# Patient Record
Sex: MALE | Race: OTHER | HISPANIC OR LATINO | URBAN - METROPOLITAN AREA
[De-identification: names, ages, dates, MRNs, and addresses within clinical notes are randomized per-mention and may not be internally consistent; named-entity substitution may affect disease eponyms.]

---

## 2020-01-01 ENCOUNTER — INPATIENT (INPATIENT)
Facility: HOSPITAL | Age: 50
LOS: 27 days | DRG: 207 | End: 2020-05-13
Attending: FAMILY MEDICINE | Admitting: FAMILY MEDICINE
Payer: MEDICAID

## 2020-01-01 ENCOUNTER — EMERGENCY (EMERGENCY)
Facility: HOSPITAL | Age: 50
LOS: 1 days | Discharge: HOME | End: 2020-01-01
Attending: EMERGENCY MEDICINE
Payer: MEDICAID

## 2020-01-01 VITALS
HEART RATE: 106 BPM | TEMPERATURE: 99 F | DIASTOLIC BLOOD PRESSURE: 74 MMHG | SYSTOLIC BLOOD PRESSURE: 132 MMHG | RESPIRATION RATE: 22 BRPM | WEIGHT: 156.09 LBS | OXYGEN SATURATION: 86 % | HEIGHT: 62 IN

## 2020-01-01 VITALS — HEART RATE: 128 BPM | OXYGEN SATURATION: 91 %

## 2020-01-01 VITALS
DIASTOLIC BLOOD PRESSURE: 85 MMHG | TEMPERATURE: 99 F | RESPIRATION RATE: 18 BRPM | WEIGHT: 156.09 LBS | SYSTOLIC BLOOD PRESSURE: 134 MMHG | OXYGEN SATURATION: 96 % | HEART RATE: 107 BPM | HEIGHT: 62 IN

## 2020-01-01 DIAGNOSIS — U07.1 COVID-19: ICD-10-CM

## 2020-01-01 DIAGNOSIS — A41.9 SEPSIS, UNSPECIFIED ORGANISM: ICD-10-CM

## 2020-01-01 DIAGNOSIS — I50.9 HEART FAILURE, UNSPECIFIED: ICD-10-CM

## 2020-01-01 DIAGNOSIS — J18.9 PNEUMONIA, UNSPECIFIED ORGANISM: ICD-10-CM

## 2020-01-01 DIAGNOSIS — Z51.5 ENCOUNTER FOR PALLIATIVE CARE: ICD-10-CM

## 2020-01-01 DIAGNOSIS — Z29.9 ENCOUNTER FOR PROPHYLACTIC MEASURES, UNSPECIFIED: ICD-10-CM

## 2020-01-01 DIAGNOSIS — J96.01 ACUTE RESPIRATORY FAILURE WITH HYPOXIA: ICD-10-CM

## 2020-01-01 DIAGNOSIS — E43 UNSPECIFIED SEVERE PROTEIN-CALORIE MALNUTRITION: ICD-10-CM

## 2020-01-01 DIAGNOSIS — J93.9 PNEUMOTHORAX, UNSPECIFIED: ICD-10-CM

## 2020-01-01 DIAGNOSIS — B97.21 SARS-ASSOCIATED CORONAVIRUS AS THE CAUSE OF DISEASES CLASSIFIED ELSEWHERE: ICD-10-CM

## 2020-01-01 DIAGNOSIS — R53.2 FUNCTIONAL QUADRIPLEGIA: ICD-10-CM

## 2020-01-01 DIAGNOSIS — R74.0 NONSPECIFIC ELEVATION OF LEVELS OF TRANSAMINASE AND LACTIC ACID DEHYDROGENASE [LDH]: ICD-10-CM

## 2020-01-01 LAB
-  AMPICILLIN: SIGNIFICANT CHANGE UP
-  GENTAMICIN SYNERGY: SIGNIFICANT CHANGE UP
-  VANCOMYCIN: SIGNIFICANT CHANGE UP
A1C WITH ESTIMATED AVERAGE GLUCOSE RESULT: 5.6 % — SIGNIFICANT CHANGE UP (ref 4–5.6)
ABO RH CONFIRMATION: SIGNIFICANT CHANGE UP
ALBUMIN SERPL ELPH-MCNC: 1.4 G/DL — LOW (ref 3.5–5)
ALBUMIN SERPL ELPH-MCNC: 1.5 G/DL — LOW (ref 3.5–5)
ALBUMIN SERPL ELPH-MCNC: 1.6 G/DL — LOW (ref 3.5–5)
ALBUMIN SERPL ELPH-MCNC: 1.7 G/DL — LOW (ref 3.5–5)
ALBUMIN SERPL ELPH-MCNC: 1.8 G/DL — LOW (ref 3.5–5)
ALBUMIN SERPL ELPH-MCNC: 1.8 G/DL — LOW (ref 3.5–5)
ALBUMIN SERPL ELPH-MCNC: 2 G/DL — LOW (ref 3.5–5)
ALBUMIN SERPL ELPH-MCNC: 2.1 G/DL — LOW (ref 3.5–5)
ALBUMIN SERPL ELPH-MCNC: 2.2 G/DL — LOW (ref 3.5–5)
ALBUMIN SERPL ELPH-MCNC: 2.3 G/DL — LOW (ref 3.5–5)
ALBUMIN SERPL ELPH-MCNC: 2.3 G/DL — LOW (ref 3.5–5)
ALBUMIN SERPL ELPH-MCNC: 2.4 G/DL — LOW (ref 3.5–5)
ALBUMIN SERPL ELPH-MCNC: 2.4 G/DL — LOW (ref 3.5–5)
ALBUMIN SERPL ELPH-MCNC: 2.5 G/DL — LOW (ref 3.5–5)
ALBUMIN SERPL ELPH-MCNC: 2.6 G/DL — LOW (ref 3.5–5)
ALBUMIN SERPL ELPH-MCNC: 2.6 G/DL — LOW (ref 3.5–5)
ALP SERPL-CCNC: 147 U/L — HIGH (ref 40–120)
ALP SERPL-CCNC: 154 U/L — HIGH (ref 40–120)
ALP SERPL-CCNC: 157 U/L — HIGH (ref 40–120)
ALP SERPL-CCNC: 162 U/L — HIGH (ref 40–120)
ALP SERPL-CCNC: 167 U/L — HIGH (ref 40–120)
ALP SERPL-CCNC: 168 U/L — HIGH (ref 40–120)
ALP SERPL-CCNC: 171 U/L — HIGH (ref 40–120)
ALP SERPL-CCNC: 213 U/L — HIGH (ref 40–120)
ALP SERPL-CCNC: 216 U/L — HIGH (ref 40–120)
ALP SERPL-CCNC: 217 U/L — HIGH (ref 40–120)
ALP SERPL-CCNC: 219 U/L — HIGH (ref 40–120)
ALP SERPL-CCNC: 226 U/L — HIGH (ref 40–120)
ALP SERPL-CCNC: 229 U/L — HIGH (ref 40–120)
ALP SERPL-CCNC: 235 U/L — HIGH (ref 40–120)
ALP SERPL-CCNC: 236 U/L — HIGH (ref 40–120)
ALP SERPL-CCNC: 241 U/L — HIGH (ref 40–120)
ALP SERPL-CCNC: 249 U/L — HIGH (ref 40–120)
ALP SERPL-CCNC: 258 U/L — HIGH (ref 40–120)
ALP SERPL-CCNC: 259 U/L — HIGH (ref 40–120)
ALP SERPL-CCNC: 260 U/L — HIGH (ref 40–120)
ALP SERPL-CCNC: 268 U/L — HIGH (ref 40–120)
ALP SERPL-CCNC: 270 U/L — HIGH (ref 40–120)
ALP SERPL-CCNC: 272 U/L — HIGH (ref 40–120)
ALP SERPL-CCNC: 291 U/L — HIGH (ref 40–120)
ALP SERPL-CCNC: 297 U/L — HIGH (ref 40–120)
ALP SERPL-CCNC: 297 U/L — HIGH (ref 40–120)
ALP SERPL-CCNC: 340 U/L — HIGH (ref 40–120)
ALT FLD-CCNC: 106 U/L DA — HIGH (ref 10–60)
ALT FLD-CCNC: 137 U/L DA — HIGH (ref 10–60)
ALT FLD-CCNC: 145 U/L DA — HIGH (ref 10–60)
ALT FLD-CCNC: 149 U/L DA — HIGH (ref 10–60)
ALT FLD-CCNC: 15 U/L DA — SIGNIFICANT CHANGE UP (ref 10–60)
ALT FLD-CCNC: 163 U/L DA — HIGH (ref 10–60)
ALT FLD-CCNC: 164 U/L DA — HIGH (ref 10–60)
ALT FLD-CCNC: 19 U/L DA — SIGNIFICANT CHANGE UP (ref 10–60)
ALT FLD-CCNC: 19 U/L DA — SIGNIFICANT CHANGE UP (ref 10–60)
ALT FLD-CCNC: 194 U/L DA — HIGH (ref 10–60)
ALT FLD-CCNC: 197 U/L DA — HIGH (ref 10–60)
ALT FLD-CCNC: 201 U/L DA — HIGH (ref 10–60)
ALT FLD-CCNC: 203 U/L DA — HIGH (ref 10–60)
ALT FLD-CCNC: 22 U/L DA — SIGNIFICANT CHANGE UP (ref 10–60)
ALT FLD-CCNC: 24 U/L DA — SIGNIFICANT CHANGE UP (ref 10–60)
ALT FLD-CCNC: 30 U/L DA — SIGNIFICANT CHANGE UP (ref 10–60)
ALT FLD-CCNC: 33 U/L DA — SIGNIFICANT CHANGE UP (ref 10–60)
ALT FLD-CCNC: 37 U/L DA — SIGNIFICANT CHANGE UP (ref 10–60)
ALT FLD-CCNC: 41 U/L DA — SIGNIFICANT CHANGE UP (ref 10–60)
ALT FLD-CCNC: 57 U/L DA — SIGNIFICANT CHANGE UP (ref 10–60)
ALT FLD-CCNC: 57 U/L DA — SIGNIFICANT CHANGE UP (ref 10–60)
ALT FLD-CCNC: 58 U/L DA — SIGNIFICANT CHANGE UP (ref 10–60)
ALT FLD-CCNC: 66 U/L DA — HIGH (ref 10–60)
ALT FLD-CCNC: 70 U/L DA — HIGH (ref 10–60)
ALT FLD-CCNC: 72 U/L DA — HIGH (ref 10–60)
ALT FLD-CCNC: 75 U/L DA — HIGH (ref 10–60)
ALT FLD-CCNC: 76 U/L DA — HIGH (ref 10–60)
ALT FLD-CCNC: 76 U/L DA — HIGH (ref 10–60)
ALT FLD-CCNC: 95 U/L DA — HIGH (ref 10–60)
ANION GAP SERPL CALC-SCNC: 10 MMOL/L — SIGNIFICANT CHANGE UP (ref 5–17)
ANION GAP SERPL CALC-SCNC: 11 MMOL/L — SIGNIFICANT CHANGE UP (ref 5–17)
ANION GAP SERPL CALC-SCNC: 11 MMOL/L — SIGNIFICANT CHANGE UP (ref 5–17)
ANION GAP SERPL CALC-SCNC: 2 MMOL/L — LOW (ref 5–17)
ANION GAP SERPL CALC-SCNC: 3 MMOL/L — LOW (ref 5–17)
ANION GAP SERPL CALC-SCNC: 4 MMOL/L — LOW (ref 5–17)
ANION GAP SERPL CALC-SCNC: 5 MMOL/L — SIGNIFICANT CHANGE UP (ref 5–17)
ANION GAP SERPL CALC-SCNC: 5 MMOL/L — SIGNIFICANT CHANGE UP (ref 5–17)
ANION GAP SERPL CALC-SCNC: 6 MMOL/L — SIGNIFICANT CHANGE UP (ref 5–17)
ANION GAP SERPL CALC-SCNC: 7 MMOL/L — SIGNIFICANT CHANGE UP (ref 5–17)
ANION GAP SERPL CALC-SCNC: 8 MMOL/L — SIGNIFICANT CHANGE UP (ref 5–17)
ANION GAP SERPL CALC-SCNC: 9 MMOL/L — SIGNIFICANT CHANGE UP (ref 5–17)
ANISOCYTOSIS BLD QL: SLIGHT — SIGNIFICANT CHANGE UP
ANISOCYTOSIS BLD QL: SLIGHT — SIGNIFICANT CHANGE UP
APPEARANCE UR: ABNORMAL
APTT BLD: 29.6 SEC — SIGNIFICANT CHANGE UP (ref 27.5–36.3)
APTT BLD: 31.8 SEC — SIGNIFICANT CHANGE UP (ref 27.5–36.3)
APTT BLD: 32.9 SEC — SIGNIFICANT CHANGE UP (ref 27.5–36.3)
APTT BLD: 34 SEC — SIGNIFICANT CHANGE UP (ref 27.5–36.3)
APTT BLD: 37.4 SEC — HIGH (ref 27.5–36.3)
APTT BLD: 39.7 SEC — HIGH (ref 27.5–36.3)
AST SERPL-CCNC: 116 U/L — HIGH (ref 10–40)
AST SERPL-CCNC: 124 U/L — HIGH (ref 10–40)
AST SERPL-CCNC: 135 U/L — HIGH (ref 10–40)
AST SERPL-CCNC: 165 U/L — HIGH (ref 10–40)
AST SERPL-CCNC: 23 U/L — SIGNIFICANT CHANGE UP (ref 10–40)
AST SERPL-CCNC: 24 U/L — SIGNIFICANT CHANGE UP (ref 10–40)
AST SERPL-CCNC: 25 U/L — SIGNIFICANT CHANGE UP (ref 10–40)
AST SERPL-CCNC: 25 U/L — SIGNIFICANT CHANGE UP (ref 10–40)
AST SERPL-CCNC: 27 U/L — SIGNIFICANT CHANGE UP (ref 10–40)
AST SERPL-CCNC: 29 U/L — SIGNIFICANT CHANGE UP (ref 10–40)
AST SERPL-CCNC: 30 U/L — SIGNIFICANT CHANGE UP (ref 10–40)
AST SERPL-CCNC: 31 U/L — SIGNIFICANT CHANGE UP (ref 10–40)
AST SERPL-CCNC: 31 U/L — SIGNIFICANT CHANGE UP (ref 10–40)
AST SERPL-CCNC: 35 U/L — SIGNIFICANT CHANGE UP (ref 10–40)
AST SERPL-CCNC: 38 U/L — SIGNIFICANT CHANGE UP (ref 10–40)
AST SERPL-CCNC: 44 U/L — HIGH (ref 10–40)
AST SERPL-CCNC: 45 U/L — HIGH (ref 10–40)
AST SERPL-CCNC: 48 U/L — HIGH (ref 10–40)
AST SERPL-CCNC: 49 U/L — HIGH (ref 10–40)
AST SERPL-CCNC: 53 U/L — HIGH (ref 10–40)
AST SERPL-CCNC: 57 U/L — HIGH (ref 10–40)
AST SERPL-CCNC: 58 U/L — HIGH (ref 10–40)
AST SERPL-CCNC: 61 U/L — HIGH (ref 10–40)
AST SERPL-CCNC: 63 U/L — HIGH (ref 10–40)
AST SERPL-CCNC: 65 U/L — HIGH (ref 10–40)
AST SERPL-CCNC: 67 U/L — HIGH (ref 10–40)
AST SERPL-CCNC: 73 U/L — HIGH (ref 10–40)
AST SERPL-CCNC: 73 U/L — HIGH (ref 10–40)
AST SERPL-CCNC: 95 U/L — HIGH (ref 10–40)
BACTERIA # UR AUTO: ABNORMAL /HPF
BASE EXCESS BLDA CALC-SCNC: -1.5 MMOL/L — SIGNIFICANT CHANGE UP (ref -2–2)
BASE EXCESS BLDA CALC-SCNC: -1.8 MMOL/L — SIGNIFICANT CHANGE UP (ref -2–2)
BASE EXCESS BLDA CALC-SCNC: -1.8 MMOL/L — SIGNIFICANT CHANGE UP (ref -2–2)
BASE EXCESS BLDA CALC-SCNC: -3.3 MMOL/L — LOW (ref -2–2)
BASE EXCESS BLDA CALC-SCNC: 0.3 MMOL/L — SIGNIFICANT CHANGE UP (ref -2–2)
BASE EXCESS BLDA CALC-SCNC: 0.5 MMOL/L — SIGNIFICANT CHANGE UP (ref -2–2)
BASE EXCESS BLDA CALC-SCNC: 0.8 MMOL/L — SIGNIFICANT CHANGE UP (ref -2–2)
BASE EXCESS BLDA CALC-SCNC: 0.9 MMOL/L — SIGNIFICANT CHANGE UP (ref -2–2)
BASE EXCESS BLDA CALC-SCNC: 1 MMOL/L — SIGNIFICANT CHANGE UP (ref -2–2)
BASE EXCESS BLDA CALC-SCNC: 10.5 MMOL/L — HIGH (ref -2–2)
BASE EXCESS BLDA CALC-SCNC: 11.7 MMOL/L — HIGH (ref -2–2)
BASE EXCESS BLDA CALC-SCNC: 12.7 MMOL/L — HIGH (ref -2–2)
BASE EXCESS BLDA CALC-SCNC: 13.1 MMOL/L — HIGH (ref -2–2)
BASE EXCESS BLDA CALC-SCNC: 13.4 MMOL/L — HIGH (ref -2–2)
BASE EXCESS BLDA CALC-SCNC: 2 MMOL/L — SIGNIFICANT CHANGE UP (ref -2–2)
BASE EXCESS BLDA CALC-SCNC: 2.6 MMOL/L — HIGH (ref -2–2)
BASE EXCESS BLDA CALC-SCNC: 2.7 MMOL/L — HIGH (ref -2–2)
BASE EXCESS BLDA CALC-SCNC: 3 MMOL/L — HIGH (ref -2–2)
BASE EXCESS BLDA CALC-SCNC: 3.2 MMOL/L — HIGH (ref -2–2)
BASE EXCESS BLDA CALC-SCNC: 3.5 MMOL/L — HIGH (ref -2–2)
BASE EXCESS BLDA CALC-SCNC: 3.5 MMOL/L — HIGH (ref -2–2)
BASE EXCESS BLDA CALC-SCNC: 3.7 MMOL/L — HIGH (ref -2–2)
BASE EXCESS BLDA CALC-SCNC: 3.7 MMOL/L — HIGH (ref -2–2)
BASE EXCESS BLDA CALC-SCNC: 3.8 MMOL/L — HIGH (ref -2–2)
BASE EXCESS BLDA CALC-SCNC: 4.5 MMOL/L — HIGH (ref -2–2)
BASE EXCESS BLDA CALC-SCNC: 5.1 MMOL/L — HIGH (ref -2–2)
BASE EXCESS BLDA CALC-SCNC: 5.5 MMOL/L — HIGH (ref -2–2)
BASE EXCESS BLDA CALC-SCNC: 5.6 MMOL/L — HIGH (ref -2–2)
BASE EXCESS BLDA CALC-SCNC: 5.8 MMOL/L — HIGH (ref -2–2)
BASE EXCESS BLDA CALC-SCNC: 6.1 MMOL/L — HIGH (ref -2–2)
BASE EXCESS BLDA CALC-SCNC: 6.1 MMOL/L — HIGH (ref -2–2)
BASE EXCESS BLDA CALC-SCNC: 6.7 MMOL/L — SIGNIFICANT CHANGE UP (ref -2–2)
BASE EXCESS BLDA CALC-SCNC: 6.8 MMOL/L — HIGH (ref -2–2)
BASE EXCESS BLDA CALC-SCNC: 7 MMOL/L — HIGH (ref -2–2)
BASE EXCESS BLDA CALC-SCNC: 7 MMOL/L — HIGH (ref -2–2)
BASE EXCESS BLDA CALC-SCNC: 7.5 MMOL/L — HIGH (ref -2–2)
BASE EXCESS BLDA CALC-SCNC: 7.7 MMOL/L — HIGH (ref -2–2)
BASE EXCESS BLDA CALC-SCNC: 8.3 MMOL/L — HIGH (ref -2–2)
BASE EXCESS BLDA CALC-SCNC: 8.5 MMOL/L — HIGH (ref -2–2)
BASE EXCESS BLDA CALC-SCNC: 8.5 MMOL/L — HIGH (ref -2–2)
BASE EXCESS BLDA CALC-SCNC: 8.6 MMOL/L — HIGH (ref -2–2)
BASOPHILS # BLD AUTO: 0 K/UL — SIGNIFICANT CHANGE UP (ref 0–0.2)
BASOPHILS # BLD AUTO: 0.01 K/UL — SIGNIFICANT CHANGE UP (ref 0–0.2)
BASOPHILS # BLD AUTO: 0.04 K/UL — SIGNIFICANT CHANGE UP (ref 0–0.2)
BASOPHILS # BLD AUTO: 0.04 K/UL — SIGNIFICANT CHANGE UP (ref 0–0.2)
BASOPHILS # BLD AUTO: 0.05 K/UL — SIGNIFICANT CHANGE UP (ref 0–0.2)
BASOPHILS # BLD AUTO: 0.05 K/UL — SIGNIFICANT CHANGE UP (ref 0–0.2)
BASOPHILS # BLD AUTO: 0.06 K/UL — SIGNIFICANT CHANGE UP (ref 0–0.2)
BASOPHILS # BLD AUTO: 0.07 K/UL — SIGNIFICANT CHANGE UP (ref 0–0.2)
BASOPHILS # BLD AUTO: 0.07 K/UL — SIGNIFICANT CHANGE UP (ref 0–0.2)
BASOPHILS # BLD AUTO: 0.08 K/UL — SIGNIFICANT CHANGE UP (ref 0–0.2)
BASOPHILS # BLD AUTO: 0.08 K/UL — SIGNIFICANT CHANGE UP (ref 0–0.2)
BASOPHILS # BLD AUTO: 0.09 K/UL — SIGNIFICANT CHANGE UP (ref 0–0.2)
BASOPHILS # BLD AUTO: 0.1 K/UL — SIGNIFICANT CHANGE UP (ref 0–0.2)
BASOPHILS # BLD AUTO: 0.11 K/UL — SIGNIFICANT CHANGE UP (ref 0–0.2)
BASOPHILS # BLD AUTO: 0.21 K/UL — HIGH (ref 0–0.2)
BASOPHILS NFR BLD AUTO: 0 % — SIGNIFICANT CHANGE UP (ref 0–2)
BASOPHILS NFR BLD AUTO: 0.1 % — SIGNIFICANT CHANGE UP (ref 0–2)
BASOPHILS NFR BLD AUTO: 0.2 % — SIGNIFICANT CHANGE UP (ref 0–2)
BASOPHILS NFR BLD AUTO: 0.3 % — SIGNIFICANT CHANGE UP (ref 0–2)
BASOPHILS NFR BLD AUTO: 0.4 % — SIGNIFICANT CHANGE UP (ref 0–2)
BASOPHILS NFR BLD AUTO: 0.5 % — SIGNIFICANT CHANGE UP (ref 0–2)
BASOPHILS NFR BLD AUTO: 1 % — SIGNIFICANT CHANGE UP (ref 0–2)
BILIRUB DIRECT SERPL-MCNC: 0.5 MG/DL — HIGH (ref 0–0.2)
BILIRUB INDIRECT FLD-MCNC: 0.6 MG/DL — SIGNIFICANT CHANGE UP (ref 0.2–1)
BILIRUB SERPL-MCNC: 0.6 MG/DL — SIGNIFICANT CHANGE UP (ref 0.2–1.2)
BILIRUB SERPL-MCNC: 0.7 MG/DL — SIGNIFICANT CHANGE UP (ref 0.2–1.2)
BILIRUB SERPL-MCNC: 0.7 MG/DL — SIGNIFICANT CHANGE UP (ref 0.2–1.2)
BILIRUB SERPL-MCNC: 0.8 MG/DL — SIGNIFICANT CHANGE UP (ref 0.2–1.2)
BILIRUB SERPL-MCNC: 0.9 MG/DL — SIGNIFICANT CHANGE UP (ref 0.2–1.2)
BILIRUB SERPL-MCNC: 0.9 MG/DL — SIGNIFICANT CHANGE UP (ref 0.2–1.2)
BILIRUB SERPL-MCNC: 1 MG/DL — SIGNIFICANT CHANGE UP (ref 0.2–1.2)
BILIRUB SERPL-MCNC: 1.1 MG/DL — SIGNIFICANT CHANGE UP (ref 0.2–1.2)
BILIRUB SERPL-MCNC: 1.2 MG/DL — SIGNIFICANT CHANGE UP (ref 0.2–1.2)
BILIRUB SERPL-MCNC: 1.3 MG/DL — HIGH (ref 0.2–1.2)
BILIRUB SERPL-MCNC: 1.4 MG/DL — HIGH (ref 0.2–1.2)
BILIRUB SERPL-MCNC: 1.5 MG/DL — HIGH (ref 0.2–1.2)
BILIRUB SERPL-MCNC: 1.5 MG/DL — HIGH (ref 0.2–1.2)
BILIRUB SERPL-MCNC: 2.2 MG/DL — HIGH (ref 0.2–1.2)
BILIRUB UR-MCNC: NEGATIVE — SIGNIFICANT CHANGE UP
BLD GP AB SCN SERPL QL: SIGNIFICANT CHANGE UP
BLOOD GAS COMMENTS ARTERIAL: SIGNIFICANT CHANGE UP
BUN SERPL-MCNC: 10 MG/DL — SIGNIFICANT CHANGE UP (ref 7–18)
BUN SERPL-MCNC: 11 MG/DL — SIGNIFICANT CHANGE UP (ref 7–18)
BUN SERPL-MCNC: 12 MG/DL — SIGNIFICANT CHANGE UP (ref 7–18)
BUN SERPL-MCNC: 12 MG/DL — SIGNIFICANT CHANGE UP (ref 7–18)
BUN SERPL-MCNC: 13 MG/DL — SIGNIFICANT CHANGE UP (ref 7–18)
BUN SERPL-MCNC: 14 MG/DL — SIGNIFICANT CHANGE UP (ref 7–18)
BUN SERPL-MCNC: 15 MG/DL — SIGNIFICANT CHANGE UP (ref 7–18)
BUN SERPL-MCNC: 16 MG/DL — SIGNIFICANT CHANGE UP (ref 7–18)
BUN SERPL-MCNC: 17 MG/DL — SIGNIFICANT CHANGE UP (ref 7–18)
BUN SERPL-MCNC: 17 MG/DL — SIGNIFICANT CHANGE UP (ref 7–18)
BUN SERPL-MCNC: 19 MG/DL — HIGH (ref 7–18)
BUN SERPL-MCNC: 20 MG/DL — HIGH (ref 7–18)
BUN SERPL-MCNC: 21 MG/DL — HIGH (ref 7–18)
BUN SERPL-MCNC: 23 MG/DL — HIGH (ref 7–18)
BUN SERPL-MCNC: 23 MG/DL — HIGH (ref 7–18)
BUN SERPL-MCNC: 24 MG/DL — HIGH (ref 7–18)
BUN SERPL-MCNC: 26 MG/DL — HIGH (ref 7–18)
BUN SERPL-MCNC: 28 MG/DL — HIGH (ref 7–18)
BUN SERPL-MCNC: 33 MG/DL — HIGH (ref 7–18)
BUN SERPL-MCNC: 7 MG/DL — SIGNIFICANT CHANGE UP (ref 7–18)
BUN SERPL-MCNC: 9 MG/DL — SIGNIFICANT CHANGE UP (ref 7–18)
BUN SERPL-MCNC: 9 MG/DL — SIGNIFICANT CHANGE UP (ref 7–18)
CALCIUM SERPL-MCNC: 7 MG/DL — LOW (ref 8.4–10.5)
CALCIUM SERPL-MCNC: 7.1 MG/DL — LOW (ref 8.4–10.5)
CALCIUM SERPL-MCNC: 7.3 MG/DL — LOW (ref 8.4–10.5)
CALCIUM SERPL-MCNC: 7.4 MG/DL — LOW (ref 8.4–10.5)
CALCIUM SERPL-MCNC: 7.5 MG/DL — LOW (ref 8.4–10.5)
CALCIUM SERPL-MCNC: 7.5 MG/DL — LOW (ref 8.4–10.5)
CALCIUM SERPL-MCNC: 7.6 MG/DL — LOW (ref 8.4–10.5)
CALCIUM SERPL-MCNC: 7.6 MG/DL — LOW (ref 8.4–10.5)
CALCIUM SERPL-MCNC: 7.7 MG/DL — LOW (ref 8.4–10.5)
CALCIUM SERPL-MCNC: 7.8 MG/DL — LOW (ref 8.4–10.5)
CALCIUM SERPL-MCNC: 7.8 MG/DL — LOW (ref 8.4–10.5)
CALCIUM SERPL-MCNC: 7.9 MG/DL — LOW (ref 8.4–10.5)
CALCIUM SERPL-MCNC: 8.1 MG/DL — LOW (ref 8.4–10.5)
CALCIUM SERPL-MCNC: 8.2 MG/DL — LOW (ref 8.4–10.5)
CALCIUM SERPL-MCNC: 8.3 MG/DL — LOW (ref 8.4–10.5)
CALCIUM SERPL-MCNC: 8.5 MG/DL — SIGNIFICANT CHANGE UP (ref 8.4–10.5)
CALCIUM SERPL-MCNC: 8.5 MG/DL — SIGNIFICANT CHANGE UP (ref 8.4–10.5)
CALCIUM SERPL-MCNC: 8.6 MG/DL — SIGNIFICANT CHANGE UP (ref 8.4–10.5)
CALCIUM SERPL-MCNC: 9 MG/DL — SIGNIFICANT CHANGE UP (ref 8.4–10.5)
CHLORIDE SERPL-SCNC: 100 MMOL/L — SIGNIFICANT CHANGE UP (ref 96–108)
CHLORIDE SERPL-SCNC: 101 MMOL/L — SIGNIFICANT CHANGE UP (ref 96–108)
CHLORIDE SERPL-SCNC: 101 MMOL/L — SIGNIFICANT CHANGE UP (ref 96–108)
CHLORIDE SERPL-SCNC: 102 MMOL/L — SIGNIFICANT CHANGE UP (ref 96–108)
CHLORIDE SERPL-SCNC: 103 MMOL/L — SIGNIFICANT CHANGE UP (ref 96–108)
CHLORIDE SERPL-SCNC: 104 MMOL/L — SIGNIFICANT CHANGE UP (ref 96–108)
CHLORIDE SERPL-SCNC: 105 MMOL/L — SIGNIFICANT CHANGE UP (ref 96–108)
CHLORIDE SERPL-SCNC: 107 MMOL/L — SIGNIFICANT CHANGE UP (ref 96–108)
CHLORIDE SERPL-SCNC: 107 MMOL/L — SIGNIFICANT CHANGE UP (ref 96–108)
CHLORIDE SERPL-SCNC: 108 MMOL/L — SIGNIFICANT CHANGE UP (ref 96–108)
CHLORIDE SERPL-SCNC: 109 MMOL/L — HIGH (ref 96–108)
CHLORIDE SERPL-SCNC: 111 MMOL/L — HIGH (ref 96–108)
CHLORIDE SERPL-SCNC: 112 MMOL/L — HIGH (ref 96–108)
CHLORIDE SERPL-SCNC: 114 MMOL/L — HIGH (ref 96–108)
CHLORIDE SERPL-SCNC: 114 MMOL/L — HIGH (ref 96–108)
CHLORIDE SERPL-SCNC: 115 MMOL/L — HIGH (ref 96–108)
CHLORIDE SERPL-SCNC: 96 MMOL/L — SIGNIFICANT CHANGE UP (ref 96–108)
CHLORIDE SERPL-SCNC: 96 MMOL/L — SIGNIFICANT CHANGE UP (ref 96–108)
CHLORIDE SERPL-SCNC: 97 MMOL/L — SIGNIFICANT CHANGE UP (ref 96–108)
CHLORIDE SERPL-SCNC: 98 MMOL/L — SIGNIFICANT CHANGE UP (ref 96–108)
CHLORIDE SERPL-SCNC: 99 MMOL/L — SIGNIFICANT CHANGE UP (ref 96–108)
CHOLEST SERPL-MCNC: 128 MG/DL — SIGNIFICANT CHANGE UP (ref 10–199)
CK MB BLD-MCNC: 0.8 % — SIGNIFICANT CHANGE UP (ref 0–3.5)
CK MB CFR SERPL CALC: 2.7 NG/ML — SIGNIFICANT CHANGE UP (ref 0–3.6)
CK SERPL-CCNC: 330 U/L — HIGH (ref 35–232)
CO2 SERPL-SCNC: 25 MMOL/L — SIGNIFICANT CHANGE UP (ref 22–31)
CO2 SERPL-SCNC: 26 MMOL/L — SIGNIFICANT CHANGE UP (ref 22–31)
CO2 SERPL-SCNC: 27 MMOL/L — SIGNIFICANT CHANGE UP (ref 22–31)
CO2 SERPL-SCNC: 29 MMOL/L — SIGNIFICANT CHANGE UP (ref 22–31)
CO2 SERPL-SCNC: 30 MMOL/L — SIGNIFICANT CHANGE UP (ref 22–31)
CO2 SERPL-SCNC: 31 MMOL/L — SIGNIFICANT CHANGE UP (ref 22–31)
CO2 SERPL-SCNC: 32 MMOL/L — HIGH (ref 22–31)
CO2 SERPL-SCNC: 33 MMOL/L — HIGH (ref 22–31)
CO2 SERPL-SCNC: 34 MMOL/L — HIGH (ref 22–31)
CO2 SERPL-SCNC: 35 MMOL/L — HIGH (ref 22–31)
CO2 SERPL-SCNC: 36 MMOL/L — HIGH (ref 22–31)
CO2 SERPL-SCNC: 38 MMOL/L — HIGH (ref 22–31)
CO2 SERPL-SCNC: 39 MMOL/L — HIGH (ref 22–31)
CO2 SERPL-SCNC: 39 MMOL/L — HIGH (ref 22–31)
CO2 SERPL-SCNC: 41 MMOL/L — HIGH (ref 22–31)
COLOR SPEC: YELLOW — SIGNIFICANT CHANGE UP
CREAT SERPL-MCNC: 0.3 MG/DL — LOW (ref 0.5–1.3)
CREAT SERPL-MCNC: 0.38 MG/DL — LOW (ref 0.5–1.3)
CREAT SERPL-MCNC: 0.38 MG/DL — LOW (ref 0.5–1.3)
CREAT SERPL-MCNC: 0.4 MG/DL — LOW (ref 0.5–1.3)
CREAT SERPL-MCNC: 0.46 MG/DL — LOW (ref 0.5–1.3)
CREAT SERPL-MCNC: 0.48 MG/DL — LOW (ref 0.5–1.3)
CREAT SERPL-MCNC: 0.52 MG/DL — SIGNIFICANT CHANGE UP (ref 0.5–1.3)
CREAT SERPL-MCNC: 0.54 MG/DL — SIGNIFICANT CHANGE UP (ref 0.5–1.3)
CREAT SERPL-MCNC: 0.55 MG/DL — SIGNIFICANT CHANGE UP (ref 0.5–1.3)
CREAT SERPL-MCNC: 0.56 MG/DL — SIGNIFICANT CHANGE UP (ref 0.5–1.3)
CREAT SERPL-MCNC: 0.57 MG/DL — SIGNIFICANT CHANGE UP (ref 0.5–1.3)
CREAT SERPL-MCNC: 0.58 MG/DL — SIGNIFICANT CHANGE UP (ref 0.5–1.3)
CREAT SERPL-MCNC: 0.59 MG/DL — SIGNIFICANT CHANGE UP (ref 0.5–1.3)
CREAT SERPL-MCNC: 0.6 MG/DL — SIGNIFICANT CHANGE UP (ref 0.5–1.3)
CREAT SERPL-MCNC: 0.61 MG/DL — SIGNIFICANT CHANGE UP (ref 0.5–1.3)
CREAT SERPL-MCNC: 0.63 MG/DL — SIGNIFICANT CHANGE UP (ref 0.5–1.3)
CREAT SERPL-MCNC: 0.63 MG/DL — SIGNIFICANT CHANGE UP (ref 0.5–1.3)
CREAT SERPL-MCNC: 0.64 MG/DL — SIGNIFICANT CHANGE UP (ref 0.5–1.3)
CREAT SERPL-MCNC: 0.65 MG/DL — SIGNIFICANT CHANGE UP (ref 0.5–1.3)
CREAT SERPL-MCNC: 0.66 MG/DL — SIGNIFICANT CHANGE UP (ref 0.5–1.3)
CREAT SERPL-MCNC: 0.67 MG/DL — SIGNIFICANT CHANGE UP (ref 0.5–1.3)
CREAT SERPL-MCNC: 0.68 MG/DL — SIGNIFICANT CHANGE UP (ref 0.5–1.3)
CREAT SERPL-MCNC: 0.68 MG/DL — SIGNIFICANT CHANGE UP (ref 0.5–1.3)
CREAT SERPL-MCNC: 0.69 MG/DL — SIGNIFICANT CHANGE UP (ref 0.5–1.3)
CREAT SERPL-MCNC: 0.72 MG/DL — SIGNIFICANT CHANGE UP (ref 0.5–1.3)
CREAT SERPL-MCNC: 0.72 MG/DL — SIGNIFICANT CHANGE UP (ref 0.5–1.3)
CREAT SERPL-MCNC: 0.74 MG/DL — SIGNIFICANT CHANGE UP (ref 0.5–1.3)
CREAT SERPL-MCNC: 0.83 MG/DL — SIGNIFICANT CHANGE UP (ref 0.5–1.3)
CREAT SERPL-MCNC: 0.89 MG/DL — SIGNIFICANT CHANGE UP (ref 0.5–1.3)
CREAT SERPL-MCNC: 0.96 MG/DL — SIGNIFICANT CHANGE UP (ref 0.5–1.3)
CREAT SERPL-MCNC: 1.03 MG/DL — SIGNIFICANT CHANGE UP (ref 0.5–1.3)
CREAT SERPL-MCNC: 1.04 MG/DL — SIGNIFICANT CHANGE UP (ref 0.5–1.3)
CRP SERPL-MCNC: 0.51 MG/DL — HIGH (ref 0–0.4)
CRP SERPL-MCNC: 0.84 MG/DL — HIGH (ref 0–0.4)
CRP SERPL-MCNC: 0.91 MG/DL — HIGH (ref 0–0.4)
CRP SERPL-MCNC: 1.19 MG/DL — HIGH (ref 0–0.4)
CRP SERPL-MCNC: 1.26 MG/DL — HIGH (ref 0–0.4)
CRP SERPL-MCNC: 1.52 MG/DL — HIGH (ref 0–0.4)
CRP SERPL-MCNC: 10.88 MG/DL — HIGH (ref 0–0.4)
CRP SERPL-MCNC: 11.27 MG/DL — HIGH (ref 0–0.4)
CRP SERPL-MCNC: 11.35 MG/DL — HIGH (ref 0–0.4)
CRP SERPL-MCNC: 15.1 MG/DL — HIGH (ref 0–0.4)
CRP SERPL-MCNC: 15.65 MG/DL — HIGH (ref 0–0.4)
CRP SERPL-MCNC: 15.71 MG/DL — HIGH (ref 0–0.4)
CRP SERPL-MCNC: 16.74 MG/DL — HIGH (ref 0–0.4)
CRP SERPL-MCNC: 18.18 MG/DL — HIGH (ref 0–0.4)
CRP SERPL-MCNC: 27.98 MG/DL — HIGH (ref 0–0.4)
CRP SERPL-MCNC: 3.2 MG/DL — HIGH (ref 0–0.4)
CRP SERPL-MCNC: 3.47 MG/DL — HIGH (ref 0–0.4)
CRP SERPL-MCNC: 3.6 MG/DL — HIGH (ref 0–0.4)
CRP SERPL-MCNC: 5.49 MG/DL — HIGH (ref 0–0.4)
CRP SERPL-MCNC: 6.08 MG/DL — HIGH (ref 0–0.4)
CRP SERPL-MCNC: 8.16 MG/DL — HIGH (ref 0–0.4)
CULTURE RESULTS: SIGNIFICANT CHANGE UP
D DIMER BLD IA.RAPID-MCNC: 1144 NG/ML DDU — HIGH
D DIMER BLD IA.RAPID-MCNC: 1173 NG/ML DDU — HIGH
D DIMER BLD IA.RAPID-MCNC: 1302 NG/ML DDU — HIGH
D DIMER BLD IA.RAPID-MCNC: 1791 NG/ML DDU — HIGH
D DIMER BLD IA.RAPID-MCNC: 1797 NG/ML DDU — HIGH
D DIMER BLD IA.RAPID-MCNC: 217 NG/ML DDU — SIGNIFICANT CHANGE UP
D DIMER BLD IA.RAPID-MCNC: 247 NG/ML DDU — HIGH
D DIMER BLD IA.RAPID-MCNC: 685 NG/ML DDU — HIGH
DIFF PNL FLD: ABNORMAL
ENTEROCOC DNA BLD POS QL NAA+NON-PROBE: SIGNIFICANT CHANGE UP
EOSINOPHIL # BLD AUTO: 0 K/UL — SIGNIFICANT CHANGE UP (ref 0–0.5)
EOSINOPHIL # BLD AUTO: 0.01 K/UL — SIGNIFICANT CHANGE UP (ref 0–0.5)
EOSINOPHIL # BLD AUTO: 0.04 K/UL — SIGNIFICANT CHANGE UP (ref 0–0.5)
EOSINOPHIL # BLD AUTO: 0.08 K/UL — SIGNIFICANT CHANGE UP (ref 0–0.5)
EOSINOPHIL # BLD AUTO: 0.15 K/UL — SIGNIFICANT CHANGE UP (ref 0–0.5)
EOSINOPHIL # BLD AUTO: 0.18 K/UL — SIGNIFICANT CHANGE UP (ref 0–0.5)
EOSINOPHIL # BLD AUTO: 0.19 K/UL — SIGNIFICANT CHANGE UP (ref 0–0.5)
EOSINOPHIL # BLD AUTO: 0.21 K/UL — SIGNIFICANT CHANGE UP (ref 0–0.5)
EOSINOPHIL # BLD AUTO: 0.23 K/UL — SIGNIFICANT CHANGE UP (ref 0–0.5)
EOSINOPHIL # BLD AUTO: 0.23 K/UL — SIGNIFICANT CHANGE UP (ref 0–0.5)
EOSINOPHIL # BLD AUTO: 0.24 K/UL — SIGNIFICANT CHANGE UP (ref 0–0.5)
EOSINOPHIL # BLD AUTO: 0.24 K/UL — SIGNIFICANT CHANGE UP (ref 0–0.5)
EOSINOPHIL # BLD AUTO: 0.3 K/UL — SIGNIFICANT CHANGE UP (ref 0–0.5)
EOSINOPHIL # BLD AUTO: 0.31 K/UL — SIGNIFICANT CHANGE UP (ref 0–0.5)
EOSINOPHIL # BLD AUTO: 0.32 K/UL — SIGNIFICANT CHANGE UP (ref 0–0.5)
EOSINOPHIL # BLD AUTO: 0.86 K/UL — HIGH (ref 0–0.5)
EOSINOPHIL # BLD AUTO: 1.33 K/UL — HIGH (ref 0–0.5)
EOSINOPHIL # BLD AUTO: 1.39 K/UL — HIGH (ref 0–0.5)
EOSINOPHIL # BLD AUTO: 1.76 K/UL — HIGH (ref 0–0.5)
EOSINOPHIL # BLD AUTO: 2.31 K/UL — HIGH (ref 0–0.5)
EOSINOPHIL NFR BLD AUTO: 0 % — SIGNIFICANT CHANGE UP (ref 0–6)
EOSINOPHIL NFR BLD AUTO: 0.1 % — SIGNIFICANT CHANGE UP (ref 0–6)
EOSINOPHIL NFR BLD AUTO: 0.3 % — SIGNIFICANT CHANGE UP (ref 0–6)
EOSINOPHIL NFR BLD AUTO: 0.6 % — SIGNIFICANT CHANGE UP (ref 0–6)
EOSINOPHIL NFR BLD AUTO: 0.6 % — SIGNIFICANT CHANGE UP (ref 0–6)
EOSINOPHIL NFR BLD AUTO: 0.7 % — SIGNIFICANT CHANGE UP (ref 0–6)
EOSINOPHIL NFR BLD AUTO: 1 % — SIGNIFICANT CHANGE UP (ref 0–6)
EOSINOPHIL NFR BLD AUTO: 1.1 % — SIGNIFICANT CHANGE UP (ref 0–6)
EOSINOPHIL NFR BLD AUTO: 1.1 % — SIGNIFICANT CHANGE UP (ref 0–6)
EOSINOPHIL NFR BLD AUTO: 1.3 % — SIGNIFICANT CHANGE UP (ref 0–6)
EOSINOPHIL NFR BLD AUTO: 1.3 % — SIGNIFICANT CHANGE UP (ref 0–6)
EOSINOPHIL NFR BLD AUTO: 1.4 % — SIGNIFICANT CHANGE UP (ref 0–6)
EOSINOPHIL NFR BLD AUTO: 1.4 % — SIGNIFICANT CHANGE UP (ref 0–6)
EOSINOPHIL NFR BLD AUTO: 1.5 % — SIGNIFICANT CHANGE UP (ref 0–6)
EOSINOPHIL NFR BLD AUTO: 11 % — HIGH (ref 0–6)
EOSINOPHIL NFR BLD AUTO: 4.2 % — SIGNIFICANT CHANGE UP (ref 0–6)
EOSINOPHIL NFR BLD AUTO: 6.9 % — HIGH (ref 0–6)
EOSINOPHIL NFR BLD AUTO: 7.7 % — HIGH (ref 0–6)
EOSINOPHIL NFR BLD AUTO: 9.3 % — HIGH (ref 0–6)
EPI CELLS # UR: SIGNIFICANT CHANGE UP /HPF
ESTIMATED AVERAGE GLUCOSE: 114 MG/DL — SIGNIFICANT CHANGE UP (ref 68–114)
FERRITIN SERPL-MCNC: 1157 NG/ML — HIGH (ref 30–400)
FERRITIN SERPL-MCNC: 1170 NG/ML — HIGH (ref 30–400)
FERRITIN SERPL-MCNC: 1216 NG/ML — HIGH (ref 30–400)
FERRITIN SERPL-MCNC: 1355 NG/ML — HIGH (ref 30–400)
FERRITIN SERPL-MCNC: 1456 NG/ML — HIGH (ref 30–400)
FERRITIN SERPL-MCNC: 1665 NG/ML — HIGH (ref 30–400)
FERRITIN SERPL-MCNC: 1764 NG/ML — HIGH (ref 30–400)
FERRITIN SERPL-MCNC: 1999 NG/ML — HIGH (ref 30–400)
FERRITIN SERPL-MCNC: 2213 NG/ML — HIGH (ref 30–400)
FERRITIN SERPL-MCNC: 3618 NG/ML — HIGH (ref 30–400)
FERRITIN SERPL-MCNC: 6736 NG/ML — HIGH (ref 30–400)
FIBRINOGEN PPP-MCNC: 970 MG/DL — HIGH (ref 350–510)
GLUCOSE BLDC GLUCOMTR-MCNC: 105 MG/DL — HIGH (ref 70–99)
GLUCOSE BLDC GLUCOMTR-MCNC: 90 MG/DL — SIGNIFICANT CHANGE UP (ref 70–99)
GLUCOSE SERPL-MCNC: 102 MG/DL — HIGH (ref 70–99)
GLUCOSE SERPL-MCNC: 102 MG/DL — HIGH (ref 70–99)
GLUCOSE SERPL-MCNC: 104 MG/DL — HIGH (ref 70–99)
GLUCOSE SERPL-MCNC: 108 MG/DL — HIGH (ref 70–99)
GLUCOSE SERPL-MCNC: 110 MG/DL — HIGH (ref 70–99)
GLUCOSE SERPL-MCNC: 112 MG/DL — HIGH (ref 70–99)
GLUCOSE SERPL-MCNC: 117 MG/DL — HIGH (ref 70–99)
GLUCOSE SERPL-MCNC: 117 MG/DL — HIGH (ref 70–99)
GLUCOSE SERPL-MCNC: 118 MG/DL — HIGH (ref 70–99)
GLUCOSE SERPL-MCNC: 121 MG/DL — HIGH (ref 70–99)
GLUCOSE SERPL-MCNC: 122 MG/DL — HIGH (ref 70–99)
GLUCOSE SERPL-MCNC: 124 MG/DL — HIGH (ref 70–99)
GLUCOSE SERPL-MCNC: 128 MG/DL — HIGH (ref 70–99)
GLUCOSE SERPL-MCNC: 130 MG/DL — HIGH (ref 70–99)
GLUCOSE SERPL-MCNC: 134 MG/DL — HIGH (ref 70–99)
GLUCOSE SERPL-MCNC: 140 MG/DL — HIGH (ref 70–99)
GLUCOSE SERPL-MCNC: 150 MG/DL — HIGH (ref 70–99)
GLUCOSE SERPL-MCNC: 155 MG/DL — HIGH (ref 70–99)
GLUCOSE SERPL-MCNC: 179 MG/DL — HIGH (ref 70–99)
GLUCOSE SERPL-MCNC: 179 MG/DL — HIGH (ref 70–99)
GLUCOSE SERPL-MCNC: 199 MG/DL — HIGH (ref 70–99)
GLUCOSE SERPL-MCNC: 73 MG/DL — SIGNIFICANT CHANGE UP (ref 70–99)
GLUCOSE SERPL-MCNC: 76 MG/DL — SIGNIFICANT CHANGE UP (ref 70–99)
GLUCOSE SERPL-MCNC: 80 MG/DL — SIGNIFICANT CHANGE UP (ref 70–99)
GLUCOSE SERPL-MCNC: 81 MG/DL — SIGNIFICANT CHANGE UP (ref 70–99)
GLUCOSE SERPL-MCNC: 86 MG/DL — SIGNIFICANT CHANGE UP (ref 70–99)
GLUCOSE SERPL-MCNC: 87 MG/DL — SIGNIFICANT CHANGE UP (ref 70–99)
GLUCOSE SERPL-MCNC: 87 MG/DL — SIGNIFICANT CHANGE UP (ref 70–99)
GLUCOSE SERPL-MCNC: 90 MG/DL — SIGNIFICANT CHANGE UP (ref 70–99)
GLUCOSE SERPL-MCNC: 95 MG/DL — SIGNIFICANT CHANGE UP (ref 70–99)
GLUCOSE SERPL-MCNC: 95 MG/DL — SIGNIFICANT CHANGE UP (ref 70–99)
GLUCOSE SERPL-MCNC: 96 MG/DL — SIGNIFICANT CHANGE UP (ref 70–99)
GLUCOSE SERPL-MCNC: 97 MG/DL — SIGNIFICANT CHANGE UP (ref 70–99)
GLUCOSE SERPL-MCNC: 98 MG/DL — SIGNIFICANT CHANGE UP (ref 70–99)
GLUCOSE UR QL: NEGATIVE — SIGNIFICANT CHANGE UP
GRAM STN FLD: SIGNIFICANT CHANGE UP
GRAN CASTS # UR COMP ASSIST: ABNORMAL /LPF
HCO3 BLDA-SCNC: 23 MMOL/L — SIGNIFICANT CHANGE UP (ref 23–27)
HCO3 BLDA-SCNC: 24 MMOL/L — SIGNIFICANT CHANGE UP (ref 23–27)
HCO3 BLDA-SCNC: 25 MMOL/L — SIGNIFICANT CHANGE UP (ref 23–27)
HCO3 BLDA-SCNC: 26 MMOL/L — SIGNIFICANT CHANGE UP (ref 23–27)
HCO3 BLDA-SCNC: 26 MMOL/L — SIGNIFICANT CHANGE UP (ref 23–27)
HCO3 BLDA-SCNC: 27 MMOL/L — SIGNIFICANT CHANGE UP (ref 23–27)
HCO3 BLDA-SCNC: 28 MMOL/L — HIGH (ref 23–27)
HCO3 BLDA-SCNC: 28 MMOL/L — HIGH (ref 23–27)
HCO3 BLDA-SCNC: 29 MMOL/L — HIGH (ref 23–27)
HCO3 BLDA-SCNC: 30 MMOL/L — HIGH (ref 23–27)
HCO3 BLDA-SCNC: 31 MMOL/L — HIGH (ref 23–27)
HCO3 BLDA-SCNC: 32 MMOL/L — HIGH (ref 23–27)
HCO3 BLDA-SCNC: 33 MMOL/L — HIGH (ref 23–27)
HCO3 BLDA-SCNC: 34 MMOL/L — HIGH (ref 23–27)
HCO3 BLDA-SCNC: 35 MMOL/L — HIGH (ref 23–27)
HCO3 BLDA-SCNC: 35 MMOL/L — HIGH (ref 23–27)
HCO3 BLDA-SCNC: 37 MMOL/L — HIGH (ref 23–27)
HCO3 BLDA-SCNC: 39 MMOL/L — HIGH (ref 23–27)
HCO3 BLDA-SCNC: 40 MMOL/L — HIGH (ref 23–27)
HCT VFR BLD CALC: 22.2 % — LOW (ref 39–50)
HCT VFR BLD CALC: 23.7 % — LOW (ref 39–50)
HCT VFR BLD CALC: 25.1 % — LOW (ref 39–50)
HCT VFR BLD CALC: 25.6 % — LOW (ref 39–50)
HCT VFR BLD CALC: 25.9 % — LOW (ref 39–50)
HCT VFR BLD CALC: 26.1 % — LOW (ref 39–50)
HCT VFR BLD CALC: 26.4 % — LOW (ref 39–50)
HCT VFR BLD CALC: 26.5 % — LOW (ref 39–50)
HCT VFR BLD CALC: 26.8 % — LOW (ref 39–50)
HCT VFR BLD CALC: 27.1 % — LOW (ref 39–50)
HCT VFR BLD CALC: 27.1 % — LOW (ref 39–50)
HCT VFR BLD CALC: 27.6 % — LOW (ref 39–50)
HCT VFR BLD CALC: 27.7 % — LOW (ref 39–50)
HCT VFR BLD CALC: 27.8 % — LOW (ref 39–50)
HCT VFR BLD CALC: 32.9 % — LOW (ref 39–50)
HCT VFR BLD CALC: 34.3 % — LOW (ref 39–50)
HCT VFR BLD CALC: 34.6 % — LOW (ref 39–50)
HCT VFR BLD CALC: 34.9 % — LOW (ref 39–50)
HCT VFR BLD CALC: 36.7 % — LOW (ref 39–50)
HCT VFR BLD CALC: 37.3 % — LOW (ref 39–50)
HCT VFR BLD CALC: 37.5 % — LOW (ref 39–50)
HCT VFR BLD CALC: 38.4 % — LOW (ref 39–50)
HCT VFR BLD CALC: 38.5 % — LOW (ref 39–50)
HCT VFR BLD CALC: 38.7 % — LOW (ref 39–50)
HCT VFR BLD CALC: 38.7 % — LOW (ref 39–50)
HCT VFR BLD CALC: 39.1 % — SIGNIFICANT CHANGE UP (ref 39–50)
HCT VFR BLD CALC: 44.2 % — SIGNIFICANT CHANGE UP (ref 39–50)
HCT VFR BLD CALC: 44.9 % — SIGNIFICANT CHANGE UP (ref 39–50)
HCT VFR BLD CALC: 48.8 % — SIGNIFICANT CHANGE UP (ref 39–50)
HCT VFR BLD CALC: 49.6 % — SIGNIFICANT CHANGE UP (ref 39–50)
HDLC SERPL-MCNC: 24 MG/DL — LOW
HGB BLD-MCNC: 10.1 G/DL — LOW (ref 13–17)
HGB BLD-MCNC: 10.7 G/DL — LOW (ref 13–17)
HGB BLD-MCNC: 10.7 G/DL — LOW (ref 13–17)
HGB BLD-MCNC: 11.1 G/DL — LOW (ref 13–17)
HGB BLD-MCNC: 11.3 G/DL — LOW (ref 13–17)
HGB BLD-MCNC: 11.7 G/DL — LOW (ref 13–17)
HGB BLD-MCNC: 12.1 G/DL — LOW (ref 13–17)
HGB BLD-MCNC: 12.6 G/DL — LOW (ref 13–17)
HGB BLD-MCNC: 12.9 G/DL — LOW (ref 13–17)
HGB BLD-MCNC: 12.9 G/DL — LOW (ref 13–17)
HGB BLD-MCNC: 13.5 G/DL — SIGNIFICANT CHANGE UP (ref 13–17)
HGB BLD-MCNC: 14.4 G/DL — SIGNIFICANT CHANGE UP (ref 13–17)
HGB BLD-MCNC: 15.3 G/DL — SIGNIFICANT CHANGE UP (ref 13–17)
HGB BLD-MCNC: 16 G/DL — SIGNIFICANT CHANGE UP (ref 13–17)
HGB BLD-MCNC: 7 G/DL — CRITICAL LOW (ref 13–17)
HGB BLD-MCNC: 7.6 G/DL — LOW (ref 13–17)
HGB BLD-MCNC: 7.9 G/DL — LOW (ref 13–17)
HGB BLD-MCNC: 7.9 G/DL — LOW (ref 13–17)
HGB BLD-MCNC: 8 G/DL — LOW (ref 13–17)
HGB BLD-MCNC: 8.1 G/DL — LOW (ref 13–17)
HGB BLD-MCNC: 8.2 G/DL — LOW (ref 13–17)
HGB BLD-MCNC: 8.3 G/DL — LOW (ref 13–17)
HGB BLD-MCNC: 8.4 G/DL — LOW (ref 13–17)
HGB BLD-MCNC: 8.4 G/DL — LOW (ref 13–17)
HGB BLD-MCNC: 8.5 G/DL — LOW (ref 13–17)
HGB BLD-MCNC: 8.6 G/DL — LOW (ref 13–17)
HOROWITZ INDEX BLDA+IHG-RTO: 100 — SIGNIFICANT CHANGE UP
HOROWITZ INDEX BLDA+IHG-RTO: 60 — SIGNIFICANT CHANGE UP
HOROWITZ INDEX BLDA+IHG-RTO: 70 — SIGNIFICANT CHANGE UP
HOROWITZ INDEX BLDA+IHG-RTO: 75 — SIGNIFICANT CHANGE UP
HOROWITZ INDEX BLDA+IHG-RTO: 80 — SIGNIFICANT CHANGE UP
HOROWITZ INDEX BLDA+IHG-RTO: 85 — SIGNIFICANT CHANGE UP
HYALINE CASTS # UR AUTO: ABNORMAL /LPF
HYPOCHROMIA BLD QL: SLIGHT — SIGNIFICANT CHANGE UP
HYPOCHROMIA BLD QL: SLIGHT — SIGNIFICANT CHANGE UP
IMM GRANULOCYTES NFR BLD AUTO: 0.8 % — SIGNIFICANT CHANGE UP (ref 0–1.5)
IMM GRANULOCYTES NFR BLD AUTO: 0.9 % — SIGNIFICANT CHANGE UP (ref 0–1.5)
IMM GRANULOCYTES NFR BLD AUTO: 0.9 % — SIGNIFICANT CHANGE UP (ref 0–1.5)
IMM GRANULOCYTES NFR BLD AUTO: 1 % — SIGNIFICANT CHANGE UP (ref 0–1.5)
IMM GRANULOCYTES NFR BLD AUTO: 1.2 % — SIGNIFICANT CHANGE UP (ref 0–1.5)
IMM GRANULOCYTES NFR BLD AUTO: 1.2 % — SIGNIFICANT CHANGE UP (ref 0–1.5)
IMM GRANULOCYTES NFR BLD AUTO: 1.3 % — SIGNIFICANT CHANGE UP (ref 0–1.5)
IMM GRANULOCYTES NFR BLD AUTO: 1.4 % — SIGNIFICANT CHANGE UP (ref 0–1.5)
IMM GRANULOCYTES NFR BLD AUTO: 1.6 % — HIGH (ref 0–1.5)
IMM GRANULOCYTES NFR BLD AUTO: 1.6 % — HIGH (ref 0–1.5)
IMM GRANULOCYTES NFR BLD AUTO: 1.7 % — HIGH (ref 0–1.5)
IMM GRANULOCYTES NFR BLD AUTO: 1.8 % — HIGH (ref 0–1.5)
IMM GRANULOCYTES NFR BLD AUTO: 1.9 % — HIGH (ref 0–1.5)
IMM GRANULOCYTES NFR BLD AUTO: 2 % — HIGH (ref 0–1.5)
IMM GRANULOCYTES NFR BLD AUTO: 2 % — HIGH (ref 0–1.5)
IMM GRANULOCYTES NFR BLD AUTO: 2.5 % — HIGH (ref 0–1.5)
IMM GRANULOCYTES NFR BLD AUTO: 3.9 % — HIGH (ref 0–1.5)
IMM GRANULOCYTES NFR BLD AUTO: 4.8 % — HIGH (ref 0–1.5)
IMM GRANULOCYTES NFR BLD AUTO: 5.3 % — HIGH (ref 0–1.5)
IMM GRANULOCYTES NFR BLD AUTO: 6.1 % — HIGH (ref 0–1.5)
IMM GRANULOCYTES NFR BLD AUTO: 6.3 % — HIGH (ref 0–1.5)
IMM GRANULOCYTES NFR BLD AUTO: 7.1 % — HIGH (ref 0–1.5)
IMM GRANULOCYTES NFR BLD AUTO: 7.8 % — HIGH (ref 0–1.5)
INR BLD: 1.05 RATIO — SIGNIFICANT CHANGE UP (ref 0.88–1.16)
INR BLD: 1.05 RATIO — SIGNIFICANT CHANGE UP (ref 0.88–1.16)
INR BLD: 1.1 RATIO — SIGNIFICANT CHANGE UP (ref 0.88–1.16)
INR BLD: 1.19 RATIO — HIGH (ref 0.88–1.16)
INR BLD: 1.24 RATIO — HIGH (ref 0.88–1.16)
INR BLD: 1.38 RATIO — HIGH (ref 0.88–1.16)
KETONES UR-MCNC: NEGATIVE — SIGNIFICANT CHANGE UP
LDH SERPL L TO P-CCNC: 448 U/L — HIGH (ref 120–225)
LDH SERPL L TO P-CCNC: 496 U/L — HIGH (ref 120–225)
LEUKOCYTE ESTERASE UR-ACNC: NEGATIVE — SIGNIFICANT CHANGE UP
LG PLATELETS BLD QL AUTO: SLIGHT — SIGNIFICANT CHANGE UP
LIPID PNL WITH DIRECT LDL SERPL: 78 MG/DL — SIGNIFICANT CHANGE UP
LYMPHOCYTES # BLD AUTO: 0.23 K/UL — LOW (ref 1–3.3)
LYMPHOCYTES # BLD AUTO: 0.35 K/UL — LOW (ref 1–3.3)
LYMPHOCYTES # BLD AUTO: 0.44 K/UL — LOW (ref 1–3.3)
LYMPHOCYTES # BLD AUTO: 0.49 K/UL — LOW (ref 1–3.3)
LYMPHOCYTES # BLD AUTO: 0.61 K/UL — LOW (ref 1–3.3)
LYMPHOCYTES # BLD AUTO: 0.67 K/UL — LOW (ref 1–3.3)
LYMPHOCYTES # BLD AUTO: 0.84 K/UL — LOW (ref 1–3.3)
LYMPHOCYTES # BLD AUTO: 0.88 K/UL — LOW (ref 1–3.3)
LYMPHOCYTES # BLD AUTO: 0.89 K/UL — LOW (ref 1–3.3)
LYMPHOCYTES # BLD AUTO: 0.9 K/UL — LOW (ref 1–3.3)
LYMPHOCYTES # BLD AUTO: 0.91 K/UL — LOW (ref 1–3.3)
LYMPHOCYTES # BLD AUTO: 0.92 K/UL — LOW (ref 1–3.3)
LYMPHOCYTES # BLD AUTO: 0.93 K/UL — LOW (ref 1–3.3)
LYMPHOCYTES # BLD AUTO: 0.98 K/UL — LOW (ref 1–3.3)
LYMPHOCYTES # BLD AUTO: 0.99 K/UL — LOW (ref 1–3.3)
LYMPHOCYTES # BLD AUTO: 1 % — LOW (ref 13–44)
LYMPHOCYTES # BLD AUTO: 1.06 K/UL — SIGNIFICANT CHANGE UP (ref 1–3.3)
LYMPHOCYTES # BLD AUTO: 1.09 K/UL — SIGNIFICANT CHANGE UP (ref 1–3.3)
LYMPHOCYTES # BLD AUTO: 1.12 K/UL — SIGNIFICANT CHANGE UP (ref 1–3.3)
LYMPHOCYTES # BLD AUTO: 1.18 K/UL — SIGNIFICANT CHANGE UP (ref 1–3.3)
LYMPHOCYTES # BLD AUTO: 1.2 K/UL — SIGNIFICANT CHANGE UP (ref 1–3.3)
LYMPHOCYTES # BLD AUTO: 1.23 K/UL — SIGNIFICANT CHANGE UP (ref 1–3.3)
LYMPHOCYTES # BLD AUTO: 1.42 K/UL — SIGNIFICANT CHANGE UP (ref 1–3.3)
LYMPHOCYTES # BLD AUTO: 1.63 K/UL — SIGNIFICANT CHANGE UP (ref 1–3.3)
LYMPHOCYTES # BLD AUTO: 1.72 K/UL — SIGNIFICANT CHANGE UP (ref 1–3.3)
LYMPHOCYTES # BLD AUTO: 1.9 K/UL — SIGNIFICANT CHANGE UP (ref 1–3.3)
LYMPHOCYTES # BLD AUTO: 2.7 % — LOW (ref 13–44)
LYMPHOCYTES # BLD AUTO: 2.8 % — LOW (ref 13–44)
LYMPHOCYTES # BLD AUTO: 3 % — LOW (ref 13–44)
LYMPHOCYTES # BLD AUTO: 3.5 % — LOW (ref 13–44)
LYMPHOCYTES # BLD AUTO: 4 % — LOW (ref 13–44)
LYMPHOCYTES # BLD AUTO: 4.1 % — LOW (ref 13–44)
LYMPHOCYTES # BLD AUTO: 4.3 % — LOW (ref 13–44)
LYMPHOCYTES # BLD AUTO: 4.3 % — LOW (ref 13–44)
LYMPHOCYTES # BLD AUTO: 4.5 % — LOW (ref 13–44)
LYMPHOCYTES # BLD AUTO: 4.5 % — LOW (ref 13–44)
LYMPHOCYTES # BLD AUTO: 4.8 % — LOW (ref 13–44)
LYMPHOCYTES # BLD AUTO: 5.1 % — LOW (ref 13–44)
LYMPHOCYTES # BLD AUTO: 5.3 % — LOW (ref 13–44)
LYMPHOCYTES # BLD AUTO: 5.4 % — LOW (ref 13–44)
LYMPHOCYTES # BLD AUTO: 5.8 % — LOW (ref 13–44)
LYMPHOCYTES # BLD AUTO: 6.1 % — LOW (ref 13–44)
LYMPHOCYTES # BLD AUTO: 6.2 % — LOW (ref 13–44)
LYMPHOCYTES # BLD AUTO: 6.3 % — LOW (ref 13–44)
LYMPHOCYTES # BLD AUTO: 6.7 % — LOW (ref 13–44)
LYMPHOCYTES # BLD AUTO: 6.9 % — LOW (ref 13–44)
LYMPHOCYTES # BLD AUTO: 6.9 % — LOW (ref 13–44)
LYMPHOCYTES # BLD AUTO: 7 % — LOW (ref 13–44)
LYMPHOCYTES # BLD AUTO: 7.5 % — LOW (ref 13–44)
LYMPHOCYTES # BLD AUTO: 7.8 % — LOW (ref 13–44)
MAGNESIUM SERPL-MCNC: 2 MG/DL — SIGNIFICANT CHANGE UP (ref 1.6–2.6)
MAGNESIUM SERPL-MCNC: 2.2 MG/DL — SIGNIFICANT CHANGE UP (ref 1.6–2.6)
MAGNESIUM SERPL-MCNC: 2.2 MG/DL — SIGNIFICANT CHANGE UP (ref 1.6–2.6)
MAGNESIUM SERPL-MCNC: 2.3 MG/DL — SIGNIFICANT CHANGE UP (ref 1.6–2.6)
MAGNESIUM SERPL-MCNC: 2.4 MG/DL — SIGNIFICANT CHANGE UP (ref 1.6–2.6)
MAGNESIUM SERPL-MCNC: 2.5 MG/DL — SIGNIFICANT CHANGE UP (ref 1.6–2.6)
MAGNESIUM SERPL-MCNC: 2.6 MG/DL — SIGNIFICANT CHANGE UP (ref 1.6–2.6)
MAGNESIUM SERPL-MCNC: 2.6 MG/DL — SIGNIFICANT CHANGE UP (ref 1.6–2.6)
MAGNESIUM SERPL-MCNC: 2.7 MG/DL — HIGH (ref 1.6–2.6)
MAGNESIUM SERPL-MCNC: 2.7 MG/DL — HIGH (ref 1.6–2.6)
MAGNESIUM SERPL-MCNC: 2.8 MG/DL — HIGH (ref 1.6–2.6)
MAGNESIUM SERPL-MCNC: 2.8 MG/DL — HIGH (ref 1.6–2.6)
MAGNESIUM SERPL-MCNC: 2.9 MG/DL — HIGH (ref 1.6–2.6)
MAGNESIUM SERPL-MCNC: 2.9 MG/DL — HIGH (ref 1.6–2.6)
MANUAL SMEAR VERIFICATION: SIGNIFICANT CHANGE UP
MCHC RBC-ENTMCNC: 28.6 PG — SIGNIFICANT CHANGE UP (ref 27–34)
MCHC RBC-ENTMCNC: 28.8 PG — SIGNIFICANT CHANGE UP (ref 27–34)
MCHC RBC-ENTMCNC: 28.9 PG — SIGNIFICANT CHANGE UP (ref 27–34)
MCHC RBC-ENTMCNC: 29 PG — SIGNIFICANT CHANGE UP (ref 27–34)
MCHC RBC-ENTMCNC: 29.1 PG — SIGNIFICANT CHANGE UP (ref 27–34)
MCHC RBC-ENTMCNC: 29.2 PG — SIGNIFICANT CHANGE UP (ref 27–34)
MCHC RBC-ENTMCNC: 29.3 PG — SIGNIFICANT CHANGE UP (ref 27–34)
MCHC RBC-ENTMCNC: 29.4 PG — SIGNIFICANT CHANGE UP (ref 27–34)
MCHC RBC-ENTMCNC: 29.4 PG — SIGNIFICANT CHANGE UP (ref 27–34)
MCHC RBC-ENTMCNC: 29.5 PG — SIGNIFICANT CHANGE UP (ref 27–34)
MCHC RBC-ENTMCNC: 29.6 GM/DL — LOW (ref 32–36)
MCHC RBC-ENTMCNC: 29.6 PG — SIGNIFICANT CHANGE UP (ref 27–34)
MCHC RBC-ENTMCNC: 29.7 PG — SIGNIFICANT CHANGE UP (ref 27–34)
MCHC RBC-ENTMCNC: 29.7 PG — SIGNIFICANT CHANGE UP (ref 27–34)
MCHC RBC-ENTMCNC: 29.8 PG — SIGNIFICANT CHANGE UP (ref 27–34)
MCHC RBC-ENTMCNC: 29.9 GM/DL — LOW (ref 32–36)
MCHC RBC-ENTMCNC: 29.9 PG — SIGNIFICANT CHANGE UP (ref 27–34)
MCHC RBC-ENTMCNC: 30 PG — SIGNIFICANT CHANGE UP (ref 27–34)
MCHC RBC-ENTMCNC: 30.1 PG — SIGNIFICANT CHANGE UP (ref 27–34)
MCHC RBC-ENTMCNC: 30.2 GM/DL — LOW (ref 32–36)
MCHC RBC-ENTMCNC: 30.2 PG — SIGNIFICANT CHANGE UP (ref 27–34)
MCHC RBC-ENTMCNC: 30.3 GM/DL — LOW (ref 32–36)
MCHC RBC-ENTMCNC: 30.5 GM/DL — LOW (ref 32–36)
MCHC RBC-ENTMCNC: 30.6 GM/DL — LOW (ref 32–36)
MCHC RBC-ENTMCNC: 30.7 GM/DL — LOW (ref 32–36)
MCHC RBC-ENTMCNC: 30.8 GM/DL — LOW (ref 32–36)
MCHC RBC-ENTMCNC: 30.8 GM/DL — LOW (ref 32–36)
MCHC RBC-ENTMCNC: 30.9 GM/DL — LOW (ref 32–36)
MCHC RBC-ENTMCNC: 31 GM/DL — LOW (ref 32–36)
MCHC RBC-ENTMCNC: 31.1 GM/DL — LOW (ref 32–36)
MCHC RBC-ENTMCNC: 31.2 GM/DL — LOW (ref 32–36)
MCHC RBC-ENTMCNC: 31.4 GM/DL — LOW (ref 32–36)
MCHC RBC-ENTMCNC: 31.5 GM/DL — LOW (ref 32–36)
MCHC RBC-ENTMCNC: 31.5 GM/DL — LOW (ref 32–36)
MCHC RBC-ENTMCNC: 31.6 GM/DL — LOW (ref 32–36)
MCHC RBC-ENTMCNC: 31.7 GM/DL — LOW (ref 32–36)
MCHC RBC-ENTMCNC: 31.7 GM/DL — LOW (ref 32–36)
MCHC RBC-ENTMCNC: 31.8 GM/DL — LOW (ref 32–36)
MCHC RBC-ENTMCNC: 32.1 GM/DL — SIGNIFICANT CHANGE UP (ref 32–36)
MCHC RBC-ENTMCNC: 32.1 GM/DL — SIGNIFICANT CHANGE UP (ref 32–36)
MCHC RBC-ENTMCNC: 32.3 GM/DL — SIGNIFICANT CHANGE UP (ref 32–36)
MCHC RBC-ENTMCNC: 32.7 GM/DL — SIGNIFICANT CHANGE UP (ref 32–36)
MCHC RBC-ENTMCNC: 32.8 GM/DL — SIGNIFICANT CHANGE UP (ref 32–36)
MCHC RBC-ENTMCNC: 33.3 GM/DL — SIGNIFICANT CHANGE UP (ref 32–36)
MCHC RBC-ENTMCNC: 33.8 GM/DL — SIGNIFICANT CHANGE UP (ref 32–36)
MCHC RBC-ENTMCNC: 34.4 GM/DL — SIGNIFICANT CHANGE UP (ref 32–36)
MCV RBC AUTO: 86.4 FL — SIGNIFICANT CHANGE UP (ref 80–100)
MCV RBC AUTO: 87.4 FL — SIGNIFICANT CHANGE UP (ref 80–100)
MCV RBC AUTO: 87.6 FL — SIGNIFICANT CHANGE UP (ref 80–100)
MCV RBC AUTO: 88.1 FL — SIGNIFICANT CHANGE UP (ref 80–100)
MCV RBC AUTO: 88.7 FL — SIGNIFICANT CHANGE UP (ref 80–100)
MCV RBC AUTO: 90.2 FL — SIGNIFICANT CHANGE UP (ref 80–100)
MCV RBC AUTO: 90.2 FL — SIGNIFICANT CHANGE UP (ref 80–100)
MCV RBC AUTO: 92.1 FL — SIGNIFICANT CHANGE UP (ref 80–100)
MCV RBC AUTO: 92.5 FL — SIGNIFICANT CHANGE UP (ref 80–100)
MCV RBC AUTO: 92.6 FL — SIGNIFICANT CHANGE UP (ref 80–100)
MCV RBC AUTO: 93 FL — SIGNIFICANT CHANGE UP (ref 80–100)
MCV RBC AUTO: 94 FL — SIGNIFICANT CHANGE UP (ref 80–100)
MCV RBC AUTO: 94.3 FL — SIGNIFICANT CHANGE UP (ref 80–100)
MCV RBC AUTO: 94.5 FL — SIGNIFICANT CHANGE UP (ref 80–100)
MCV RBC AUTO: 94.8 FL — SIGNIFICANT CHANGE UP (ref 80–100)
MCV RBC AUTO: 94.8 FL — SIGNIFICANT CHANGE UP (ref 80–100)
MCV RBC AUTO: 95 FL — SIGNIFICANT CHANGE UP (ref 80–100)
MCV RBC AUTO: 95.1 FL — SIGNIFICANT CHANGE UP (ref 80–100)
MCV RBC AUTO: 95.2 FL — SIGNIFICANT CHANGE UP (ref 80–100)
MCV RBC AUTO: 95.5 FL — SIGNIFICANT CHANGE UP (ref 80–100)
MCV RBC AUTO: 95.6 FL — SIGNIFICANT CHANGE UP (ref 80–100)
MCV RBC AUTO: 95.6 FL — SIGNIFICANT CHANGE UP (ref 80–100)
MCV RBC AUTO: 96.1 FL — SIGNIFICANT CHANGE UP (ref 80–100)
MCV RBC AUTO: 96.2 FL — SIGNIFICANT CHANGE UP (ref 80–100)
MCV RBC AUTO: 96.5 FL — SIGNIFICANT CHANGE UP (ref 80–100)
MCV RBC AUTO: 96.5 FL — SIGNIFICANT CHANGE UP (ref 80–100)
MCV RBC AUTO: 97 FL — SIGNIFICANT CHANGE UP (ref 80–100)
MCV RBC AUTO: 97.1 FL — SIGNIFICANT CHANGE UP (ref 80–100)
METHOD TYPE: SIGNIFICANT CHANGE UP
METHOD TYPE: SIGNIFICANT CHANGE UP
MICROCYTES BLD QL: SLIGHT — SIGNIFICANT CHANGE UP
MONOCYTES # BLD AUTO: 0.23 K/UL — SIGNIFICANT CHANGE UP (ref 0–0.9)
MONOCYTES # BLD AUTO: 0.26 K/UL — SIGNIFICANT CHANGE UP (ref 0–0.9)
MONOCYTES # BLD AUTO: 0.29 K/UL — SIGNIFICANT CHANGE UP (ref 0–0.9)
MONOCYTES # BLD AUTO: 0.3 K/UL — SIGNIFICANT CHANGE UP (ref 0–0.9)
MONOCYTES # BLD AUTO: 0.36 K/UL — SIGNIFICANT CHANGE UP (ref 0–0.9)
MONOCYTES # BLD AUTO: 0.38 K/UL — SIGNIFICANT CHANGE UP (ref 0–0.9)
MONOCYTES # BLD AUTO: 0.38 K/UL — SIGNIFICANT CHANGE UP (ref 0–0.9)
MONOCYTES # BLD AUTO: 0.42 K/UL — SIGNIFICANT CHANGE UP (ref 0–0.9)
MONOCYTES # BLD AUTO: 0.43 K/UL — SIGNIFICANT CHANGE UP (ref 0–0.9)
MONOCYTES # BLD AUTO: 0.51 K/UL — SIGNIFICANT CHANGE UP (ref 0–0.9)
MONOCYTES # BLD AUTO: 0.54 K/UL — SIGNIFICANT CHANGE UP (ref 0–0.9)
MONOCYTES # BLD AUTO: 0.55 K/UL — SIGNIFICANT CHANGE UP (ref 0–0.9)
MONOCYTES # BLD AUTO: 0.63 K/UL — SIGNIFICANT CHANGE UP (ref 0–0.9)
MONOCYTES # BLD AUTO: 0.69 K/UL — SIGNIFICANT CHANGE UP (ref 0–0.9)
MONOCYTES # BLD AUTO: 0.71 K/UL — SIGNIFICANT CHANGE UP (ref 0–0.9)
MONOCYTES # BLD AUTO: 0.73 K/UL — SIGNIFICANT CHANGE UP (ref 0–0.9)
MONOCYTES # BLD AUTO: 0.75 K/UL — SIGNIFICANT CHANGE UP (ref 0–0.9)
MONOCYTES # BLD AUTO: 0.78 K/UL — SIGNIFICANT CHANGE UP (ref 0–0.9)
MONOCYTES # BLD AUTO: 0.78 K/UL — SIGNIFICANT CHANGE UP (ref 0–0.9)
MONOCYTES # BLD AUTO: 0.81 K/UL — SIGNIFICANT CHANGE UP (ref 0–0.9)
MONOCYTES # BLD AUTO: 0.9 K/UL — SIGNIFICANT CHANGE UP (ref 0–0.9)
MONOCYTES # BLD AUTO: 1.02 K/UL — HIGH (ref 0–0.9)
MONOCYTES # BLD AUTO: 1.1 K/UL — HIGH (ref 0–0.9)
MONOCYTES # BLD AUTO: 1.37 K/UL — HIGH (ref 0–0.9)
MONOCYTES # BLD AUTO: 1.41 K/UL — HIGH (ref 0–0.9)
MONOCYTES NFR BLD AUTO: 1 % — LOW (ref 2–14)
MONOCYTES NFR BLD AUTO: 1.9 % — LOW (ref 2–14)
MONOCYTES NFR BLD AUTO: 2.1 % — SIGNIFICANT CHANGE UP (ref 2–14)
MONOCYTES NFR BLD AUTO: 2.3 % — SIGNIFICANT CHANGE UP (ref 2–14)
MONOCYTES NFR BLD AUTO: 2.4 % — SIGNIFICANT CHANGE UP (ref 2–14)
MONOCYTES NFR BLD AUTO: 2.4 % — SIGNIFICANT CHANGE UP (ref 2–14)
MONOCYTES NFR BLD AUTO: 2.5 % — SIGNIFICANT CHANGE UP (ref 2–14)
MONOCYTES NFR BLD AUTO: 2.7 % — SIGNIFICANT CHANGE UP (ref 2–14)
MONOCYTES NFR BLD AUTO: 2.9 % — SIGNIFICANT CHANGE UP (ref 2–14)
MONOCYTES NFR BLD AUTO: 3 % — SIGNIFICANT CHANGE UP (ref 2–14)
MONOCYTES NFR BLD AUTO: 3 % — SIGNIFICANT CHANGE UP (ref 2–14)
MONOCYTES NFR BLD AUTO: 3.1 % — SIGNIFICANT CHANGE UP (ref 2–14)
MONOCYTES NFR BLD AUTO: 3.3 % — SIGNIFICANT CHANGE UP (ref 2–14)
MONOCYTES NFR BLD AUTO: 4 % — SIGNIFICANT CHANGE UP (ref 2–14)
MONOCYTES NFR BLD AUTO: 4.1 % — SIGNIFICANT CHANGE UP (ref 2–14)
MONOCYTES NFR BLD AUTO: 4.5 % — SIGNIFICANT CHANGE UP (ref 2–14)
MONOCYTES NFR BLD AUTO: 4.5 % — SIGNIFICANT CHANGE UP (ref 2–14)
MONOCYTES NFR BLD AUTO: 4.6 % — SIGNIFICANT CHANGE UP (ref 2–14)
MONOCYTES NFR BLD AUTO: 5.8 % — SIGNIFICANT CHANGE UP (ref 2–14)
MONOCYTES NFR BLD AUTO: 6.5 % — SIGNIFICANT CHANGE UP (ref 2–14)
MONOCYTES NFR BLD AUTO: 6.7 % — SIGNIFICANT CHANGE UP (ref 2–14)
MRSA PCR RESULT.: SIGNIFICANT CHANGE UP
MYELOCYTES NFR BLD: 2 % — HIGH (ref 0–0)
NEUTROPHILS # BLD AUTO: 10.49 K/UL — HIGH (ref 1.8–7.4)
NEUTROPHILS # BLD AUTO: 12.85 K/UL — HIGH (ref 1.8–7.4)
NEUTROPHILS # BLD AUTO: 13.36 K/UL — HIGH (ref 1.8–7.4)
NEUTROPHILS # BLD AUTO: 15.39 K/UL — HIGH (ref 1.8–7.4)
NEUTROPHILS # BLD AUTO: 15.91 K/UL — HIGH (ref 1.8–7.4)
NEUTROPHILS # BLD AUTO: 16.01 K/UL — HIGH (ref 1.8–7.4)
NEUTROPHILS # BLD AUTO: 16.51 K/UL — HIGH (ref 1.8–7.4)
NEUTROPHILS # BLD AUTO: 17.02 K/UL — HIGH (ref 1.8–7.4)
NEUTROPHILS # BLD AUTO: 17.03 K/UL — HIGH (ref 1.8–7.4)
NEUTROPHILS # BLD AUTO: 17.76 K/UL — HIGH (ref 1.8–7.4)
NEUTROPHILS # BLD AUTO: 17.84 K/UL — HIGH (ref 1.8–7.4)
NEUTROPHILS # BLD AUTO: 19.03 K/UL — HIGH (ref 1.8–7.4)
NEUTROPHILS # BLD AUTO: 20.69 K/UL — HIGH (ref 1.8–7.4)
NEUTROPHILS # BLD AUTO: 20.78 K/UL — HIGH (ref 1.8–7.4)
NEUTROPHILS # BLD AUTO: 21.17 K/UL — HIGH (ref 1.8–7.4)
NEUTROPHILS # BLD AUTO: 21.6 K/UL — HIGH (ref 1.8–7.4)
NEUTROPHILS # BLD AUTO: 23.32 K/UL — HIGH (ref 1.8–7.4)
NEUTROPHILS # BLD AUTO: 24.09 K/UL — HIGH (ref 1.8–7.4)
NEUTROPHILS # BLD AUTO: 27.16 K/UL — HIGH (ref 1.8–7.4)
NEUTROPHILS # BLD AUTO: 28.83 K/UL — HIGH (ref 1.8–7.4)
NEUTROPHILS # BLD AUTO: 30.59 K/UL — HIGH (ref 1.8–7.4)
NEUTROPHILS # BLD AUTO: 5.65 K/UL — SIGNIFICANT CHANGE UP (ref 1.8–7.4)
NEUTROPHILS # BLD AUTO: 7.74 K/UL — HIGH (ref 1.8–7.4)
NEUTROPHILS # BLD AUTO: 9.11 K/UL — HIGH (ref 1.8–7.4)
NEUTROPHILS # BLD AUTO: 9.75 K/UL — HIGH (ref 1.8–7.4)
NEUTROPHILS NFR BLD AUTO: 71 % — SIGNIFICANT CHANGE UP (ref 43–77)
NEUTROPHILS NFR BLD AUTO: 74.3 % — SIGNIFICANT CHANGE UP (ref 43–77)
NEUTROPHILS NFR BLD AUTO: 80.5 % — HIGH (ref 43–77)
NEUTROPHILS NFR BLD AUTO: 81 % — HIGH (ref 43–77)
NEUTROPHILS NFR BLD AUTO: 81.1 % — HIGH (ref 43–77)
NEUTROPHILS NFR BLD AUTO: 81.4 % — HIGH (ref 43–77)
NEUTROPHILS NFR BLD AUTO: 81.7 % — HIGH (ref 43–77)
NEUTROPHILS NFR BLD AUTO: 84.9 % — HIGH (ref 43–77)
NEUTROPHILS NFR BLD AUTO: 85.4 % — HIGH (ref 43–77)
NEUTROPHILS NFR BLD AUTO: 85.9 % — HIGH (ref 43–77)
NEUTROPHILS NFR BLD AUTO: 86.9 % — HIGH (ref 43–77)
NEUTROPHILS NFR BLD AUTO: 87.2 % — HIGH (ref 43–77)
NEUTROPHILS NFR BLD AUTO: 88 % — HIGH (ref 43–77)
NEUTROPHILS NFR BLD AUTO: 88.1 % — HIGH (ref 43–77)
NEUTROPHILS NFR BLD AUTO: 89 % — HIGH (ref 43–77)
NEUTROPHILS NFR BLD AUTO: 89.3 % — HIGH (ref 43–77)
NEUTROPHILS NFR BLD AUTO: 90 % — HIGH (ref 43–77)
NEUTROPHILS NFR BLD AUTO: 90.2 % — HIGH (ref 43–77)
NEUTROPHILS NFR BLD AUTO: 91 % — HIGH (ref 43–77)
NEUTROPHILS NFR BLD AUTO: 91 % — HIGH (ref 43–77)
NEUTROPHILS NFR BLD AUTO: 92.1 % — HIGH (ref 43–77)
NEUTROPHILS NFR BLD AUTO: 92.2 % — HIGH (ref 43–77)
NEUTROPHILS NFR BLD AUTO: 92.4 % — HIGH (ref 43–77)
NEUTROPHILS NFR BLD AUTO: 92.4 % — HIGH (ref 43–77)
NEUTROPHILS NFR BLD AUTO: 92.7 % — HIGH (ref 43–77)
NEUTS BAND # BLD: 4 % — SIGNIFICANT CHANGE UP (ref 0–8)
NITRITE UR-MCNC: NEGATIVE — SIGNIFICANT CHANGE UP
NRBC # BLD: 0 /100 WBCS — SIGNIFICANT CHANGE UP (ref 0–0)
NRBC # BLD: 0 /100 — SIGNIFICANT CHANGE UP (ref 0–0)
NRBC # BLD: 1 /100 WBCS — HIGH (ref 0–0)
ORGANISM # SPEC MICROSCOPIC CNT: SIGNIFICANT CHANGE UP
PCO2 BLDA: 31 MMHG — LOW (ref 32–46)
PCO2 BLDA: 47 MMHG — HIGH (ref 32–46)
PCO2 BLDA: 49 MMHG — HIGH (ref 32–46)
PCO2 BLDA: 49 MMHG — HIGH (ref 32–46)
PCO2 BLDA: 50 MMHG — HIGH (ref 32–46)
PCO2 BLDA: 51 MMHG — HIGH (ref 32–46)
PCO2 BLDA: 51 MMHG — HIGH (ref 32–46)
PCO2 BLDA: 53 MMHG — HIGH (ref 32–46)
PCO2 BLDA: 54 MMHG — HIGH (ref 32–46)
PCO2 BLDA: 55 MMHG — HIGH (ref 32–46)
PCO2 BLDA: 57 MMHG — HIGH (ref 32–46)
PCO2 BLDA: 58 MMHG — HIGH (ref 32–46)
PCO2 BLDA: 60 MMHG — HIGH (ref 32–46)
PCO2 BLDA: 61 MMHG — HIGH (ref 32–46)
PCO2 BLDA: 61 MMHG — HIGH (ref 32–46)
PCO2 BLDA: 63 MMHG — HIGH (ref 32–46)
PCO2 BLDA: 64 MMHG — HIGH (ref 32–46)
PCO2 BLDA: 65 MMHG — HIGH (ref 32–46)
PCO2 BLDA: 66 MMHG — HIGH (ref 32–46)
PCO2 BLDA: 66 MMHG — HIGH (ref 32–46)
PCO2 BLDA: 67 MMHG — HIGH (ref 32–46)
PCO2 BLDA: 68 MMHG — HIGH (ref 32–46)
PCO2 BLDA: 69 MMHG — HIGH (ref 32–46)
PCO2 BLDA: 70 MMHG — CRITICAL HIGH (ref 32–46)
PCO2 BLDA: 71 MMHG — CRITICAL HIGH (ref 32–46)
PCO2 BLDA: 75 MMHG — CRITICAL HIGH (ref 32–46)
PCO2 BLDA: 77 MMHG — CRITICAL HIGH (ref 32–46)
PCO2 BLDA: 80 MMHG — CRITICAL HIGH (ref 32–46)
PH BLDA: 7.2 — CRITICAL LOW (ref 7.35–7.45)
PH BLDA: 7.22 — LOW (ref 7.35–7.45)
PH BLDA: 7.23 — LOW (ref 7.35–7.45)
PH BLDA: 7.24 — LOW (ref 7.35–7.45)
PH BLDA: 7.25 — LOW (ref 7.35–7.45)
PH BLDA: 7.27 — LOW (ref 7.35–7.45)
PH BLDA: 7.28 — LOW (ref 7.35–7.45)
PH BLDA: 7.29 — LOW (ref 7.35–7.45)
PH BLDA: 7.3 — LOW (ref 7.35–7.45)
PH BLDA: 7.3 — LOW (ref 7.35–7.45)
PH BLDA: 7.31 — LOW (ref 7.35–7.45)
PH BLDA: 7.32 — LOW (ref 7.35–7.45)
PH BLDA: 7.32 — LOW (ref 7.35–7.45)
PH BLDA: 7.33 — LOW (ref 7.35–7.45)
PH BLDA: 7.34 — LOW (ref 7.35–7.45)
PH BLDA: 7.35 — SIGNIFICANT CHANGE UP (ref 7.35–7.45)
PH BLDA: 7.35 — SIGNIFICANT CHANGE UP (ref 7.35–7.45)
PH BLDA: 7.36 — SIGNIFICANT CHANGE UP (ref 7.35–7.45)
PH BLDA: 7.37 — SIGNIFICANT CHANGE UP (ref 7.35–7.45)
PH BLDA: 7.38 — SIGNIFICANT CHANGE UP (ref 7.35–7.45)
PH BLDA: 7.39 — SIGNIFICANT CHANGE UP (ref 7.35–7.45)
PH BLDA: 7.4 — SIGNIFICANT CHANGE UP (ref 7.35–7.45)
PH BLDA: 7.41 — SIGNIFICANT CHANGE UP (ref 7.35–7.45)
PH BLDA: 7.41 — SIGNIFICANT CHANGE UP (ref 7.35–7.45)
PH BLDA: 7.42 — SIGNIFICANT CHANGE UP (ref 7.35–7.45)
PH BLDA: 7.43 — SIGNIFICANT CHANGE UP (ref 7.35–7.45)
PH BLDA: 7.44 — SIGNIFICANT CHANGE UP (ref 7.35–7.45)
PH BLDA: 7.44 — SIGNIFICANT CHANGE UP (ref 7.35–7.45)
PH BLDA: 7.48 — HIGH (ref 7.35–7.45)
PH UR: 5 — SIGNIFICANT CHANGE UP (ref 5–8)
PHOSPHATE SERPL-MCNC: 1.2 MG/DL — LOW (ref 2.5–4.5)
PHOSPHATE SERPL-MCNC: 1.4 MG/DL — LOW (ref 2.5–4.5)
PHOSPHATE SERPL-MCNC: 1.5 MG/DL — LOW (ref 2.5–4.5)
PHOSPHATE SERPL-MCNC: 1.6 MG/DL — LOW (ref 2.5–4.5)
PHOSPHATE SERPL-MCNC: 1.8 MG/DL — LOW (ref 2.5–4.5)
PHOSPHATE SERPL-MCNC: 1.9 MG/DL — LOW (ref 2.5–4.5)
PHOSPHATE SERPL-MCNC: 2.1 MG/DL — LOW (ref 2.5–4.5)
PHOSPHATE SERPL-MCNC: 2.4 MG/DL — LOW (ref 2.5–4.5)
PHOSPHATE SERPL-MCNC: 2.5 MG/DL — SIGNIFICANT CHANGE UP (ref 2.5–4.5)
PHOSPHATE SERPL-MCNC: 2.6 MG/DL — SIGNIFICANT CHANGE UP (ref 2.5–4.5)
PHOSPHATE SERPL-MCNC: 2.7 MG/DL — SIGNIFICANT CHANGE UP (ref 2.5–4.5)
PHOSPHATE SERPL-MCNC: 2.8 MG/DL — SIGNIFICANT CHANGE UP (ref 2.5–4.5)
PHOSPHATE SERPL-MCNC: 3 MG/DL — SIGNIFICANT CHANGE UP (ref 2.5–4.5)
PHOSPHATE SERPL-MCNC: 3 MG/DL — SIGNIFICANT CHANGE UP (ref 2.5–4.5)
PHOSPHATE SERPL-MCNC: 3.2 MG/DL — SIGNIFICANT CHANGE UP (ref 2.5–4.5)
PHOSPHATE SERPL-MCNC: 3.2 MG/DL — SIGNIFICANT CHANGE UP (ref 2.5–4.5)
PHOSPHATE SERPL-MCNC: 3.3 MG/DL — SIGNIFICANT CHANGE UP (ref 2.5–4.5)
PHOSPHATE SERPL-MCNC: 3.6 MG/DL — SIGNIFICANT CHANGE UP (ref 2.5–4.5)
PHOSPHATE SERPL-MCNC: 3.6 MG/DL — SIGNIFICANT CHANGE UP (ref 2.5–4.5)
PHOSPHATE SERPL-MCNC: 3.8 MG/DL — SIGNIFICANT CHANGE UP (ref 2.5–4.5)
PHOSPHATE SERPL-MCNC: 3.9 MG/DL — SIGNIFICANT CHANGE UP (ref 2.5–4.5)
PHOSPHATE SERPL-MCNC: 4.1 MG/DL — SIGNIFICANT CHANGE UP (ref 2.5–4.5)
PHOSPHATE SERPL-MCNC: 4.4 MG/DL — SIGNIFICANT CHANGE UP (ref 2.5–4.5)
PHOSPHATE SERPL-MCNC: 4.5 MG/DL — SIGNIFICANT CHANGE UP (ref 2.5–4.5)
PHOSPHATE SERPL-MCNC: 5 MG/DL — HIGH (ref 2.5–4.5)
PHOSPHATE SERPL-MCNC: 5.3 MG/DL — HIGH (ref 2.5–4.5)
PHOSPHATE SERPL-MCNC: 5.3 MG/DL — HIGH (ref 2.5–4.5)
PLAT MORPH BLD: NORMAL — SIGNIFICANT CHANGE UP
PLATELET # BLD AUTO: 220 K/UL — SIGNIFICANT CHANGE UP (ref 150–400)
PLATELET # BLD AUTO: 230 K/UL — SIGNIFICANT CHANGE UP (ref 150–400)
PLATELET # BLD AUTO: 252 K/UL — SIGNIFICANT CHANGE UP (ref 150–400)
PLATELET # BLD AUTO: 274 K/UL — SIGNIFICANT CHANGE UP (ref 150–400)
PLATELET # BLD AUTO: 283 K/UL — SIGNIFICANT CHANGE UP (ref 150–400)
PLATELET # BLD AUTO: 294 K/UL — SIGNIFICANT CHANGE UP (ref 150–400)
PLATELET # BLD AUTO: 309 K/UL — SIGNIFICANT CHANGE UP (ref 150–400)
PLATELET # BLD AUTO: 310 K/UL — SIGNIFICANT CHANGE UP (ref 150–400)
PLATELET # BLD AUTO: 318 K/UL — SIGNIFICANT CHANGE UP (ref 150–400)
PLATELET # BLD AUTO: 321 K/UL — SIGNIFICANT CHANGE UP (ref 150–400)
PLATELET # BLD AUTO: 323 K/UL — SIGNIFICANT CHANGE UP (ref 150–400)
PLATELET # BLD AUTO: 328 K/UL — SIGNIFICANT CHANGE UP (ref 150–400)
PLATELET # BLD AUTO: 328 K/UL — SIGNIFICANT CHANGE UP (ref 150–400)
PLATELET # BLD AUTO: 343 K/UL — SIGNIFICANT CHANGE UP (ref 150–400)
PLATELET # BLD AUTO: 348 K/UL — SIGNIFICANT CHANGE UP (ref 150–400)
PLATELET # BLD AUTO: 381 K/UL — SIGNIFICANT CHANGE UP (ref 150–400)
PLATELET # BLD AUTO: 395 K/UL — SIGNIFICANT CHANGE UP (ref 150–400)
PLATELET # BLD AUTO: 403 K/UL — HIGH (ref 150–400)
PLATELET # BLD AUTO: 472 K/UL — HIGH (ref 150–400)
PLATELET # BLD AUTO: 506 K/UL — HIGH (ref 150–400)
PLATELET # BLD AUTO: 527 K/UL — HIGH (ref 150–400)
PLATELET # BLD AUTO: 574 K/UL — HIGH (ref 150–400)
PLATELET # BLD AUTO: 622 K/UL — HIGH (ref 150–400)
PLATELET # BLD AUTO: 662 K/UL — HIGH (ref 150–400)
PLATELET # BLD AUTO: 697 K/UL — HIGH (ref 150–400)
PLATELET # BLD AUTO: 715 K/UL — HIGH (ref 150–400)
PLATELET # BLD AUTO: 740 K/UL — HIGH (ref 150–400)
PLATELET # BLD AUTO: 812 K/UL — HIGH (ref 150–400)
PLATELET # BLD AUTO: 847 K/UL — HIGH (ref 150–400)
PLATELET # BLD AUTO: 991 K/UL — HIGH (ref 150–400)
PLATELET COUNT - ESTIMATE: ABNORMAL
PLATELET COUNT - ESTIMATE: NORMAL — SIGNIFICANT CHANGE UP
PO2 BLDA: 103 MMHG — SIGNIFICANT CHANGE UP (ref 74–108)
PO2 BLDA: 107 MMHG — SIGNIFICANT CHANGE UP (ref 74–108)
PO2 BLDA: 111 MMHG — HIGH (ref 74–108)
PO2 BLDA: 209 MMHG — HIGH (ref 74–108)
PO2 BLDA: 45 MMHG — CRITICAL LOW (ref 74–108)
PO2 BLDA: 49 MMHG — CRITICAL LOW (ref 74–108)
PO2 BLDA: 50 MMHG — CRITICAL LOW (ref 74–108)
PO2 BLDA: 52 MMHG — LOW (ref 74–108)
PO2 BLDA: 53 MMHG — LOW (ref 74–108)
PO2 BLDA: 54 MMHG — LOW (ref 74–108)
PO2 BLDA: 56 MMHG — LOW (ref 74–108)
PO2 BLDA: 57 MMHG — LOW (ref 74–108)
PO2 BLDA: 58 MMHG — LOW (ref 74–108)
PO2 BLDA: 59 MMHG — LOW (ref 74–108)
PO2 BLDA: 60 MMHG — LOW (ref 74–108)
PO2 BLDA: 60 MMHG — LOW (ref 74–108)
PO2 BLDA: 64 MMHG — LOW (ref 74–108)
PO2 BLDA: 65 MMHG — LOW (ref 74–108)
PO2 BLDA: 68 MMHG — LOW (ref 74–108)
PO2 BLDA: 69 MMHG — LOW (ref 74–108)
PO2 BLDA: 70 MMHG — LOW (ref 74–108)
PO2 BLDA: 71 MMHG — LOW (ref 74–108)
PO2 BLDA: 71 MMHG — LOW (ref 74–108)
PO2 BLDA: 72 MMHG — LOW (ref 74–108)
PO2 BLDA: 74 MMHG — SIGNIFICANT CHANGE UP (ref 74–108)
PO2 BLDA: 78 MMHG — SIGNIFICANT CHANGE UP (ref 74–108)
PO2 BLDA: 84 MMHG — SIGNIFICANT CHANGE UP (ref 74–108)
PO2 BLDA: 90 MMHG — SIGNIFICANT CHANGE UP (ref 74–108)
PO2 BLDA: 94 MMHG — SIGNIFICANT CHANGE UP (ref 74–108)
PO2 BLDA: 97 MMHG — SIGNIFICANT CHANGE UP (ref 74–108)
POIKILOCYTOSIS BLD QL AUTO: SLIGHT — SIGNIFICANT CHANGE UP
POIKILOCYTOSIS BLD QL AUTO: SLIGHT — SIGNIFICANT CHANGE UP
POLYCHROMASIA BLD QL SMEAR: SIGNIFICANT CHANGE UP
POLYCHROMASIA BLD QL SMEAR: SLIGHT — SIGNIFICANT CHANGE UP
POTASSIUM SERPL-MCNC: 2.7 MMOL/L — CRITICAL LOW (ref 3.5–5.3)
POTASSIUM SERPL-MCNC: 2.9 MMOL/L — CRITICAL LOW (ref 3.5–5.3)
POTASSIUM SERPL-MCNC: 2.9 MMOL/L — CRITICAL LOW (ref 3.5–5.3)
POTASSIUM SERPL-MCNC: 3 MMOL/L — LOW (ref 3.5–5.3)
POTASSIUM SERPL-MCNC: 3.1 MMOL/L — LOW (ref 3.5–5.3)
POTASSIUM SERPL-MCNC: 3.2 MMOL/L — LOW (ref 3.5–5.3)
POTASSIUM SERPL-MCNC: 3.2 MMOL/L — LOW (ref 3.5–5.3)
POTASSIUM SERPL-MCNC: 3.3 MMOL/L — LOW (ref 3.5–5.3)
POTASSIUM SERPL-MCNC: 3.4 MMOL/L — LOW (ref 3.5–5.3)
POTASSIUM SERPL-MCNC: 3.5 MMOL/L — SIGNIFICANT CHANGE UP (ref 3.5–5.3)
POTASSIUM SERPL-MCNC: 3.6 MMOL/L — SIGNIFICANT CHANGE UP (ref 3.5–5.3)
POTASSIUM SERPL-MCNC: 3.7 MMOL/L — SIGNIFICANT CHANGE UP (ref 3.5–5.3)
POTASSIUM SERPL-MCNC: 3.8 MMOL/L — SIGNIFICANT CHANGE UP (ref 3.5–5.3)
POTASSIUM SERPL-MCNC: 3.8 MMOL/L — SIGNIFICANT CHANGE UP (ref 3.5–5.3)
POTASSIUM SERPL-MCNC: 3.9 MMOL/L — SIGNIFICANT CHANGE UP (ref 3.5–5.3)
POTASSIUM SERPL-MCNC: 4 MMOL/L — SIGNIFICANT CHANGE UP (ref 3.5–5.3)
POTASSIUM SERPL-MCNC: 4.2 MMOL/L — SIGNIFICANT CHANGE UP (ref 3.5–5.3)
POTASSIUM SERPL-MCNC: 4.2 MMOL/L — SIGNIFICANT CHANGE UP (ref 3.5–5.3)
POTASSIUM SERPL-MCNC: 4.4 MMOL/L — SIGNIFICANT CHANGE UP (ref 3.5–5.3)
POTASSIUM SERPL-MCNC: 4.5 MMOL/L — SIGNIFICANT CHANGE UP (ref 3.5–5.3)
POTASSIUM SERPL-MCNC: 4.5 MMOL/L — SIGNIFICANT CHANGE UP (ref 3.5–5.3)
POTASSIUM SERPL-MCNC: 5.1 MMOL/L — SIGNIFICANT CHANGE UP (ref 3.5–5.3)
POTASSIUM SERPL-SCNC: 2.7 MMOL/L — CRITICAL LOW (ref 3.5–5.3)
POTASSIUM SERPL-SCNC: 2.9 MMOL/L — CRITICAL LOW (ref 3.5–5.3)
POTASSIUM SERPL-SCNC: 2.9 MMOL/L — CRITICAL LOW (ref 3.5–5.3)
POTASSIUM SERPL-SCNC: 3 MMOL/L — LOW (ref 3.5–5.3)
POTASSIUM SERPL-SCNC: 3.1 MMOL/L — LOW (ref 3.5–5.3)
POTASSIUM SERPL-SCNC: 3.2 MMOL/L — LOW (ref 3.5–5.3)
POTASSIUM SERPL-SCNC: 3.2 MMOL/L — LOW (ref 3.5–5.3)
POTASSIUM SERPL-SCNC: 3.3 MMOL/L — LOW (ref 3.5–5.3)
POTASSIUM SERPL-SCNC: 3.4 MMOL/L — LOW (ref 3.5–5.3)
POTASSIUM SERPL-SCNC: 3.5 MMOL/L — SIGNIFICANT CHANGE UP (ref 3.5–5.3)
POTASSIUM SERPL-SCNC: 3.6 MMOL/L — SIGNIFICANT CHANGE UP (ref 3.5–5.3)
POTASSIUM SERPL-SCNC: 3.7 MMOL/L — SIGNIFICANT CHANGE UP (ref 3.5–5.3)
POTASSIUM SERPL-SCNC: 3.8 MMOL/L — SIGNIFICANT CHANGE UP (ref 3.5–5.3)
POTASSIUM SERPL-SCNC: 3.8 MMOL/L — SIGNIFICANT CHANGE UP (ref 3.5–5.3)
POTASSIUM SERPL-SCNC: 3.9 MMOL/L — SIGNIFICANT CHANGE UP (ref 3.5–5.3)
POTASSIUM SERPL-SCNC: 4 MMOL/L — SIGNIFICANT CHANGE UP (ref 3.5–5.3)
POTASSIUM SERPL-SCNC: 4.2 MMOL/L — SIGNIFICANT CHANGE UP (ref 3.5–5.3)
POTASSIUM SERPL-SCNC: 4.2 MMOL/L — SIGNIFICANT CHANGE UP (ref 3.5–5.3)
POTASSIUM SERPL-SCNC: 4.4 MMOL/L — SIGNIFICANT CHANGE UP (ref 3.5–5.3)
POTASSIUM SERPL-SCNC: 4.5 MMOL/L — SIGNIFICANT CHANGE UP (ref 3.5–5.3)
POTASSIUM SERPL-SCNC: 4.5 MMOL/L — SIGNIFICANT CHANGE UP (ref 3.5–5.3)
POTASSIUM SERPL-SCNC: 5.1 MMOL/L — SIGNIFICANT CHANGE UP (ref 3.5–5.3)
PROCALCITONIN SERPL-MCNC: 0.1 NG/ML — SIGNIFICANT CHANGE UP (ref 0.02–0.1)
PROCALCITONIN SERPL-MCNC: 0.25 NG/ML — HIGH (ref 0.02–0.1)
PROCALCITONIN SERPL-MCNC: 0.35 NG/ML — HIGH (ref 0.02–0.1)
PROCALCITONIN SERPL-MCNC: 0.37 NG/ML — HIGH (ref 0.02–0.1)
PROCALCITONIN SERPL-MCNC: 0.45 NG/ML — HIGH (ref 0.02–0.1)
PROCALCITONIN SERPL-MCNC: 0.5 NG/ML — HIGH (ref 0.02–0.1)
PROCALCITONIN SERPL-MCNC: 0.51 NG/ML — HIGH (ref 0.02–0.1)
PROCALCITONIN SERPL-MCNC: 0.61 NG/ML — HIGH (ref 0.02–0.1)
PROCALCITONIN SERPL-MCNC: 0.62 NG/ML — HIGH (ref 0.02–0.1)
PROCALCITONIN SERPL-MCNC: 0.78 NG/ML — HIGH (ref 0.02–0.1)
PROCALCITONIN SERPL-MCNC: 1.21 NG/ML — HIGH (ref 0.02–0.1)
PROCALCITONIN SERPL-MCNC: 1.26 NG/ML — HIGH (ref 0.02–0.1)
PROCALCITONIN SERPL-MCNC: 16 NG/ML — HIGH (ref 0.02–0.1)
PROCALCITONIN SERPL-MCNC: 16.4 NG/ML — HIGH (ref 0.02–0.1)
PROCALCITONIN SERPL-MCNC: 198.8 NG/ML — HIGH (ref 0.02–0.1)
PROCALCITONIN SERPL-MCNC: 38.4 NG/ML — HIGH (ref 0.02–0.1)
PROCALCITONIN SERPL-MCNC: 4.06 NG/ML — HIGH (ref 0.02–0.1)
PROCALCITONIN SERPL-MCNC: 7.5 NG/ML — HIGH (ref 0.02–0.1)
PROT SERPL-MCNC: 5.4 G/DL — LOW (ref 6–8.3)
PROT SERPL-MCNC: 5.5 G/DL — LOW (ref 6–8.3)
PROT SERPL-MCNC: 5.5 G/DL — LOW (ref 6–8.3)
PROT SERPL-MCNC: 5.6 G/DL — LOW (ref 6–8.3)
PROT SERPL-MCNC: 5.7 G/DL — LOW (ref 6–8.3)
PROT SERPL-MCNC: 5.8 G/DL — LOW (ref 6–8.3)
PROT SERPL-MCNC: 5.8 G/DL — LOW (ref 6–8.3)
PROT SERPL-MCNC: 5.9 G/DL — LOW (ref 6–8.3)
PROT SERPL-MCNC: 5.9 G/DL — LOW (ref 6–8.3)
PROT SERPL-MCNC: 6 G/DL — SIGNIFICANT CHANGE UP (ref 6–8.3)
PROT SERPL-MCNC: 6.1 G/DL — SIGNIFICANT CHANGE UP (ref 6–8.3)
PROT SERPL-MCNC: 6.1 G/DL — SIGNIFICANT CHANGE UP (ref 6–8.3)
PROT SERPL-MCNC: 6.4 G/DL — SIGNIFICANT CHANGE UP (ref 6–8.3)
PROT SERPL-MCNC: 6.6 G/DL — SIGNIFICANT CHANGE UP (ref 6–8.3)
PROT SERPL-MCNC: 6.6 G/DL — SIGNIFICANT CHANGE UP (ref 6–8.3)
PROT SERPL-MCNC: 6.7 G/DL — SIGNIFICANT CHANGE UP (ref 6–8.3)
PROT SERPL-MCNC: 6.9 G/DL — SIGNIFICANT CHANGE UP (ref 6–8.3)
PROT SERPL-MCNC: 7 G/DL — SIGNIFICANT CHANGE UP (ref 6–8.3)
PROT UR-MCNC: 100
PROTHROM AB SERPL-ACNC: 11.9 SEC — SIGNIFICANT CHANGE UP (ref 10–12.9)
PROTHROM AB SERPL-ACNC: 11.9 SEC — SIGNIFICANT CHANGE UP (ref 10–12.9)
PROTHROM AB SERPL-ACNC: 12.5 SEC — SIGNIFICANT CHANGE UP (ref 10–12.9)
PROTHROM AB SERPL-ACNC: 13.5 SEC — HIGH (ref 10–12.9)
PROTHROM AB SERPL-ACNC: 14.1 SEC — HIGH (ref 10–12.9)
PROTHROM AB SERPL-ACNC: 15.7 SEC — HIGH (ref 10–12.9)
RBC # BLD: 2.35 M/UL — LOW (ref 4.2–5.8)
RBC # BLD: 2.52 M/UL — LOW (ref 4.2–5.8)
RBC # BLD: 2.69 M/UL — LOW (ref 4.2–5.8)
RBC # BLD: 2.7 M/UL — LOW (ref 4.2–5.8)
RBC # BLD: 2.71 M/UL — LOW (ref 4.2–5.8)
RBC # BLD: 2.72 M/UL — LOW (ref 4.2–5.8)
RBC # BLD: 2.76 M/UL — LOW (ref 4.2–5.8)
RBC # BLD: 2.78 M/UL — LOW (ref 4.2–5.8)
RBC # BLD: 2.81 M/UL — LOW (ref 4.2–5.8)
RBC # BLD: 2.82 M/UL — LOW (ref 4.2–5.8)
RBC # BLD: 2.85 M/UL — LOW (ref 4.2–5.8)
RBC # BLD: 2.86 M/UL — LOW (ref 4.2–5.8)
RBC # BLD: 2.87 M/UL — LOW (ref 4.2–5.8)
RBC # BLD: 2.91 M/UL — LOW (ref 4.2–5.8)
RBC # BLD: 3.42 M/UL — LOW (ref 4.2–5.8)
RBC # BLD: 3.65 M/UL — LOW (ref 4.2–5.8)
RBC # BLD: 3.71 M/UL — LOW (ref 4.2–5.8)
RBC # BLD: 3.79 M/UL — LOW (ref 4.2–5.8)
RBC # BLD: 3.86 M/UL — LOW (ref 4.2–5.8)
RBC # BLD: 4.05 M/UL — LOW (ref 4.2–5.8)
RBC # BLD: 4.09 M/UL — LOW (ref 4.2–5.8)
RBC # BLD: 4.26 M/UL — SIGNIFICANT CHANGE UP (ref 4.2–5.8)
RBC # BLD: 4.34 M/UL — SIGNIFICANT CHANGE UP (ref 4.2–5.8)
RBC # BLD: 4.34 M/UL — SIGNIFICANT CHANGE UP (ref 4.2–5.8)
RBC # BLD: 4.36 M/UL — SIGNIFICANT CHANGE UP (ref 4.2–5.8)
RBC # BLD: 4.43 M/UL — SIGNIFICANT CHANGE UP (ref 4.2–5.8)
RBC # BLD: 4.65 M/UL — SIGNIFICANT CHANGE UP (ref 4.2–5.8)
RBC # BLD: 4.98 M/UL — SIGNIFICANT CHANGE UP (ref 4.2–5.8)
RBC # BLD: 5.25 M/UL — SIGNIFICANT CHANGE UP (ref 4.2–5.8)
RBC # BLD: 5.5 M/UL — SIGNIFICANT CHANGE UP (ref 4.2–5.8)
RBC # FLD: 12.8 % — SIGNIFICANT CHANGE UP (ref 10.3–14.5)
RBC # FLD: 12.9 % — SIGNIFICANT CHANGE UP (ref 10.3–14.5)
RBC # FLD: 13 % — SIGNIFICANT CHANGE UP (ref 10.3–14.5)
RBC # FLD: 13.1 % — SIGNIFICANT CHANGE UP (ref 10.3–14.5)
RBC # FLD: 13.2 % — SIGNIFICANT CHANGE UP (ref 10.3–14.5)
RBC # FLD: 13.5 % — SIGNIFICANT CHANGE UP (ref 10.3–14.5)
RBC # FLD: 13.6 % — SIGNIFICANT CHANGE UP (ref 10.3–14.5)
RBC # FLD: 13.7 % — SIGNIFICANT CHANGE UP (ref 10.3–14.5)
RBC # FLD: 13.9 % — SIGNIFICANT CHANGE UP (ref 10.3–14.5)
RBC # FLD: 14.1 % — SIGNIFICANT CHANGE UP (ref 10.3–14.5)
RBC # FLD: 14.1 % — SIGNIFICANT CHANGE UP (ref 10.3–14.5)
RBC # FLD: 14.8 % — HIGH (ref 10.3–14.5)
RBC # FLD: 15.1 % — HIGH (ref 10.3–14.5)
RBC # FLD: 15.3 % — HIGH (ref 10.3–14.5)
RBC # FLD: 15.4 % — HIGH (ref 10.3–14.5)
RBC # FLD: 15.4 % — HIGH (ref 10.3–14.5)
RBC # FLD: 15.9 % — HIGH (ref 10.3–14.5)
RBC # FLD: 15.9 % — HIGH (ref 10.3–14.5)
RBC # FLD: 16.4 % — HIGH (ref 10.3–14.5)
RBC # FLD: 16.5 % — HIGH (ref 10.3–14.5)
RBC # FLD: 16.6 % — HIGH (ref 10.3–14.5)
RBC # FLD: 16.7 % — HIGH (ref 10.3–14.5)
RBC # FLD: 17.2 % — HIGH (ref 10.3–14.5)
RBC # FLD: 17.5 % — HIGH (ref 10.3–14.5)
RBC # FLD: 18.1 % — HIGH (ref 10.3–14.5)
RBC # FLD: 18.1 % — HIGH (ref 10.3–14.5)
RBC BLD AUTO: ABNORMAL
RBC BLD AUTO: ABNORMAL
RBC BLD AUTO: NORMAL — SIGNIFICANT CHANGE UP
RBC BLD AUTO: NORMAL — SIGNIFICANT CHANGE UP
RBC CASTS # UR COMP ASSIST: SIGNIFICANT CHANGE UP /HPF (ref 0–2)
S AUREUS DNA NOSE QL NAA+PROBE: DETECTED
SAO2 % BLDA: 74 % — LOW (ref 92–96)
SAO2 % BLDA: 75 % — LOW (ref 92–96)
SAO2 % BLDA: 79 % — LOW (ref 92–96)
SAO2 % BLDA: 82 % — LOW (ref 92–96)
SAO2 % BLDA: 83 % — LOW (ref 92–96)
SAO2 % BLDA: 84 % — LOW (ref 92–96)
SAO2 % BLDA: 85 % — LOW (ref 92–96)
SAO2 % BLDA: 86 % — LOW (ref 92–96)
SAO2 % BLDA: 86 % — LOW (ref 92–96)
SAO2 % BLDA: 88 % — LOW (ref 92–96)
SAO2 % BLDA: 89 % — LOW (ref 92–96)
SAO2 % BLDA: 90 % — LOW (ref 92–96)
SAO2 % BLDA: 90 % — LOW (ref 92–96)
SAO2 % BLDA: 91 % — LOW (ref 92–96)
SAO2 % BLDA: 91 % — LOW (ref 92–96)
SAO2 % BLDA: 92 % — SIGNIFICANT CHANGE UP (ref 92–96)
SAO2 % BLDA: 92.4 % — SIGNIFICANT CHANGE UP (ref 92–96)
SAO2 % BLDA: 93 % — SIGNIFICANT CHANGE UP (ref 92–96)
SAO2 % BLDA: 94 % — SIGNIFICANT CHANGE UP (ref 92–96)
SAO2 % BLDA: 95 % — SIGNIFICANT CHANGE UP (ref 92–96)
SAO2 % BLDA: 97 % — HIGH (ref 92–96)
SAO2 % BLDA: 98 % — HIGH (ref 92–96)
SARS-COV-2 RNA SPEC QL NAA+PROBE: DETECTED
SARS-COV-2 RNA SPEC QL NAA+PROBE: SIGNIFICANT CHANGE UP
SODIUM SERPL-SCNC: 133 MMOL/L — LOW (ref 135–145)
SODIUM SERPL-SCNC: 135 MMOL/L — SIGNIFICANT CHANGE UP (ref 135–145)
SODIUM SERPL-SCNC: 136 MMOL/L — SIGNIFICANT CHANGE UP (ref 135–145)
SODIUM SERPL-SCNC: 137 MMOL/L — SIGNIFICANT CHANGE UP (ref 135–145)
SODIUM SERPL-SCNC: 137 MMOL/L — SIGNIFICANT CHANGE UP (ref 135–145)
SODIUM SERPL-SCNC: 138 MMOL/L — SIGNIFICANT CHANGE UP (ref 135–145)
SODIUM SERPL-SCNC: 139 MMOL/L — SIGNIFICANT CHANGE UP (ref 135–145)
SODIUM SERPL-SCNC: 140 MMOL/L — SIGNIFICANT CHANGE UP (ref 135–145)
SODIUM SERPL-SCNC: 141 MMOL/L — SIGNIFICANT CHANGE UP (ref 135–145)
SODIUM SERPL-SCNC: 142 MMOL/L — SIGNIFICANT CHANGE UP (ref 135–145)
SODIUM SERPL-SCNC: 145 MMOL/L — SIGNIFICANT CHANGE UP (ref 135–145)
SODIUM SERPL-SCNC: 146 MMOL/L — HIGH (ref 135–145)
SODIUM SERPL-SCNC: 146 MMOL/L — HIGH (ref 135–145)
SODIUM SERPL-SCNC: 147 MMOL/L — HIGH (ref 135–145)
SODIUM SERPL-SCNC: 149 MMOL/L — HIGH (ref 135–145)
SODIUM SERPL-SCNC: 149 MMOL/L — HIGH (ref 135–145)
SODIUM SERPL-SCNC: 151 MMOL/L — HIGH (ref 135–145)
SODIUM SERPL-SCNC: 151 MMOL/L — HIGH (ref 135–145)
SODIUM SERPL-SCNC: 152 MMOL/L — HIGH (ref 135–145)
SODIUM SERPL-SCNC: 153 MMOL/L — HIGH (ref 135–145)
SP GR SPEC: 1.02 — SIGNIFICANT CHANGE UP (ref 1.01–1.02)
SPECIMEN SOURCE: SIGNIFICANT CHANGE UP
TOTAL CHOLESTEROL/HDL RATIO MEASUREMENT: 5.3 RATIO — SIGNIFICANT CHANGE UP (ref 3.4–9.6)
TRIGL SERPL-MCNC: 130 MG/DL — SIGNIFICANT CHANGE UP (ref 10–149)
TRIGL SERPL-MCNC: 256 MG/DL — HIGH (ref 10–149)
TROPONIN I SERPL-MCNC: 0.09 NG/ML — HIGH (ref 0–0.04)
TROPONIN I SERPL-MCNC: 0.12 NG/ML — HIGH (ref 0–0.04)
TROPONIN I SERPL-MCNC: 0.14 NG/ML — HIGH (ref 0–0.04)
TSH SERPL-MCNC: 1.58 UU/ML — SIGNIFICANT CHANGE UP (ref 0.34–4.82)
UROBILINOGEN FLD QL: 1
VANCOMYCIN TROUGH SERPL-MCNC: 14.9 UG/ML — SIGNIFICANT CHANGE UP (ref 10–20)
VANCOMYCIN TROUGH SERPL-MCNC: 16.1 UG/ML — SIGNIFICANT CHANGE UP (ref 10–20)
VANCOMYCIN TROUGH SERPL-MCNC: 18.7 UG/ML — SIGNIFICANT CHANGE UP (ref 10–20)
VANCOMYCIN TROUGH SERPL-MCNC: 39.1 UG/ML — CRITICAL HIGH (ref 10–20)
VANCOMYCIN TROUGH SERPL-MCNC: 44.6 UG/ML — CRITICAL HIGH (ref 10–20)
VARIANT LYMPHS # BLD: 1 % — SIGNIFICANT CHANGE UP (ref 0–6)
VIT B12 SERPL-MCNC: 553 PG/ML — SIGNIFICANT CHANGE UP (ref 232–1245)
WBC # BLD: 10.83 K/UL — HIGH (ref 3.8–10.5)
WBC # BLD: 11.35 K/UL — HIGH (ref 3.8–10.5)
WBC # BLD: 17.3 K/UL — HIGH (ref 3.8–10.5)
WBC # BLD: 17.31 K/UL — HIGH (ref 3.8–10.5)
WBC # BLD: 17.93 K/UL — HIGH (ref 3.8–10.5)
WBC # BLD: 18.79 K/UL — HIGH (ref 3.8–10.5)
WBC # BLD: 18.83 K/UL — HIGH (ref 3.8–10.5)
WBC # BLD: 19.52 K/UL — HIGH (ref 3.8–10.5)
WBC # BLD: 20.2 K/UL — HIGH (ref 3.8–10.5)
WBC # BLD: 20.22 K/UL — HIGH (ref 3.8–10.5)
WBC # BLD: 20.37 K/UL — HIGH (ref 3.8–10.5)
WBC # BLD: 20.45 K/UL — HIGH (ref 3.8–10.5)
WBC # BLD: 21.02 K/UL — HIGH (ref 3.8–10.5)
WBC # BLD: 21.15 K/UL — HIGH (ref 3.8–10.5)
WBC # BLD: 22.43 K/UL — HIGH (ref 3.8–10.5)
WBC # BLD: 22.74 K/UL — HIGH (ref 3.8–10.5)
WBC # BLD: 22.94 K/UL — HIGH (ref 3.8–10.5)
WBC # BLD: 23.48 K/UL — HIGH (ref 3.8–10.5)
WBC # BLD: 24.46 K/UL — HIGH (ref 3.8–10.5)
WBC # BLD: 24.63 K/UL — HIGH (ref 3.8–10.5)
WBC # BLD: 26.48 K/UL — HIGH (ref 3.8–10.5)
WBC # BLD: 26.85 K/UL — HIGH (ref 3.8–10.5)
WBC # BLD: 28.59 K/UL — HIGH (ref 3.8–10.5)
WBC # BLD: 29.44 K/UL — HIGH (ref 3.8–10.5)
WBC # BLD: 31.08 K/UL — HIGH (ref 3.8–10.5)
WBC # BLD: 33.21 K/UL — HIGH (ref 3.8–10.5)
WBC # BLD: 36.86 K/UL — HIGH (ref 3.8–10.5)
WBC # BLD: 6.61 K/UL — SIGNIFICANT CHANGE UP (ref 3.8–10.5)
WBC # BLD: 8.79 K/UL — SIGNIFICANT CHANGE UP (ref 3.8–10.5)
WBC # BLD: 9.89 K/UL — SIGNIFICANT CHANGE UP (ref 3.8–10.5)
WBC # FLD AUTO: 10.83 K/UL — HIGH (ref 3.8–10.5)
WBC # FLD AUTO: 11.35 K/UL — HIGH (ref 3.8–10.5)
WBC # FLD AUTO: 17.3 K/UL — HIGH (ref 3.8–10.5)
WBC # FLD AUTO: 17.31 K/UL — HIGH (ref 3.8–10.5)
WBC # FLD AUTO: 17.93 K/UL — HIGH (ref 3.8–10.5)
WBC # FLD AUTO: 18.79 K/UL — HIGH (ref 3.8–10.5)
WBC # FLD AUTO: 18.83 K/UL — HIGH (ref 3.8–10.5)
WBC # FLD AUTO: 19.52 K/UL — HIGH (ref 3.8–10.5)
WBC # FLD AUTO: 20.2 K/UL — HIGH (ref 3.8–10.5)
WBC # FLD AUTO: 20.22 K/UL — HIGH (ref 3.8–10.5)
WBC # FLD AUTO: 20.37 K/UL — HIGH (ref 3.8–10.5)
WBC # FLD AUTO: 20.45 K/UL — HIGH (ref 3.8–10.5)
WBC # FLD AUTO: 21.02 K/UL — HIGH (ref 3.8–10.5)
WBC # FLD AUTO: 21.15 K/UL — HIGH (ref 3.8–10.5)
WBC # FLD AUTO: 22.43 K/UL — HIGH (ref 3.8–10.5)
WBC # FLD AUTO: 22.74 K/UL — HIGH (ref 3.8–10.5)
WBC # FLD AUTO: 22.94 K/UL — HIGH (ref 3.8–10.5)
WBC # FLD AUTO: 23.48 K/UL — HIGH (ref 3.8–10.5)
WBC # FLD AUTO: 24.46 K/UL — HIGH (ref 3.8–10.5)
WBC # FLD AUTO: 24.63 K/UL — HIGH (ref 3.8–10.5)
WBC # FLD AUTO: 26.48 K/UL — HIGH (ref 3.8–10.5)
WBC # FLD AUTO: 26.85 K/UL — HIGH (ref 3.8–10.5)
WBC # FLD AUTO: 28.59 K/UL — HIGH (ref 3.8–10.5)
WBC # FLD AUTO: 29.44 K/UL — HIGH (ref 3.8–10.5)
WBC # FLD AUTO: 31.08 K/UL — HIGH (ref 3.8–10.5)
WBC # FLD AUTO: 33.21 K/UL — HIGH (ref 3.8–10.5)
WBC # FLD AUTO: 36.86 K/UL — HIGH (ref 3.8–10.5)
WBC # FLD AUTO: 6.61 K/UL — SIGNIFICANT CHANGE UP (ref 3.8–10.5)
WBC # FLD AUTO: 8.79 K/UL — SIGNIFICANT CHANGE UP (ref 3.8–10.5)
WBC # FLD AUTO: 9.89 K/UL — SIGNIFICANT CHANGE UP (ref 3.8–10.5)
WBC UR QL: SIGNIFICANT CHANGE UP /HPF (ref 0–5)

## 2020-01-01 PROCEDURE — 87635 SARS-COV-2 COVID-19 AMP PRB: CPT

## 2020-01-01 PROCEDURE — 99291 CRITICAL CARE FIRST HOUR: CPT

## 2020-01-01 PROCEDURE — 71045 X-RAY EXAM CHEST 1 VIEW: CPT | Mod: 26

## 2020-01-01 PROCEDURE — 93005 ELECTROCARDIOGRAM TRACING: CPT

## 2020-01-01 PROCEDURE — 99285 EMERGENCY DEPT VISIT HI MDM: CPT

## 2020-01-01 PROCEDURE — 71045 X-RAY EXAM CHEST 1 VIEW: CPT | Mod: 26,77

## 2020-01-01 PROCEDURE — 99232 SBSQ HOSP IP/OBS MODERATE 35: CPT | Mod: 57

## 2020-01-01 PROCEDURE — 99283 EMERGENCY DEPT VISIT LOW MDM: CPT | Mod: 25

## 2020-01-01 PROCEDURE — 99283 EMERGENCY DEPT VISIT LOW MDM: CPT

## 2020-01-01 PROCEDURE — 99233 SBSQ HOSP IP/OBS HIGH 50: CPT

## 2020-01-01 PROCEDURE — 32551 INSERTION OF CHEST TUBE: CPT

## 2020-01-01 PROCEDURE — 74018 RADEX ABDOMEN 1 VIEW: CPT | Mod: 26

## 2020-01-01 PROCEDURE — 94640 AIRWAY INHALATION TREATMENT: CPT

## 2020-01-01 RX ORDER — HYDROXYCHLOROQUINE SULFATE 200 MG
400 TABLET ORAL EVERY 24 HOURS
Refills: 0 | Status: COMPLETED | OUTPATIENT
Start: 2020-01-01 | End: 2020-01-01

## 2020-01-01 RX ORDER — PHENYLEPHRINE HYDROCHLORIDE 10 MG/ML
0.2 INJECTION INTRAVENOUS
Qty: 160 | Refills: 0 | Status: DISCONTINUED | OUTPATIENT
Start: 2020-01-01 | End: 2020-01-01

## 2020-01-01 RX ORDER — ROCURONIUM BROMIDE 10 MG/ML
50 VIAL (ML) INTRAVENOUS ONCE
Refills: 0 | Status: COMPLETED | OUTPATIENT
Start: 2020-01-01 | End: 2020-01-01

## 2020-01-01 RX ORDER — POTASSIUM CHLORIDE 20 MEQ
20 PACKET (EA) ORAL ONCE
Refills: 0 | Status: COMPLETED | OUTPATIENT
Start: 2020-01-01 | End: 2020-01-01

## 2020-01-01 RX ORDER — METRONIDAZOLE 500 MG
TABLET ORAL
Refills: 0 | Status: DISCONTINUED | OUTPATIENT
Start: 2020-01-01 | End: 2020-01-01

## 2020-01-01 RX ORDER — ENOXAPARIN SODIUM 100 MG/ML
70 INJECTION SUBCUTANEOUS EVERY 12 HOURS
Refills: 0 | Status: DISCONTINUED | OUTPATIENT
Start: 2020-01-01 | End: 2020-01-01

## 2020-01-01 RX ORDER — TOCILIZUMAB 20 MG/ML
400 INJECTION, SOLUTION, CONCENTRATE INTRAVENOUS ONCE
Refills: 0 | Status: COMPLETED | OUTPATIENT
Start: 2020-01-01 | End: 2020-01-01

## 2020-01-01 RX ORDER — POTASSIUM PHOSPHATE, MONOBASIC POTASSIUM PHOSPHATE, DIBASIC 236; 224 MG/ML; MG/ML
30 INJECTION, SOLUTION INTRAVENOUS ONCE
Refills: 0 | Status: COMPLETED | OUTPATIENT
Start: 2020-01-01 | End: 2020-01-01

## 2020-01-01 RX ORDER — ALBUMIN HUMAN 25 %
100 VIAL (ML) INTRAVENOUS ONCE
Refills: 0 | Status: COMPLETED | OUTPATIENT
Start: 2020-01-01 | End: 2020-01-01

## 2020-01-01 RX ORDER — METOPROLOL TARTRATE 50 MG
5 TABLET ORAL ONCE
Refills: 0 | Status: COMPLETED | OUTPATIENT
Start: 2020-01-01 | End: 2020-01-01

## 2020-01-01 RX ORDER — SODIUM CHLORIDE 9 MG/ML
10 INJECTION INTRAMUSCULAR; INTRAVENOUS; SUBCUTANEOUS
Refills: 0 | Status: DISCONTINUED | OUTPATIENT
Start: 2020-01-01 | End: 2020-01-01

## 2020-01-01 RX ORDER — VANCOMYCIN HCL 1 G
1000 VIAL (EA) INTRAVENOUS EVERY 12 HOURS
Refills: 0 | Status: DISCONTINUED | OUTPATIENT
Start: 2020-01-01 | End: 2020-01-01

## 2020-01-01 RX ORDER — MIDAZOLAM HYDROCHLORIDE 1 MG/ML
2 INJECTION, SOLUTION INTRAMUSCULAR; INTRAVENOUS ONCE
Refills: 0 | Status: DISCONTINUED | OUTPATIENT
Start: 2020-01-01 | End: 2020-01-01

## 2020-01-01 RX ORDER — ACETAMINOPHEN 500 MG
1000 TABLET ORAL ONCE
Refills: 0 | Status: COMPLETED | OUTPATIENT
Start: 2020-01-01 | End: 2020-01-01

## 2020-01-01 RX ORDER — POTASSIUM CHLORIDE 20 MEQ
10 PACKET (EA) ORAL
Refills: 0 | Status: COMPLETED | OUTPATIENT
Start: 2020-01-01 | End: 2020-01-01

## 2020-01-01 RX ORDER — POTASSIUM CHLORIDE 20 MEQ
40 PACKET (EA) ORAL ONCE
Refills: 0 | Status: COMPLETED | OUTPATIENT
Start: 2020-01-01 | End: 2020-01-01

## 2020-01-01 RX ORDER — POTASSIUM CHLORIDE 20 MEQ
40 PACKET (EA) ORAL EVERY 4 HOURS
Refills: 0 | Status: COMPLETED | OUTPATIENT
Start: 2020-01-01 | End: 2020-01-01

## 2020-01-01 RX ORDER — ROBINUL 0.2 MG/ML
0.4 INJECTION INTRAMUSCULAR; INTRAVENOUS EVERY 4 HOURS
Refills: 0 | Status: DISCONTINUED | OUTPATIENT
Start: 2020-01-01 | End: 2020-01-01

## 2020-01-01 RX ORDER — HYDROXYCHLOROQUINE SULFATE 200 MG
800 TABLET ORAL EVERY 24 HOURS
Refills: 0 | Status: COMPLETED | OUTPATIENT
Start: 2020-01-01 | End: 2020-01-01

## 2020-01-01 RX ORDER — FENTANYL CITRATE 50 UG/ML
1 INJECTION INTRAVENOUS
Refills: 0 | Status: DISCONTINUED | OUTPATIENT
Start: 2020-01-01 | End: 2020-01-01

## 2020-01-01 RX ORDER — HYDROMORPHONE HYDROCHLORIDE 2 MG/ML
4 INJECTION INTRAMUSCULAR; INTRAVENOUS; SUBCUTANEOUS
Qty: 100 | Refills: 0 | Status: DISCONTINUED | OUTPATIENT
Start: 2020-01-01 | End: 2020-01-01

## 2020-01-01 RX ORDER — METRONIDAZOLE 500 MG
500 TABLET ORAL ONCE
Refills: 0 | Status: COMPLETED | OUTPATIENT
Start: 2020-01-01 | End: 2020-01-01

## 2020-01-01 RX ORDER — POTASSIUM PHOSPHATE, MONOBASIC POTASSIUM PHOSPHATE, DIBASIC 236; 224 MG/ML; MG/ML
15 INJECTION, SOLUTION INTRAVENOUS ONCE
Refills: 0 | Status: COMPLETED | OUTPATIENT
Start: 2020-01-01 | End: 2020-01-01

## 2020-01-01 RX ORDER — PANTOPRAZOLE SODIUM 20 MG/1
40 TABLET, DELAYED RELEASE ORAL DAILY
Refills: 0 | Status: DISCONTINUED | OUTPATIENT
Start: 2020-01-01 | End: 2020-01-01

## 2020-01-01 RX ORDER — MUPIROCIN 20 MG/G
1 OINTMENT TOPICAL
Refills: 0 | Status: COMPLETED | OUTPATIENT
Start: 2020-01-01 | End: 2020-01-01

## 2020-01-01 RX ORDER — MIDAZOLAM HYDROCHLORIDE 1 MG/ML
0.02 INJECTION, SOLUTION INTRAMUSCULAR; INTRAVENOUS
Qty: 100 | Refills: 0 | Status: DISCONTINUED | OUTPATIENT
Start: 2020-01-01 | End: 2020-01-01

## 2020-01-01 RX ORDER — ROCURONIUM BROMIDE 10 MG/ML
8 VIAL (ML) INTRAVENOUS
Qty: 500 | Refills: 0 | Status: DISCONTINUED | OUTPATIENT
Start: 2020-01-01 | End: 2020-01-01

## 2020-01-01 RX ORDER — FUROSEMIDE 40 MG
40 TABLET ORAL ONCE
Refills: 0 | Status: COMPLETED | OUTPATIENT
Start: 2020-01-01 | End: 2020-01-01

## 2020-01-01 RX ORDER — MUPIROCIN 20 MG/G
1 OINTMENT TOPICAL
Refills: 0 | Status: DISCONTINUED | OUTPATIENT
Start: 2020-01-01 | End: 2020-01-01

## 2020-01-01 RX ORDER — SENNA PLUS 8.6 MG/1
1 TABLET ORAL
Refills: 0 | Status: DISCONTINUED | OUTPATIENT
Start: 2020-01-01 | End: 2020-01-01

## 2020-01-01 RX ORDER — FENTANYL CITRATE 50 UG/ML
25 INJECTION INTRAVENOUS EVERY 4 HOURS
Refills: 0 | Status: DISCONTINUED | OUTPATIENT
Start: 2020-01-01 | End: 2020-01-01

## 2020-01-01 RX ORDER — CHLORHEXIDINE GLUCONATE 213 G/1000ML
15 SOLUTION TOPICAL EVERY 12 HOURS
Refills: 0 | Status: DISCONTINUED | OUTPATIENT
Start: 2020-01-01 | End: 2020-01-01

## 2020-01-01 RX ORDER — HYDROMORPHONE HYDROCHLORIDE 2 MG/ML
2 INJECTION INTRAMUSCULAR; INTRAVENOUS; SUBCUTANEOUS
Qty: 100 | Refills: 0 | Status: DISCONTINUED | OUTPATIENT
Start: 2020-01-01 | End: 2020-01-01

## 2020-01-01 RX ORDER — POTASSIUM CHLORIDE 20 MEQ
10 PACKET (EA) ORAL ONCE
Refills: 0 | Status: DISCONTINUED | OUTPATIENT
Start: 2020-01-01 | End: 2020-01-01

## 2020-01-01 RX ORDER — FUROSEMIDE 40 MG
20 TABLET ORAL ONCE
Refills: 0 | Status: COMPLETED | OUTPATIENT
Start: 2020-01-01 | End: 2020-01-01

## 2020-01-01 RX ORDER — POTASSIUM CHLORIDE 20 MEQ
40 PACKET (EA) ORAL
Refills: 0 | Status: COMPLETED | OUTPATIENT
Start: 2020-01-01 | End: 2020-01-01

## 2020-01-01 RX ORDER — VANCOMYCIN HCL 1 G
1000 VIAL (EA) INTRAVENOUS ONCE
Refills: 0 | Status: COMPLETED | OUTPATIENT
Start: 2020-01-01 | End: 2020-01-01

## 2020-01-01 RX ORDER — PROPOFOL 10 MG/ML
10 INJECTION, EMULSION INTRAVENOUS
Qty: 1000 | Refills: 0 | Status: DISCONTINUED | OUTPATIENT
Start: 2020-01-01 | End: 2020-01-01

## 2020-01-01 RX ORDER — LACTULOSE 10 G/15ML
10 SOLUTION ORAL EVERY 12 HOURS
Refills: 0 | Status: DISCONTINUED | OUTPATIENT
Start: 2020-01-01 | End: 2020-01-01

## 2020-01-01 RX ORDER — DEXMEDETOMIDINE HYDROCHLORIDE IN 0.9% SODIUM CHLORIDE 4 UG/ML
0.2 INJECTION INTRAVENOUS
Qty: 400 | Refills: 0 | Status: DISCONTINUED | OUTPATIENT
Start: 2020-01-01 | End: 2020-01-01

## 2020-01-01 RX ORDER — SODIUM CHLORIDE 9 MG/ML
1000 INJECTION INTRAMUSCULAR; INTRAVENOUS; SUBCUTANEOUS ONCE
Refills: 0 | Status: COMPLETED | OUTPATIENT
Start: 2020-01-01 | End: 2020-01-01

## 2020-01-01 RX ORDER — ENOXAPARIN SODIUM 100 MG/ML
40 INJECTION SUBCUTANEOUS DAILY
Refills: 0 | Status: DISCONTINUED | OUTPATIENT
Start: 2020-01-01 | End: 2020-01-01

## 2020-01-01 RX ORDER — DEXTROSE MONOHYDRATE, SODIUM CHLORIDE, AND POTASSIUM CHLORIDE 50; .745; 4.5 G/1000ML; G/1000ML; G/1000ML
1000 INJECTION, SOLUTION INTRAVENOUS
Refills: 0 | Status: DISCONTINUED | OUTPATIENT
Start: 2020-01-01 | End: 2020-01-01

## 2020-01-01 RX ORDER — HYDROXYCHLOROQUINE SULFATE 200 MG
TABLET ORAL
Refills: 0 | Status: COMPLETED | OUTPATIENT
Start: 2020-01-01 | End: 2020-01-01

## 2020-01-01 RX ORDER — ACETAMINOPHEN 500 MG
650 TABLET ORAL ONCE
Refills: 0 | Status: COMPLETED | OUTPATIENT
Start: 2020-01-01 | End: 2020-01-01

## 2020-01-01 RX ORDER — SODIUM CHLORIDE 9 MG/ML
500 INJECTION, SOLUTION INTRAVENOUS ONCE
Refills: 0 | Status: COMPLETED | OUTPATIENT
Start: 2020-01-01 | End: 2020-01-01

## 2020-01-01 RX ORDER — ACETAMINOPHEN 500 MG
650 TABLET ORAL EVERY 6 HOURS
Refills: 0 | Status: DISCONTINUED | OUTPATIENT
Start: 2020-01-01 | End: 2020-01-01

## 2020-01-01 RX ORDER — METRONIDAZOLE 500 MG
500 TABLET ORAL EVERY 8 HOURS
Refills: 0 | Status: DISCONTINUED | OUTPATIENT
Start: 2020-01-01 | End: 2020-01-01

## 2020-01-01 RX ORDER — CEFEPIME 1 G/1
2000 INJECTION, POWDER, FOR SOLUTION INTRAMUSCULAR; INTRAVENOUS EVERY 8 HOURS
Refills: 0 | Status: DISCONTINUED | OUTPATIENT
Start: 2020-01-01 | End: 2020-01-01

## 2020-01-01 RX ORDER — SUCCINYLCHOLINE CHLORIDE 100 MG/5ML
100 SYRINGE (ML) INTRAVENOUS ONCE
Refills: 0 | Status: COMPLETED | OUTPATIENT
Start: 2020-01-01 | End: 2020-01-01

## 2020-01-01 RX ORDER — DIPHENHYDRAMINE HCL 50 MG
50 CAPSULE ORAL ONCE
Refills: 0 | Status: COMPLETED | OUTPATIENT
Start: 2020-01-01 | End: 2020-01-01

## 2020-01-01 RX ORDER — PROPOFOL 10 MG/ML
40 INJECTION, EMULSION INTRAVENOUS
Qty: 1000 | Refills: 0 | Status: DISCONTINUED | OUTPATIENT
Start: 2020-01-01 | End: 2020-01-01

## 2020-01-01 RX ORDER — MIDAZOLAM HYDROCHLORIDE 1 MG/ML
2 INJECTION, SOLUTION INTRAMUSCULAR; INTRAVENOUS EVERY 4 HOURS
Refills: 0 | Status: DISCONTINUED | OUTPATIENT
Start: 2020-01-01 | End: 2020-01-01

## 2020-01-01 RX ORDER — ALBUTEROL 90 UG/1
2 AEROSOL, METERED ORAL ONCE
Refills: 0 | Status: COMPLETED | OUTPATIENT
Start: 2020-01-01 | End: 2020-01-01

## 2020-01-01 RX ORDER — SODIUM CHLORIDE 9 MG/ML
500 INJECTION INTRAMUSCULAR; INTRAVENOUS; SUBCUTANEOUS ONCE
Refills: 0 | Status: COMPLETED | OUTPATIENT
Start: 2020-01-01 | End: 2020-01-01

## 2020-01-01 RX ORDER — MIDAZOLAM HYDROCHLORIDE 1 MG/ML
4 INJECTION, SOLUTION INTRAMUSCULAR; INTRAVENOUS ONCE
Refills: 0 | Status: DISCONTINUED | OUTPATIENT
Start: 2020-01-01 | End: 2020-01-01

## 2020-01-01 RX ORDER — ACETAMINOPHEN 500 MG
1000 TABLET ORAL ONCE
Refills: 0 | Status: DISCONTINUED | OUTPATIENT
Start: 2020-01-01 | End: 2020-01-01

## 2020-01-01 RX ORDER — VASOPRESSIN 20 [USP'U]/ML
0.04 INJECTION INTRAVENOUS
Qty: 50 | Refills: 0 | Status: DISCONTINUED | OUTPATIENT
Start: 2020-01-01 | End: 2020-01-01

## 2020-01-01 RX ORDER — CEFAZOLIN SODIUM 1 G
1000 VIAL (EA) INJECTION ONCE
Refills: 0 | Status: COMPLETED | OUTPATIENT
Start: 2020-01-01 | End: 2020-01-01

## 2020-01-01 RX ORDER — TOCILIZUMAB 20 MG/ML
400 INJECTION, SOLUTION, CONCENTRATE INTRAVENOUS ONCE
Refills: 0 | Status: DISCONTINUED | OUTPATIENT
Start: 2020-01-01 | End: 2020-01-01

## 2020-01-01 RX ORDER — DEXMEDETOMIDINE HYDROCHLORIDE IN 0.9% SODIUM CHLORIDE 4 UG/ML
0.2 INJECTION INTRAVENOUS
Qty: 200 | Refills: 0 | Status: DISCONTINUED | OUTPATIENT
Start: 2020-01-01 | End: 2020-01-01

## 2020-01-01 RX ORDER — METOPROLOL TARTRATE 50 MG
12.5 TABLET ORAL
Refills: 0 | Status: DISCONTINUED | OUTPATIENT
Start: 2020-01-01 | End: 2020-01-01

## 2020-01-01 RX ORDER — POTASSIUM CHLORIDE 20 MEQ
10 PACKET (EA) ORAL ONCE
Refills: 0 | Status: COMPLETED | OUTPATIENT
Start: 2020-01-01 | End: 2020-01-01

## 2020-01-01 RX ORDER — CISATRACURIUM BESYLATE 2 MG/ML
3 INJECTION INTRAVENOUS
Qty: 200 | Refills: 0 | Status: DISCONTINUED | OUTPATIENT
Start: 2020-01-01 | End: 2020-01-01

## 2020-01-01 RX ORDER — PROPOFOL 10 MG/ML
100 INJECTION, EMULSION INTRAVENOUS ONCE
Refills: 0 | Status: COMPLETED | OUTPATIENT
Start: 2020-01-01 | End: 2020-01-01

## 2020-01-01 RX ORDER — NOREPINEPHRINE BITARTRATE/D5W 8 MG/250ML
0.05 PLASTIC BAG, INJECTION (ML) INTRAVENOUS
Qty: 16 | Refills: 0 | Status: DISCONTINUED | OUTPATIENT
Start: 2020-01-01 | End: 2020-01-01

## 2020-01-01 RX ORDER — FENTANYL CITRATE 50 UG/ML
0.5 INJECTION INTRAVENOUS
Qty: 2500 | Refills: 0 | Status: DISCONTINUED | OUTPATIENT
Start: 2020-01-01 | End: 2020-01-01

## 2020-01-01 RX ORDER — PHENYLEPHRINE HYDROCHLORIDE 10 MG/ML
0.1 INJECTION INTRAVENOUS
Qty: 160 | Refills: 0 | Status: DISCONTINUED | OUTPATIENT
Start: 2020-01-01 | End: 2020-01-01

## 2020-01-01 RX ORDER — CEFAZOLIN SODIUM 1 G
VIAL (EA) INJECTION
Refills: 0 | Status: DISCONTINUED | OUTPATIENT
Start: 2020-01-01 | End: 2020-01-01

## 2020-01-01 RX ORDER — ALBUMIN HUMAN 25 %
250 VIAL (ML) INTRAVENOUS ONCE
Refills: 0 | Status: DISCONTINUED | OUTPATIENT
Start: 2020-01-01 | End: 2020-01-01

## 2020-01-01 RX ORDER — FENTANYL CITRATE 50 UG/ML
1 INJECTION INTRAVENOUS
Qty: 2500 | Refills: 0 | Status: DISCONTINUED | OUTPATIENT
Start: 2020-01-01 | End: 2020-01-01

## 2020-01-01 RX ORDER — POLYETHYLENE GLYCOL 3350 17 G/17G
17 POWDER, FOR SOLUTION ORAL ONCE
Refills: 0 | Status: COMPLETED | OUTPATIENT
Start: 2020-01-01 | End: 2020-01-01

## 2020-01-01 RX ORDER — CEFAZOLIN SODIUM 1 G
1000 VIAL (EA) INJECTION EVERY 8 HOURS
Refills: 0 | Status: DISCONTINUED | OUTPATIENT
Start: 2020-01-01 | End: 2020-01-01

## 2020-01-01 RX ORDER — HYDROMORPHONE HYDROCHLORIDE 2 MG/ML
1 INJECTION INTRAMUSCULAR; INTRAVENOUS; SUBCUTANEOUS
Qty: 100 | Refills: 0 | Status: DISCONTINUED | OUTPATIENT
Start: 2020-01-01 | End: 2020-01-01

## 2020-01-01 RX ORDER — BENZOCAINE AND MENTHOL 5; 1 G/100ML; G/100ML
1 LIQUID ORAL EVERY 4 HOURS
Refills: 0 | Status: DISCONTINUED | OUTPATIENT
Start: 2020-01-01 | End: 2020-01-01

## 2020-01-01 RX ORDER — CHLORHEXIDINE GLUCONATE 213 G/1000ML
1 SOLUTION TOPICAL
Refills: 0 | Status: DISCONTINUED | OUTPATIENT
Start: 2020-01-01 | End: 2020-01-01

## 2020-01-01 RX ADMIN — PHENYLEPHRINE HYDROCHLORIDE 1.33 MICROGRAM(S)/KG/MIN: 10 INJECTION INTRAVENOUS at 14:09

## 2020-01-01 RX ADMIN — CEFEPIME 100 MILLIGRAM(S): 1 INJECTION, POWDER, FOR SOLUTION INTRAMUSCULAR; INTRAVENOUS at 05:44

## 2020-01-01 RX ADMIN — ENOXAPARIN SODIUM 70 MILLIGRAM(S): 100 INJECTION SUBCUTANEOUS at 05:19

## 2020-01-01 RX ADMIN — ENOXAPARIN SODIUM 70 MILLIGRAM(S): 100 INJECTION SUBCUTANEOUS at 05:28

## 2020-01-01 RX ADMIN — MIDAZOLAM HYDROCHLORIDE 1.42 MG/KG/HR: 1 INJECTION, SOLUTION INTRAMUSCULAR; INTRAVENOUS at 22:51

## 2020-01-01 RX ADMIN — PROPOFOL 4.25 MICROGRAM(S)/KG/MIN: 10 INJECTION, EMULSION INTRAVENOUS at 23:46

## 2020-01-01 RX ADMIN — CEFEPIME 100 MILLIGRAM(S): 1 INJECTION, POWDER, FOR SOLUTION INTRAMUSCULAR; INTRAVENOUS at 13:15

## 2020-01-01 RX ADMIN — PHENYLEPHRINE HYDROCHLORIDE 2.66 MICROGRAM(S)/KG/MIN: 10 INJECTION INTRAVENOUS at 09:49

## 2020-01-01 RX ADMIN — Medication 250 MILLIGRAM(S): at 08:39

## 2020-01-01 RX ADMIN — Medication 650 MILLIGRAM(S): at 21:16

## 2020-01-01 RX ADMIN — Medication 800 MILLIGRAM(S): at 00:29

## 2020-01-01 RX ADMIN — ENOXAPARIN SODIUM 70 MILLIGRAM(S): 100 INJECTION SUBCUTANEOUS at 10:00

## 2020-01-01 RX ADMIN — CHLORHEXIDINE GLUCONATE 1 APPLICATION(S): 213 SOLUTION TOPICAL at 05:44

## 2020-01-01 RX ADMIN — SENNA PLUS 1 TABLET(S): 8.6 TABLET ORAL at 05:28

## 2020-01-01 RX ADMIN — ENOXAPARIN SODIUM 70 MILLIGRAM(S): 100 INJECTION SUBCUTANEOUS at 04:30

## 2020-01-01 RX ADMIN — LACTULOSE 10 GRAM(S): 10 SOLUTION ORAL at 17:54

## 2020-01-01 RX ADMIN — Medication 250 MILLIGRAM(S): at 05:54

## 2020-01-01 RX ADMIN — Medication 250 MILLIGRAM(S): at 17:22

## 2020-01-01 RX ADMIN — PANTOPRAZOLE SODIUM 40 MILLIGRAM(S): 20 TABLET, DELAYED RELEASE ORAL at 12:00

## 2020-01-01 RX ADMIN — PROPOFOL 4.25 MICROGRAM(S)/KG/MIN: 10 INJECTION, EMULSION INTRAVENOUS at 12:00

## 2020-01-01 RX ADMIN — MIDAZOLAM HYDROCHLORIDE 1.42 MG/KG/HR: 1 INJECTION, SOLUTION INTRAMUSCULAR; INTRAVENOUS at 22:36

## 2020-01-01 RX ADMIN — Medication 250 MILLIGRAM(S): at 11:02

## 2020-01-01 RX ADMIN — FENTANYL CITRATE 7.08 MICROGRAM(S)/KG/HR: 50 INJECTION INTRAVENOUS at 13:23

## 2020-01-01 RX ADMIN — Medication 3.32 MICROGRAM(S)/KG/MIN: at 14:05

## 2020-01-01 RX ADMIN — Medication 6.8 MICROGRAM(S)/KG/MIN: at 10:11

## 2020-01-01 RX ADMIN — FENTANYL CITRATE 7.08 MICROGRAM(S)/KG/HR: 50 INJECTION INTRAVENOUS at 21:14

## 2020-01-01 RX ADMIN — CHLORHEXIDINE GLUCONATE 15 MILLILITER(S): 213 SOLUTION TOPICAL at 08:42

## 2020-01-01 RX ADMIN — PROPOFOL 4.25 MICROGRAM(S)/KG/MIN: 10 INJECTION, EMULSION INTRAVENOUS at 12:55

## 2020-01-01 RX ADMIN — Medication 100 MILLIGRAM(S): at 05:56

## 2020-01-01 RX ADMIN — PANTOPRAZOLE SODIUM 40 MILLIGRAM(S): 20 TABLET, DELAYED RELEASE ORAL at 11:07

## 2020-01-01 RX ADMIN — ENOXAPARIN SODIUM 70 MILLIGRAM(S): 100 INJECTION SUBCUTANEOUS at 07:17

## 2020-01-01 RX ADMIN — PROPOFOL 4.25 MICROGRAM(S)/KG/MIN: 10 INJECTION, EMULSION INTRAVENOUS at 23:10

## 2020-01-01 RX ADMIN — PROPOFOL 4.25 MICROGRAM(S)/KG/MIN: 10 INJECTION, EMULSION INTRAVENOUS at 14:00

## 2020-01-01 RX ADMIN — Medication 100 MILLIEQUIVALENT(S): at 10:15

## 2020-01-01 RX ADMIN — MIDAZOLAM HYDROCHLORIDE 1.42 MG/KG/HR: 1 INJECTION, SOLUTION INTRAMUSCULAR; INTRAVENOUS at 15:50

## 2020-01-01 RX ADMIN — ENOXAPARIN SODIUM 70 MILLIGRAM(S): 100 INJECTION SUBCUTANEOUS at 05:42

## 2020-01-01 RX ADMIN — FENTANYL CITRATE 7.08 MICROGRAM(S)/KG/HR: 50 INJECTION INTRAVENOUS at 01:34

## 2020-01-01 RX ADMIN — Medication 100 MILLIGRAM(S): at 18:53

## 2020-01-01 RX ADMIN — ENOXAPARIN SODIUM 70 MILLIGRAM(S): 100 INJECTION SUBCUTANEOUS at 17:15

## 2020-01-01 RX ADMIN — CHLORHEXIDINE GLUCONATE 1 APPLICATION(S): 213 SOLUTION TOPICAL at 05:58

## 2020-01-01 RX ADMIN — CHLORHEXIDINE GLUCONATE 15 MILLILITER(S): 213 SOLUTION TOPICAL at 05:27

## 2020-01-01 RX ADMIN — POTASSIUM PHOSPHATE, MONOBASIC POTASSIUM PHOSPHATE, DIBASIC 83.33 MILLIMOLE(S): 236; 224 INJECTION, SOLUTION INTRAVENOUS at 09:00

## 2020-01-01 RX ADMIN — CEFEPIME 100 MILLIGRAM(S): 1 INJECTION, POWDER, FOR SOLUTION INTRAMUSCULAR; INTRAVENOUS at 22:20

## 2020-01-01 RX ADMIN — PHENYLEPHRINE HYDROCHLORIDE 1.33 MICROGRAM(S)/KG/MIN: 10 INJECTION INTRAVENOUS at 08:00

## 2020-01-01 RX ADMIN — Medication 100 MILLIEQUIVALENT(S): at 03:23

## 2020-01-01 RX ADMIN — CHLORHEXIDINE GLUCONATE 15 MILLILITER(S): 213 SOLUTION TOPICAL at 05:04

## 2020-01-01 RX ADMIN — CHLORHEXIDINE GLUCONATE 15 MILLILITER(S): 213 SOLUTION TOPICAL at 18:06

## 2020-01-01 RX ADMIN — ENOXAPARIN SODIUM 40 MILLIGRAM(S): 100 INJECTION SUBCUTANEOUS at 19:37

## 2020-01-01 RX ADMIN — Medication 100 MILLIEQUIVALENT(S): at 00:15

## 2020-01-01 RX ADMIN — CEFEPIME 100 MILLIGRAM(S): 1 INJECTION, POWDER, FOR SOLUTION INTRAMUSCULAR; INTRAVENOUS at 21:42

## 2020-01-01 RX ADMIN — CHLORHEXIDINE GLUCONATE 15 MILLILITER(S): 213 SOLUTION TOPICAL at 19:14

## 2020-01-01 RX ADMIN — Medication 400 MILLIGRAM(S): at 01:01

## 2020-01-01 RX ADMIN — PROPOFOL 4.25 MICROGRAM(S)/KG/MIN: 10 INJECTION, EMULSION INTRAVENOUS at 21:50

## 2020-01-01 RX ADMIN — MIDAZOLAM HYDROCHLORIDE 1.42 MG/KG/HR: 1 INJECTION, SOLUTION INTRAMUSCULAR; INTRAVENOUS at 17:14

## 2020-01-01 RX ADMIN — ENOXAPARIN SODIUM 70 MILLIGRAM(S): 100 INJECTION SUBCUTANEOUS at 05:01

## 2020-01-01 RX ADMIN — FENTANYL CITRATE 7.08 MICROGRAM(S)/KG/HR: 50 INJECTION INTRAVENOUS at 08:00

## 2020-01-01 RX ADMIN — Medication 250 MILLIGRAM(S): at 04:31

## 2020-01-01 RX ADMIN — LACTULOSE 10 GRAM(S): 10 SOLUTION ORAL at 05:42

## 2020-01-01 RX ADMIN — LACTULOSE 10 GRAM(S): 10 SOLUTION ORAL at 16:25

## 2020-01-01 RX ADMIN — Medication 400 MILLIGRAM(S): at 21:18

## 2020-01-01 RX ADMIN — CHLORHEXIDINE GLUCONATE 15 MILLILITER(S): 213 SOLUTION TOPICAL at 05:24

## 2020-01-01 RX ADMIN — CEFEPIME 100 MILLIGRAM(S): 1 INJECTION, POWDER, FOR SOLUTION INTRAMUSCULAR; INTRAVENOUS at 23:00

## 2020-01-01 RX ADMIN — Medication 3.32 MICROGRAM(S)/KG/MIN: at 21:53

## 2020-01-01 RX ADMIN — Medication 100 MILLIEQUIVALENT(S): at 10:42

## 2020-01-01 RX ADMIN — POTASSIUM PHOSPHATE, MONOBASIC POTASSIUM PHOSPHATE, DIBASIC 62.5 MILLIMOLE(S): 236; 224 INJECTION, SOLUTION INTRAVENOUS at 10:00

## 2020-01-01 RX ADMIN — CHLORHEXIDINE GLUCONATE 1 APPLICATION(S): 213 SOLUTION TOPICAL at 05:53

## 2020-01-01 RX ADMIN — PHENYLEPHRINE HYDROCHLORIDE 1.33 MICROGRAM(S)/KG/MIN: 10 INJECTION INTRAVENOUS at 06:53

## 2020-01-01 RX ADMIN — HYDROMORPHONE HYDROCHLORIDE 2 MG/HR: 2 INJECTION INTRAMUSCULAR; INTRAVENOUS; SUBCUTANEOUS at 09:06

## 2020-01-01 RX ADMIN — Medication 40 MILLIEQUIVALENT(S): at 08:51

## 2020-01-01 RX ADMIN — CHLORHEXIDINE GLUCONATE 15 MILLILITER(S): 213 SOLUTION TOPICAL at 17:12

## 2020-01-01 RX ADMIN — FENTANYL CITRATE 1 PATCH: 50 INJECTION INTRAVENOUS at 20:11

## 2020-01-01 RX ADMIN — CHLORHEXIDINE GLUCONATE 15 MILLILITER(S): 213 SOLUTION TOPICAL at 05:50

## 2020-01-01 RX ADMIN — LACTULOSE 10 GRAM(S): 10 SOLUTION ORAL at 17:11

## 2020-01-01 RX ADMIN — FENTANYL CITRATE 7.08 MICROGRAM(S)/KG/HR: 50 INJECTION INTRAVENOUS at 07:45

## 2020-01-01 RX ADMIN — ENOXAPARIN SODIUM 70 MILLIGRAM(S): 100 INJECTION SUBCUTANEOUS at 06:07

## 2020-01-01 RX ADMIN — MUPIROCIN 1 APPLICATION(S): 20 OINTMENT TOPICAL at 06:17

## 2020-01-01 RX ADMIN — PHENYLEPHRINE HYDROCHLORIDE 1.33 MICROGRAM(S)/KG/MIN: 10 INJECTION INTRAVENOUS at 00:00

## 2020-01-01 RX ADMIN — SENNA PLUS 1 TABLET(S): 8.6 TABLET ORAL at 17:33

## 2020-01-01 RX ADMIN — ENOXAPARIN SODIUM 70 MILLIGRAM(S): 100 INJECTION SUBCUTANEOUS at 16:50

## 2020-01-01 RX ADMIN — MUPIROCIN 1 APPLICATION(S): 20 OINTMENT TOPICAL at 17:54

## 2020-01-01 RX ADMIN — CHLORHEXIDINE GLUCONATE 15 MILLILITER(S): 213 SOLUTION TOPICAL at 05:17

## 2020-01-01 RX ADMIN — CHLORHEXIDINE GLUCONATE 15 MILLILITER(S): 213 SOLUTION TOPICAL at 17:54

## 2020-01-01 RX ADMIN — ENOXAPARIN SODIUM 70 MILLIGRAM(S): 100 INJECTION SUBCUTANEOUS at 17:07

## 2020-01-01 RX ADMIN — SENNA PLUS 1 TABLET(S): 8.6 TABLET ORAL at 06:18

## 2020-01-01 RX ADMIN — FENTANYL CITRATE 1 PATCH: 50 INJECTION INTRAVENOUS at 09:07

## 2020-01-01 RX ADMIN — PROPOFOL 4.25 MICROGRAM(S)/KG/MIN: 10 INJECTION, EMULSION INTRAVENOUS at 04:44

## 2020-01-01 RX ADMIN — CEFEPIME 100 MILLIGRAM(S): 1 INJECTION, POWDER, FOR SOLUTION INTRAMUSCULAR; INTRAVENOUS at 22:00

## 2020-01-01 RX ADMIN — Medication 40 MILLIGRAM(S): at 05:34

## 2020-01-01 RX ADMIN — MIDAZOLAM HYDROCHLORIDE 1.42 MG/KG/HR: 1 INJECTION, SOLUTION INTRAMUSCULAR; INTRAVENOUS at 13:55

## 2020-01-01 RX ADMIN — PANTOPRAZOLE SODIUM 40 MILLIGRAM(S): 20 TABLET, DELAYED RELEASE ORAL at 11:02

## 2020-01-01 RX ADMIN — Medication 50 MILLIGRAM(S): at 23:00

## 2020-01-01 RX ADMIN — ENOXAPARIN SODIUM 70 MILLIGRAM(S): 100 INJECTION SUBCUTANEOUS at 17:12

## 2020-01-01 RX ADMIN — Medication 100 MILLIEQUIVALENT(S): at 07:42

## 2020-01-01 RX ADMIN — ENOXAPARIN SODIUM 70 MILLIGRAM(S): 100 INJECTION SUBCUTANEOUS at 17:36

## 2020-01-01 RX ADMIN — PROPOFOL 4.25 MICROGRAM(S)/KG/MIN: 10 INJECTION, EMULSION INTRAVENOUS at 09:50

## 2020-01-01 RX ADMIN — MUPIROCIN 1 APPLICATION(S): 20 OINTMENT TOPICAL at 06:08

## 2020-01-01 RX ADMIN — POTASSIUM PHOSPHATE, MONOBASIC POTASSIUM PHOSPHATE, DIBASIC 83.33 MILLIMOLE(S): 236; 224 INJECTION, SOLUTION INTRAVENOUS at 00:00

## 2020-01-01 RX ADMIN — LACTULOSE 10 GRAM(S): 10 SOLUTION ORAL at 07:17

## 2020-01-01 RX ADMIN — CHLORHEXIDINE GLUCONATE 15 MILLILITER(S): 213 SOLUTION TOPICAL at 16:25

## 2020-01-01 RX ADMIN — CEFEPIME 100 MILLIGRAM(S): 1 INJECTION, POWDER, FOR SOLUTION INTRAMUSCULAR; INTRAVENOUS at 06:42

## 2020-01-01 RX ADMIN — CEFEPIME 100 MILLIGRAM(S): 1 INJECTION, POWDER, FOR SOLUTION INTRAMUSCULAR; INTRAVENOUS at 12:13

## 2020-01-01 RX ADMIN — POTASSIUM PHOSPHATE, MONOBASIC POTASSIUM PHOSPHATE, DIBASIC 83.33 MILLIMOLE(S): 236; 224 INJECTION, SOLUTION INTRAVENOUS at 02:12

## 2020-01-01 RX ADMIN — ENOXAPARIN SODIUM 40 MILLIGRAM(S): 100 INJECTION SUBCUTANEOUS at 12:45

## 2020-01-01 RX ADMIN — PANTOPRAZOLE SODIUM 40 MILLIGRAM(S): 20 TABLET, DELAYED RELEASE ORAL at 07:59

## 2020-01-01 RX ADMIN — LACTULOSE 10 GRAM(S): 10 SOLUTION ORAL at 17:18

## 2020-01-01 RX ADMIN — SODIUM CHLORIDE 1000 MILLILITER(S): 9 INJECTION, SOLUTION INTRAVENOUS at 23:23

## 2020-01-01 RX ADMIN — Medication 5 MILLIGRAM(S): at 10:06

## 2020-01-01 RX ADMIN — DEXMEDETOMIDINE HYDROCHLORIDE IN 0.9% SODIUM CHLORIDE 3.54 MICROGRAM(S)/KG/HR: 4 INJECTION INTRAVENOUS at 11:39

## 2020-01-01 RX ADMIN — CHLORHEXIDINE GLUCONATE 15 MILLILITER(S): 213 SOLUTION TOPICAL at 05:22

## 2020-01-01 RX ADMIN — CHLORHEXIDINE GLUCONATE 15 MILLILITER(S): 213 SOLUTION TOPICAL at 06:39

## 2020-01-01 RX ADMIN — DEXMEDETOMIDINE HYDROCHLORIDE IN 0.9% SODIUM CHLORIDE 3.54 MICROGRAM(S)/KG/HR: 4 INJECTION INTRAVENOUS at 03:51

## 2020-01-01 RX ADMIN — Medication 40 MILLIGRAM(S): at 06:37

## 2020-01-01 RX ADMIN — PROPOFOL 4.25 MICROGRAM(S)/KG/MIN: 10 INJECTION, EMULSION INTRAVENOUS at 08:01

## 2020-01-01 RX ADMIN — CHLORHEXIDINE GLUCONATE 15 MILLILITER(S): 213 SOLUTION TOPICAL at 05:03

## 2020-01-01 RX ADMIN — DEXTROSE MONOHYDRATE, SODIUM CHLORIDE, AND POTASSIUM CHLORIDE 80 MILLILITER(S): 50; .745; 4.5 INJECTION, SOLUTION INTRAVENOUS at 12:00

## 2020-01-01 RX ADMIN — LACTULOSE 10 GRAM(S): 10 SOLUTION ORAL at 05:19

## 2020-01-01 RX ADMIN — Medication 6.8 MICROGRAM(S)/KG/MIN: at 18:55

## 2020-01-01 RX ADMIN — Medication 50 MILLIGRAM(S): at 06:40

## 2020-01-01 RX ADMIN — PANTOPRAZOLE SODIUM 40 MILLIGRAM(S): 20 TABLET, DELAYED RELEASE ORAL at 12:02

## 2020-01-01 RX ADMIN — Medication 250 MILLIGRAM(S): at 17:12

## 2020-01-01 RX ADMIN — Medication 100 MILLIGRAM(S): at 21:16

## 2020-01-01 RX ADMIN — Medication 5 MILLIGRAM(S): at 03:34

## 2020-01-01 RX ADMIN — HYDROMORPHONE HYDROCHLORIDE 2 MG/HR: 2 INJECTION INTRAMUSCULAR; INTRAVENOUS; SUBCUTANEOUS at 19:27

## 2020-01-01 RX ADMIN — Medication 650 MILLIGRAM(S): at 20:51

## 2020-01-01 RX ADMIN — CHLORHEXIDINE GLUCONATE 1 APPLICATION(S): 213 SOLUTION TOPICAL at 05:50

## 2020-01-01 RX ADMIN — Medication 100 MILLIEQUIVALENT(S): at 18:09

## 2020-01-01 RX ADMIN — Medication 250 MILLIGRAM(S): at 05:01

## 2020-01-01 RX ADMIN — MIDAZOLAM HYDROCHLORIDE 1.42 MG/KG/HR: 1 INJECTION, SOLUTION INTRAMUSCULAR; INTRAVENOUS at 09:06

## 2020-01-01 RX ADMIN — PANTOPRAZOLE SODIUM 40 MILLIGRAM(S): 20 TABLET, DELAYED RELEASE ORAL at 10:01

## 2020-01-01 RX ADMIN — FENTANYL CITRATE 7.08 MICROGRAM(S)/KG/HR: 50 INJECTION INTRAVENOUS at 14:03

## 2020-01-01 RX ADMIN — PROPOFOL 4.25 MICROGRAM(S)/KG/MIN: 10 INJECTION, EMULSION INTRAVENOUS at 03:36

## 2020-01-01 RX ADMIN — ENOXAPARIN SODIUM 70 MILLIGRAM(S): 100 INJECTION SUBCUTANEOUS at 05:03

## 2020-01-01 RX ADMIN — POTASSIUM PHOSPHATE, MONOBASIC POTASSIUM PHOSPHATE, DIBASIC 83.33 MILLIMOLE(S): 236; 224 INJECTION, SOLUTION INTRAVENOUS at 08:54

## 2020-01-01 RX ADMIN — CHLORHEXIDINE GLUCONATE 1 APPLICATION(S): 213 SOLUTION TOPICAL at 05:20

## 2020-01-01 RX ADMIN — CHLORHEXIDINE GLUCONATE 15 MILLILITER(S): 213 SOLUTION TOPICAL at 05:44

## 2020-01-01 RX ADMIN — LACTULOSE 10 GRAM(S): 10 SOLUTION ORAL at 05:45

## 2020-01-01 RX ADMIN — Medication 650 MILLIGRAM(S): at 12:18

## 2020-01-01 RX ADMIN — Medication 100 MILLIEQUIVALENT(S): at 23:08

## 2020-01-01 RX ADMIN — Medication 40 MILLIGRAM(S): at 17:52

## 2020-01-01 RX ADMIN — PROPOFOL 4.25 MICROGRAM(S)/KG/MIN: 10 INJECTION, EMULSION INTRAVENOUS at 22:48

## 2020-01-01 RX ADMIN — CHLORHEXIDINE GLUCONATE 1 APPLICATION(S): 213 SOLUTION TOPICAL at 06:06

## 2020-01-01 RX ADMIN — CEFEPIME 100 MILLIGRAM(S): 1 INJECTION, POWDER, FOR SOLUTION INTRAMUSCULAR; INTRAVENOUS at 21:51

## 2020-01-01 RX ADMIN — Medication 6.8 MICROGRAM(S)/KG/MIN: at 17:44

## 2020-01-01 RX ADMIN — CEFEPIME 100 MILLIGRAM(S): 1 INJECTION, POWDER, FOR SOLUTION INTRAMUSCULAR; INTRAVENOUS at 14:00

## 2020-01-01 RX ADMIN — LACTULOSE 10 GRAM(S): 10 SOLUTION ORAL at 05:20

## 2020-01-01 RX ADMIN — PROPOFOL 4.25 MICROGRAM(S)/KG/MIN: 10 INJECTION, EMULSION INTRAVENOUS at 23:14

## 2020-01-01 RX ADMIN — Medication 50 MILLIGRAM(S): at 12:52

## 2020-01-01 RX ADMIN — Medication 100 MILLIEQUIVALENT(S): at 17:00

## 2020-01-01 RX ADMIN — CHLORHEXIDINE GLUCONATE 15 MILLILITER(S): 213 SOLUTION TOPICAL at 08:38

## 2020-01-01 RX ADMIN — LACTULOSE 10 GRAM(S): 10 SOLUTION ORAL at 06:41

## 2020-01-01 RX ADMIN — Medication 100 MILLIEQUIVALENT(S): at 10:29

## 2020-01-01 RX ADMIN — Medication 40 MILLIGRAM(S): at 16:42

## 2020-01-01 RX ADMIN — CHLORHEXIDINE GLUCONATE 15 MILLILITER(S): 213 SOLUTION TOPICAL at 17:34

## 2020-01-01 RX ADMIN — LACTULOSE 10 GRAM(S): 10 SOLUTION ORAL at 18:06

## 2020-01-01 RX ADMIN — CHLORHEXIDINE GLUCONATE 1 APPLICATION(S): 213 SOLUTION TOPICAL at 05:27

## 2020-01-01 RX ADMIN — PROPOFOL 4.25 MICROGRAM(S)/KG/MIN: 10 INJECTION, EMULSION INTRAVENOUS at 17:14

## 2020-01-01 RX ADMIN — HYDROMORPHONE HYDROCHLORIDE 4 MG/HR: 2 INJECTION INTRAMUSCULAR; INTRAVENOUS; SUBCUTANEOUS at 19:26

## 2020-01-01 RX ADMIN — Medication 100 MILLIEQUIVALENT(S): at 09:06

## 2020-01-01 RX ADMIN — LACTULOSE 10 GRAM(S): 10 SOLUTION ORAL at 16:50

## 2020-01-01 RX ADMIN — MIDAZOLAM HYDROCHLORIDE 4 MILLIGRAM(S): 1 INJECTION, SOLUTION INTRAMUSCULAR; INTRAVENOUS at 19:30

## 2020-01-01 RX ADMIN — CHLORHEXIDINE GLUCONATE 1 APPLICATION(S): 213 SOLUTION TOPICAL at 05:04

## 2020-01-01 RX ADMIN — Medication 400 MILLIGRAM(S): at 18:06

## 2020-01-01 RX ADMIN — MUPIROCIN 1 APPLICATION(S): 20 OINTMENT TOPICAL at 05:27

## 2020-01-01 RX ADMIN — CHLORHEXIDINE GLUCONATE 1 APPLICATION(S): 213 SOLUTION TOPICAL at 07:17

## 2020-01-01 RX ADMIN — CEFEPIME 100 MILLIGRAM(S): 1 INJECTION, POWDER, FOR SOLUTION INTRAMUSCULAR; INTRAVENOUS at 04:27

## 2020-01-01 RX ADMIN — SENNA PLUS 1 TABLET(S): 8.6 TABLET ORAL at 16:38

## 2020-01-01 RX ADMIN — MIDAZOLAM HYDROCHLORIDE 1.42 MG/KG/HR: 1 INJECTION, SOLUTION INTRAMUSCULAR; INTRAVENOUS at 23:50

## 2020-01-01 RX ADMIN — PANTOPRAZOLE SODIUM 40 MILLIGRAM(S): 20 TABLET, DELAYED RELEASE ORAL at 13:05

## 2020-01-01 RX ADMIN — LACTULOSE 10 GRAM(S): 10 SOLUTION ORAL at 05:52

## 2020-01-01 RX ADMIN — PROPOFOL 4.25 MICROGRAM(S)/KG/MIN: 10 INJECTION, EMULSION INTRAVENOUS at 14:04

## 2020-01-01 RX ADMIN — SODIUM CHLORIDE 1000 MILLILITER(S): 9 INJECTION INTRAMUSCULAR; INTRAVENOUS; SUBCUTANEOUS at 15:08

## 2020-01-01 RX ADMIN — Medication 3.32 MICROGRAM(S)/KG/MIN: at 01:34

## 2020-01-01 RX ADMIN — Medication 12.5 MILLIGRAM(S): at 17:17

## 2020-01-01 RX ADMIN — ENOXAPARIN SODIUM 70 MILLIGRAM(S): 100 INJECTION SUBCUTANEOUS at 17:00

## 2020-01-01 RX ADMIN — FENTANYL CITRATE 7.08 MICROGRAM(S)/KG/HR: 50 INJECTION INTRAVENOUS at 02:45

## 2020-01-01 RX ADMIN — PANTOPRAZOLE SODIUM 40 MILLIGRAM(S): 20 TABLET, DELAYED RELEASE ORAL at 11:47

## 2020-01-01 RX ADMIN — PANTOPRAZOLE SODIUM 40 MILLIGRAM(S): 20 TABLET, DELAYED RELEASE ORAL at 09:03

## 2020-01-01 RX ADMIN — PHENYLEPHRINE HYDROCHLORIDE 2.66 MICROGRAM(S)/KG/MIN: 10 INJECTION INTRAVENOUS at 18:06

## 2020-01-01 RX ADMIN — LACTULOSE 10 GRAM(S): 10 SOLUTION ORAL at 16:01

## 2020-01-01 RX ADMIN — MUPIROCIN 1 APPLICATION(S): 20 OINTMENT TOPICAL at 17:24

## 2020-01-01 RX ADMIN — Medication 250 MILLIGRAM(S): at 20:22

## 2020-01-01 RX ADMIN — ENOXAPARIN SODIUM 70 MILLIGRAM(S): 100 INJECTION SUBCUTANEOUS at 17:03

## 2020-01-01 RX ADMIN — PANTOPRAZOLE SODIUM 40 MILLIGRAM(S): 20 TABLET, DELAYED RELEASE ORAL at 11:09

## 2020-01-01 RX ADMIN — POTASSIUM PHOSPHATE, MONOBASIC POTASSIUM PHOSPHATE, DIBASIC 62.5 MILLIMOLE(S): 236; 224 INJECTION, SOLUTION INTRAVENOUS at 07:42

## 2020-01-01 RX ADMIN — LACTULOSE 10 GRAM(S): 10 SOLUTION ORAL at 17:12

## 2020-01-01 RX ADMIN — CHLORHEXIDINE GLUCONATE 15 MILLILITER(S): 213 SOLUTION TOPICAL at 17:11

## 2020-01-01 RX ADMIN — ENOXAPARIN SODIUM 40 MILLIGRAM(S): 100 INJECTION SUBCUTANEOUS at 12:26

## 2020-01-01 RX ADMIN — LACTULOSE 10 GRAM(S): 10 SOLUTION ORAL at 17:15

## 2020-01-01 RX ADMIN — CHLORHEXIDINE GLUCONATE 15 MILLILITER(S): 213 SOLUTION TOPICAL at 05:52

## 2020-01-01 RX ADMIN — Medication 50 MILLIGRAM(S): at 18:48

## 2020-01-01 RX ADMIN — Medication 400 MILLIGRAM(S): at 21:16

## 2020-01-01 RX ADMIN — ENOXAPARIN SODIUM 70 MILLIGRAM(S): 100 INJECTION SUBCUTANEOUS at 17:53

## 2020-01-01 RX ADMIN — CHLORHEXIDINE GLUCONATE 1 APPLICATION(S): 213 SOLUTION TOPICAL at 07:30

## 2020-01-01 RX ADMIN — CEFEPIME 100 MILLIGRAM(S): 1 INJECTION, POWDER, FOR SOLUTION INTRAMUSCULAR; INTRAVENOUS at 22:27

## 2020-01-01 RX ADMIN — LACTULOSE 10 GRAM(S): 10 SOLUTION ORAL at 04:27

## 2020-01-01 RX ADMIN — CHLORHEXIDINE GLUCONATE 15 MILLILITER(S): 213 SOLUTION TOPICAL at 05:42

## 2020-01-01 RX ADMIN — PANTOPRAZOLE SODIUM 40 MILLIGRAM(S): 20 TABLET, DELAYED RELEASE ORAL at 09:06

## 2020-01-01 RX ADMIN — PHENYLEPHRINE HYDROCHLORIDE 2.66 MICROGRAM(S)/KG/MIN: 10 INJECTION INTRAVENOUS at 18:55

## 2020-01-01 RX ADMIN — Medication 100 MILLIGRAM(S): at 22:20

## 2020-01-01 RX ADMIN — PROPOFOL 4.25 MICROGRAM(S)/KG/MIN: 10 INJECTION, EMULSION INTRAVENOUS at 01:51

## 2020-01-01 RX ADMIN — ENOXAPARIN SODIUM 70 MILLIGRAM(S): 100 INJECTION SUBCUTANEOUS at 17:22

## 2020-01-01 RX ADMIN — Medication 400 MILLIGRAM(S): at 07:45

## 2020-01-01 RX ADMIN — MIDAZOLAM HYDROCHLORIDE 2 MILLIGRAM(S): 1 INJECTION, SOLUTION INTRAMUSCULAR; INTRAVENOUS at 20:45

## 2020-01-01 RX ADMIN — PROPOFOL 4.25 MICROGRAM(S)/KG/MIN: 10 INJECTION, EMULSION INTRAVENOUS at 03:49

## 2020-01-01 RX ADMIN — PANTOPRAZOLE SODIUM 40 MILLIGRAM(S): 20 TABLET, DELAYED RELEASE ORAL at 11:56

## 2020-01-01 RX ADMIN — PROPOFOL 4.25 MICROGRAM(S)/KG/MIN: 10 INJECTION, EMULSION INTRAVENOUS at 05:51

## 2020-01-01 RX ADMIN — PANTOPRAZOLE SODIUM 40 MILLIGRAM(S): 20 TABLET, DELAYED RELEASE ORAL at 12:52

## 2020-01-01 RX ADMIN — ENOXAPARIN SODIUM 70 MILLIGRAM(S): 100 INJECTION SUBCUTANEOUS at 05:25

## 2020-01-01 RX ADMIN — CEFEPIME 100 MILLIGRAM(S): 1 INJECTION, POWDER, FOR SOLUTION INTRAMUSCULAR; INTRAVENOUS at 13:05

## 2020-01-01 RX ADMIN — PROPOFOL 100 MILLIGRAM(S): 10 INJECTION, EMULSION INTRAVENOUS at 19:11

## 2020-01-01 RX ADMIN — PROPOFOL 4.25 MICROGRAM(S)/KG/MIN: 10 INJECTION, EMULSION INTRAVENOUS at 02:35

## 2020-01-01 RX ADMIN — Medication 6.8 MICROGRAM(S)/KG/MIN: at 13:56

## 2020-01-01 RX ADMIN — CEFEPIME 100 MILLIGRAM(S): 1 INJECTION, POWDER, FOR SOLUTION INTRAMUSCULAR; INTRAVENOUS at 05:25

## 2020-01-01 RX ADMIN — Medication 6.8 MICROGRAM(S)/KG/MIN: at 20:30

## 2020-01-01 RX ADMIN — LACTULOSE 10 GRAM(S): 10 SOLUTION ORAL at 17:03

## 2020-01-01 RX ADMIN — CEFEPIME 100 MILLIGRAM(S): 1 INJECTION, POWDER, FOR SOLUTION INTRAMUSCULAR; INTRAVENOUS at 13:29

## 2020-01-01 RX ADMIN — Medication 100 MILLIEQUIVALENT(S): at 05:00

## 2020-01-01 RX ADMIN — Medication 40 MILLIGRAM(S): at 10:29

## 2020-01-01 RX ADMIN — MIDAZOLAM HYDROCHLORIDE 1.42 MG/KG/HR: 1 INJECTION, SOLUTION INTRAMUSCULAR; INTRAVENOUS at 01:35

## 2020-01-01 RX ADMIN — CHLORHEXIDINE GLUCONATE 15 MILLILITER(S): 213 SOLUTION TOPICAL at 17:24

## 2020-01-01 RX ADMIN — LACTULOSE 10 GRAM(S): 10 SOLUTION ORAL at 05:03

## 2020-01-01 RX ADMIN — Medication 40 MILLIEQUIVALENT(S): at 13:32

## 2020-01-01 RX ADMIN — TOCILIZUMAB 100 MILLIGRAM(S): 20 INJECTION, SOLUTION, CONCENTRATE INTRAVENOUS at 06:55

## 2020-01-01 RX ADMIN — CHLORHEXIDINE GLUCONATE 15 MILLILITER(S): 213 SOLUTION TOPICAL at 17:36

## 2020-01-01 RX ADMIN — FENTANYL CITRATE 7.08 MICROGRAM(S)/KG/HR: 50 INJECTION INTRAVENOUS at 22:47

## 2020-01-01 RX ADMIN — PANTOPRAZOLE SODIUM 40 MILLIGRAM(S): 20 TABLET, DELAYED RELEASE ORAL at 14:00

## 2020-01-01 RX ADMIN — ENOXAPARIN SODIUM 70 MILLIGRAM(S): 100 INJECTION SUBCUTANEOUS at 05:50

## 2020-01-01 RX ADMIN — Medication 6.8 MICROGRAM(S)/KG/MIN: at 06:35

## 2020-01-01 RX ADMIN — CHLORHEXIDINE GLUCONATE 15 MILLILITER(S): 213 SOLUTION TOPICAL at 08:32

## 2020-01-01 RX ADMIN — SENNA PLUS 1 TABLET(S): 8.6 TABLET ORAL at 20:21

## 2020-01-01 RX ADMIN — CHLORHEXIDINE GLUCONATE 1 APPLICATION(S): 213 SOLUTION TOPICAL at 05:18

## 2020-01-01 RX ADMIN — CHLORHEXIDINE GLUCONATE 15 MILLILITER(S): 213 SOLUTION TOPICAL at 09:03

## 2020-01-01 RX ADMIN — Medication 100 MILLIEQUIVALENT(S): at 07:57

## 2020-01-01 RX ADMIN — CEFEPIME 100 MILLIGRAM(S): 1 INJECTION, POWDER, FOR SOLUTION INTRAMUSCULAR; INTRAVENOUS at 13:28

## 2020-01-01 RX ADMIN — Medication 400 MILLIGRAM(S): at 19:05

## 2020-01-01 RX ADMIN — CHLORHEXIDINE GLUCONATE 1 APPLICATION(S): 213 SOLUTION TOPICAL at 04:30

## 2020-01-01 RX ADMIN — ENOXAPARIN SODIUM 70 MILLIGRAM(S): 100 INJECTION SUBCUTANEOUS at 16:37

## 2020-01-01 RX ADMIN — PROPOFOL 4.25 MICROGRAM(S)/KG/MIN: 10 INJECTION, EMULSION INTRAVENOUS at 15:08

## 2020-01-01 RX ADMIN — LACTULOSE 10 GRAM(S): 10 SOLUTION ORAL at 05:04

## 2020-01-01 RX ADMIN — Medication 250 MILLIGRAM(S): at 11:39

## 2020-01-01 RX ADMIN — ENOXAPARIN SODIUM 40 MILLIGRAM(S): 100 INJECTION SUBCUTANEOUS at 13:16

## 2020-01-01 RX ADMIN — PROPOFOL 4.25 MICROGRAM(S)/KG/MIN: 10 INJECTION, EMULSION INTRAVENOUS at 17:13

## 2020-01-01 RX ADMIN — Medication 6.8 MICROGRAM(S)/KG/MIN: at 10:28

## 2020-01-01 RX ADMIN — HYDROMORPHONE HYDROCHLORIDE 2 MG/HR: 2 INJECTION INTRAMUSCULAR; INTRAVENOUS; SUBCUTANEOUS at 18:05

## 2020-01-01 RX ADMIN — Medication 100 MILLIEQUIVALENT(S): at 08:58

## 2020-01-01 RX ADMIN — HYDROMORPHONE HYDROCHLORIDE 2 MG/HR: 2 INJECTION INTRAMUSCULAR; INTRAVENOUS; SUBCUTANEOUS at 13:35

## 2020-01-01 RX ADMIN — PROPOFOL 4.25 MICROGRAM(S)/KG/MIN: 10 INJECTION, EMULSION INTRAVENOUS at 05:28

## 2020-01-01 RX ADMIN — Medication 400 MILLIGRAM(S): at 00:31

## 2020-01-01 RX ADMIN — MIDAZOLAM HYDROCHLORIDE 4 MILLIGRAM(S): 1 INJECTION, SOLUTION INTRAMUSCULAR; INTRAVENOUS at 16:18

## 2020-01-01 RX ADMIN — PANTOPRAZOLE SODIUM 40 MILLIGRAM(S): 20 TABLET, DELAYED RELEASE ORAL at 11:38

## 2020-01-01 RX ADMIN — CHLORHEXIDINE GLUCONATE 15 MILLILITER(S): 213 SOLUTION TOPICAL at 17:16

## 2020-01-01 RX ADMIN — BENZOCAINE AND MENTHOL 1 LOZENGE: 5; 1 LIQUID ORAL at 06:39

## 2020-01-01 RX ADMIN — CHLORHEXIDINE GLUCONATE 15 MILLILITER(S): 213 SOLUTION TOPICAL at 17:13

## 2020-01-01 RX ADMIN — FENTANYL CITRATE 1 PATCH: 50 INJECTION INTRAVENOUS at 08:39

## 2020-01-01 RX ADMIN — FENTANYL CITRATE 1 PATCH: 50 INJECTION INTRAVENOUS at 22:52

## 2020-01-01 RX ADMIN — CEFEPIME 100 MILLIGRAM(S): 1 INJECTION, POWDER, FOR SOLUTION INTRAMUSCULAR; INTRAVENOUS at 05:58

## 2020-01-01 RX ADMIN — FENTANYL CITRATE 1 PATCH: 50 INJECTION INTRAVENOUS at 17:55

## 2020-01-01 RX ADMIN — FENTANYL CITRATE 7.08 MICROGRAM(S)/KG/HR: 50 INJECTION INTRAVENOUS at 16:08

## 2020-01-01 RX ADMIN — CHLORHEXIDINE GLUCONATE 15 MILLILITER(S): 213 SOLUTION TOPICAL at 08:40

## 2020-01-01 RX ADMIN — ENOXAPARIN SODIUM 70 MILLIGRAM(S): 100 INJECTION SUBCUTANEOUS at 05:51

## 2020-01-01 RX ADMIN — CHLORHEXIDINE GLUCONATE 15 MILLILITER(S): 213 SOLUTION TOPICAL at 05:26

## 2020-01-01 RX ADMIN — PROPOFOL 17 MICROGRAM(S)/KG/MIN: 10 INJECTION, EMULSION INTRAVENOUS at 19:07

## 2020-01-01 RX ADMIN — Medication 40 MILLIEQUIVALENT(S): at 17:24

## 2020-01-01 RX ADMIN — CHLORHEXIDINE GLUCONATE 15 MILLILITER(S): 213 SOLUTION TOPICAL at 06:09

## 2020-01-01 RX ADMIN — Medication 400 MILLIGRAM(S): at 21:42

## 2020-01-01 RX ADMIN — MIDAZOLAM HYDROCHLORIDE 1.42 MG/KG/HR: 1 INJECTION, SOLUTION INTRAMUSCULAR; INTRAVENOUS at 21:00

## 2020-01-01 RX ADMIN — CHLORHEXIDINE GLUCONATE 15 MILLILITER(S): 213 SOLUTION TOPICAL at 05:02

## 2020-01-01 RX ADMIN — Medication 100 MILLIGRAM(S): at 18:39

## 2020-01-01 RX ADMIN — Medication 100 MILLIEQUIVALENT(S): at 09:34

## 2020-01-01 RX ADMIN — HYDROMORPHONE HYDROCHLORIDE 2 MG/HR: 2 INJECTION INTRAMUSCULAR; INTRAVENOUS; SUBCUTANEOUS at 21:53

## 2020-01-01 RX ADMIN — Medication 40 MILLIEQUIVALENT(S): at 14:00

## 2020-01-01 RX ADMIN — Medication 100 MILLIEQUIVALENT(S): at 17:30

## 2020-01-01 RX ADMIN — Medication 250 MILLIGRAM(S): at 17:01

## 2020-01-01 RX ADMIN — Medication 50 MILLIGRAM(S): at 01:00

## 2020-01-01 RX ADMIN — POTASSIUM PHOSPHATE, MONOBASIC POTASSIUM PHOSPHATE, DIBASIC 83.33 MILLIMOLE(S): 236; 224 INJECTION, SOLUTION INTRAVENOUS at 11:00

## 2020-01-01 RX ADMIN — Medication 20 MILLIGRAM(S): at 11:57

## 2020-01-01 RX ADMIN — LACTULOSE 10 GRAM(S): 10 SOLUTION ORAL at 05:01

## 2020-01-01 RX ADMIN — Medication 6.8 MICROGRAM(S)/KG/MIN: at 12:53

## 2020-01-01 RX ADMIN — CEFEPIME 100 MILLIGRAM(S): 1 INJECTION, POWDER, FOR SOLUTION INTRAMUSCULAR; INTRAVENOUS at 06:07

## 2020-01-01 RX ADMIN — MIDAZOLAM HYDROCHLORIDE 1.42 MG/KG/HR: 1 INJECTION, SOLUTION INTRAMUSCULAR; INTRAVENOUS at 10:49

## 2020-01-01 RX ADMIN — CHLORHEXIDINE GLUCONATE 1 APPLICATION(S): 213 SOLUTION TOPICAL at 05:23

## 2020-01-01 RX ADMIN — SODIUM CHLORIDE 10 MILLILITER(S): 9 INJECTION INTRAMUSCULAR; INTRAVENOUS; SUBCUTANEOUS at 12:55

## 2020-01-01 RX ADMIN — CHLORHEXIDINE GLUCONATE 15 MILLILITER(S): 213 SOLUTION TOPICAL at 16:19

## 2020-01-01 RX ADMIN — Medication 12.5 MILLIGRAM(S): at 05:45

## 2020-01-01 RX ADMIN — PROPOFOL 4.25 MICROGRAM(S)/KG/MIN: 10 INJECTION, EMULSION INTRAVENOUS at 19:26

## 2020-01-01 RX ADMIN — Medication 100 MILLIEQUIVALENT(S): at 09:32

## 2020-01-01 RX ADMIN — Medication 6.8 MICROGRAM(S)/KG/MIN: at 17:55

## 2020-01-01 RX ADMIN — MUPIROCIN 1 APPLICATION(S): 20 OINTMENT TOPICAL at 13:38

## 2020-01-01 RX ADMIN — Medication 50 MILLILITER(S): at 10:09

## 2020-01-01 RX ADMIN — Medication 12.5 MILLIGRAM(S): at 05:18

## 2020-01-01 RX ADMIN — ENOXAPARIN SODIUM 70 MILLIGRAM(S): 100 INJECTION SUBCUTANEOUS at 18:07

## 2020-01-01 RX ADMIN — CEFEPIME 100 MILLIGRAM(S): 1 INJECTION, POWDER, FOR SOLUTION INTRAMUSCULAR; INTRAVENOUS at 13:07

## 2020-01-01 RX ADMIN — MIDAZOLAM HYDROCHLORIDE 1.42 MG/KG/HR: 1 INJECTION, SOLUTION INTRAMUSCULAR; INTRAVENOUS at 03:48

## 2020-01-01 RX ADMIN — HYDROMORPHONE HYDROCHLORIDE 4 MG/HR: 2 INJECTION INTRAMUSCULAR; INTRAVENOUS; SUBCUTANEOUS at 23:44

## 2020-01-01 RX ADMIN — PROPOFOL 4.25 MICROGRAM(S)/KG/MIN: 10 INJECTION, EMULSION INTRAVENOUS at 09:06

## 2020-01-01 RX ADMIN — ENOXAPARIN SODIUM 40 MILLIGRAM(S): 100 INJECTION SUBCUTANEOUS at 11:37

## 2020-01-01 RX ADMIN — CHLORHEXIDINE GLUCONATE 15 MILLILITER(S): 213 SOLUTION TOPICAL at 17:15

## 2020-01-01 RX ADMIN — PROPOFOL 4.25 MICROGRAM(S)/KG/MIN: 10 INJECTION, EMULSION INTRAVENOUS at 16:54

## 2020-01-01 RX ADMIN — Medication 6.8 MICROGRAM(S)/KG/MIN: at 23:43

## 2020-01-01 RX ADMIN — PROPOFOL 4.25 MICROGRAM(S)/KG/MIN: 10 INJECTION, EMULSION INTRAVENOUS at 03:04

## 2020-01-01 RX ADMIN — Medication 20 MILLIGRAM(S): at 12:37

## 2020-01-01 RX ADMIN — ENOXAPARIN SODIUM 70 MILLIGRAM(S): 100 INJECTION SUBCUTANEOUS at 06:40

## 2020-01-01 RX ADMIN — SENNA PLUS 1 TABLET(S): 8.6 TABLET ORAL at 05:01

## 2020-01-01 RX ADMIN — CEFEPIME 100 MILLIGRAM(S): 1 INJECTION, POWDER, FOR SOLUTION INTRAMUSCULAR; INTRAVENOUS at 05:04

## 2020-01-01 RX ADMIN — MIDAZOLAM HYDROCHLORIDE 2 MILLIGRAM(S): 1 INJECTION, SOLUTION INTRAMUSCULAR; INTRAVENOUS at 01:29

## 2020-01-01 RX ADMIN — Medication 400 MILLIGRAM(S): at 22:48

## 2020-01-01 RX ADMIN — PROPOFOL 4.25 MICROGRAM(S)/KG/MIN: 10 INJECTION, EMULSION INTRAVENOUS at 20:03

## 2020-01-01 RX ADMIN — Medication 3.32 MICROGRAM(S)/KG/MIN: at 03:35

## 2020-01-01 RX ADMIN — PROPOFOL 4.25 MICROGRAM(S)/KG/MIN: 10 INJECTION, EMULSION INTRAVENOUS at 03:33

## 2020-01-01 RX ADMIN — Medication 250 MILLIGRAM(S): at 16:19

## 2020-01-01 RX ADMIN — CHLORHEXIDINE GLUCONATE 1 APPLICATION(S): 213 SOLUTION TOPICAL at 06:00

## 2020-01-01 RX ADMIN — LACTULOSE 10 GRAM(S): 10 SOLUTION ORAL at 17:07

## 2020-01-01 RX ADMIN — ENOXAPARIN SODIUM 70 MILLIGRAM(S): 100 INJECTION SUBCUTANEOUS at 17:33

## 2020-01-01 RX ADMIN — FENTANYL CITRATE 7.08 MICROGRAM(S)/KG/HR: 50 INJECTION INTRAVENOUS at 23:08

## 2020-01-01 RX ADMIN — LACTULOSE 10 GRAM(S): 10 SOLUTION ORAL at 18:07

## 2020-01-01 RX ADMIN — BENZOCAINE AND MENTHOL 1 LOZENGE: 5; 1 LIQUID ORAL at 05:56

## 2020-01-01 RX ADMIN — PROPOFOL 4.25 MICROGRAM(S)/KG/MIN: 10 INJECTION, EMULSION INTRAVENOUS at 12:57

## 2020-01-01 RX ADMIN — CHLORHEXIDINE GLUCONATE 15 MILLILITER(S): 213 SOLUTION TOPICAL at 04:30

## 2020-01-01 RX ADMIN — CEFEPIME 100 MILLIGRAM(S): 1 INJECTION, POWDER, FOR SOLUTION INTRAMUSCULAR; INTRAVENOUS at 13:31

## 2020-01-01 RX ADMIN — PROPOFOL 4.25 MICROGRAM(S)/KG/MIN: 10 INJECTION, EMULSION INTRAVENOUS at 04:27

## 2020-01-01 RX ADMIN — PROPOFOL 4.25 MICROGRAM(S)/KG/MIN: 10 INJECTION, EMULSION INTRAVENOUS at 06:43

## 2020-01-01 RX ADMIN — Medication 400 MILLIGRAM(S): at 15:12

## 2020-01-01 RX ADMIN — MIDAZOLAM HYDROCHLORIDE 1.42 MG/KG/HR: 1 INJECTION, SOLUTION INTRAMUSCULAR; INTRAVENOUS at 17:12

## 2020-01-01 RX ADMIN — VASOPRESSIN 2.4 UNIT(S)/MIN: 20 INJECTION INTRAVENOUS at 18:55

## 2020-01-01 RX ADMIN — ENOXAPARIN SODIUM 70 MILLIGRAM(S): 100 INJECTION SUBCUTANEOUS at 05:27

## 2020-01-01 RX ADMIN — SENNA PLUS 1 TABLET(S): 8.6 TABLET ORAL at 05:24

## 2020-01-01 RX ADMIN — LACTULOSE 10 GRAM(S): 10 SOLUTION ORAL at 17:01

## 2020-01-01 RX ADMIN — PROPOFOL 4.25 MICROGRAM(S)/KG/MIN: 10 INJECTION, EMULSION INTRAVENOUS at 05:25

## 2020-01-01 RX ADMIN — Medication 40 MILLIGRAM(S): at 06:54

## 2020-01-01 RX ADMIN — Medication 400 MILLIGRAM(S): at 14:15

## 2020-01-01 RX ADMIN — CEFEPIME 100 MILLIGRAM(S): 1 INJECTION, POWDER, FOR SOLUTION INTRAMUSCULAR; INTRAVENOUS at 21:56

## 2020-01-01 RX ADMIN — LACTULOSE 10 GRAM(S): 10 SOLUTION ORAL at 06:07

## 2020-01-01 RX ADMIN — PROPOFOL 4.25 MICROGRAM(S)/KG/MIN: 10 INJECTION, EMULSION INTRAVENOUS at 19:22

## 2020-01-01 RX ADMIN — Medication 100 MILLIEQUIVALENT(S): at 11:16

## 2020-01-01 RX ADMIN — CHLORHEXIDINE GLUCONATE 15 MILLILITER(S): 213 SOLUTION TOPICAL at 17:21

## 2020-01-01 RX ADMIN — MIDAZOLAM HYDROCHLORIDE 1.42 MG/KG/HR: 1 INJECTION, SOLUTION INTRAMUSCULAR; INTRAVENOUS at 08:00

## 2020-01-01 RX ADMIN — POTASSIUM PHOSPHATE, MONOBASIC POTASSIUM PHOSPHATE, DIBASIC 83.33 MILLIMOLE(S): 236; 224 INJECTION, SOLUTION INTRAVENOUS at 13:16

## 2020-01-01 RX ADMIN — Medication 250 MILLIGRAM(S): at 06:17

## 2020-01-01 RX ADMIN — CHLORHEXIDINE GLUCONATE 15 MILLILITER(S): 213 SOLUTION TOPICAL at 17:17

## 2020-01-01 RX ADMIN — CEFEPIME 100 MILLIGRAM(S): 1 INJECTION, POWDER, FOR SOLUTION INTRAMUSCULAR; INTRAVENOUS at 22:02

## 2020-01-01 RX ADMIN — CEFEPIME 100 MILLIGRAM(S): 1 INJECTION, POWDER, FOR SOLUTION INTRAMUSCULAR; INTRAVENOUS at 22:08

## 2020-01-01 RX ADMIN — Medication 650 MILLIGRAM(S): at 19:38

## 2020-01-01 RX ADMIN — LACTULOSE 10 GRAM(S): 10 SOLUTION ORAL at 18:10

## 2020-01-01 RX ADMIN — CEFEPIME 100 MILLIGRAM(S): 1 INJECTION, POWDER, FOR SOLUTION INTRAMUSCULAR; INTRAVENOUS at 21:00

## 2020-01-01 RX ADMIN — CEFEPIME 100 MILLIGRAM(S): 1 INJECTION, POWDER, FOR SOLUTION INTRAMUSCULAR; INTRAVENOUS at 13:54

## 2020-01-01 RX ADMIN — CEFEPIME 100 MILLIGRAM(S): 1 INJECTION, POWDER, FOR SOLUTION INTRAMUSCULAR; INTRAVENOUS at 22:14

## 2020-01-01 RX ADMIN — PROPOFOL 4.25 MICROGRAM(S)/KG/MIN: 10 INJECTION, EMULSION INTRAVENOUS at 07:59

## 2020-01-01 RX ADMIN — CEFEPIME 100 MILLIGRAM(S): 1 INJECTION, POWDER, FOR SOLUTION INTRAMUSCULAR; INTRAVENOUS at 05:05

## 2020-01-01 RX ADMIN — SENNA PLUS 1 TABLET(S): 8.6 TABLET ORAL at 17:23

## 2020-01-01 RX ADMIN — ENOXAPARIN SODIUM 70 MILLIGRAM(S): 100 INJECTION SUBCUTANEOUS at 05:09

## 2020-01-01 RX ADMIN — CHLORHEXIDINE GLUCONATE 1 APPLICATION(S): 213 SOLUTION TOPICAL at 04:35

## 2020-01-01 RX ADMIN — Medication 6.8 MICROGRAM(S)/KG/MIN: at 07:53

## 2020-01-01 RX ADMIN — CEFEPIME 100 MILLIGRAM(S): 1 INJECTION, POWDER, FOR SOLUTION INTRAMUSCULAR; INTRAVENOUS at 05:18

## 2020-01-01 RX ADMIN — MUPIROCIN 1 APPLICATION(S): 20 OINTMENT TOPICAL at 05:20

## 2020-01-01 RX ADMIN — LACTULOSE 10 GRAM(S): 10 SOLUTION ORAL at 17:22

## 2020-01-01 RX ADMIN — SODIUM CHLORIDE 1000 MILLILITER(S): 9 INJECTION INTRAMUSCULAR; INTRAVENOUS; SUBCUTANEOUS at 17:02

## 2020-01-01 RX ADMIN — Medication 40 MILLIEQUIVALENT(S): at 07:48

## 2020-01-01 RX ADMIN — Medication 40 MILLIGRAM(S): at 12:52

## 2020-01-01 RX ADMIN — ALBUTEROL 2 PUFF(S): 90 AEROSOL, METERED ORAL at 21:35

## 2020-01-01 RX ADMIN — ENOXAPARIN SODIUM 70 MILLIGRAM(S): 100 INJECTION SUBCUTANEOUS at 17:11

## 2020-01-01 RX ADMIN — Medication 650 MILLIGRAM(S): at 02:34

## 2020-01-01 RX ADMIN — MIDAZOLAM HYDROCHLORIDE 1.42 MG/KG/HR: 1 INJECTION, SOLUTION INTRAMUSCULAR; INTRAVENOUS at 22:09

## 2020-01-01 RX ADMIN — PANTOPRAZOLE SODIUM 40 MILLIGRAM(S): 20 TABLET, DELAYED RELEASE ORAL at 10:27

## 2020-01-01 RX ADMIN — Medication 250 MILLIGRAM(S): at 17:53

## 2020-01-01 RX ADMIN — Medication 3.32 MICROGRAM(S)/KG/MIN: at 08:00

## 2020-01-01 RX ADMIN — PROPOFOL 4.25 MICROGRAM(S)/KG/MIN: 10 INJECTION, EMULSION INTRAVENOUS at 18:06

## 2020-01-01 RX ADMIN — Medication 400 MILLIGRAM(S): at 16:07

## 2020-01-01 RX ADMIN — LACTULOSE 10 GRAM(S): 10 SOLUTION ORAL at 17:33

## 2020-01-01 RX ADMIN — ENOXAPARIN SODIUM 70 MILLIGRAM(S): 100 INJECTION SUBCUTANEOUS at 05:44

## 2020-01-01 RX ADMIN — Medication 3.32 MICROGRAM(S)/KG/MIN: at 09:06

## 2020-01-01 RX ADMIN — CEFEPIME 100 MILLIGRAM(S): 1 INJECTION, POWDER, FOR SOLUTION INTRAMUSCULAR; INTRAVENOUS at 20:13

## 2020-01-01 RX ADMIN — ENOXAPARIN SODIUM 70 MILLIGRAM(S): 100 INJECTION SUBCUTANEOUS at 05:20

## 2020-01-01 RX ADMIN — PROPOFOL 4.25 MICROGRAM(S)/KG/MIN: 10 INJECTION, EMULSION INTRAVENOUS at 13:05

## 2020-01-01 RX ADMIN — MIDAZOLAM HYDROCHLORIDE 1.42 MG/KG/HR: 1 INJECTION, SOLUTION INTRAMUSCULAR; INTRAVENOUS at 21:53

## 2020-01-01 RX ADMIN — ENOXAPARIN SODIUM 70 MILLIGRAM(S): 100 INJECTION SUBCUTANEOUS at 18:10

## 2020-01-01 RX ADMIN — CHLORHEXIDINE GLUCONATE 1 APPLICATION(S): 213 SOLUTION TOPICAL at 05:03

## 2020-01-01 RX ADMIN — SODIUM CHLORIDE 1000 MILLILITER(S): 9 INJECTION INTRAMUSCULAR; INTRAVENOUS; SUBCUTANEOUS at 14:18

## 2020-01-01 RX ADMIN — FENTANYL CITRATE 7.08 MICROGRAM(S)/KG/HR: 50 INJECTION INTRAVENOUS at 03:02

## 2020-01-01 RX ADMIN — Medication 3.32 MICROGRAM(S)/KG/MIN: at 07:45

## 2020-01-01 RX ADMIN — CHLORHEXIDINE GLUCONATE 15 MILLILITER(S): 213 SOLUTION TOPICAL at 07:17

## 2020-01-01 RX ADMIN — CHLORHEXIDINE GLUCONATE 1 APPLICATION(S): 213 SOLUTION TOPICAL at 06:03

## 2020-01-01 RX ADMIN — POTASSIUM PHOSPHATE, MONOBASIC POTASSIUM PHOSPHATE, DIBASIC 83.33 MILLIMOLE(S): 236; 224 INJECTION, SOLUTION INTRAVENOUS at 09:12

## 2020-01-01 RX ADMIN — Medication 650 MILLIGRAM(S): at 23:16

## 2020-01-01 RX ADMIN — CHLORHEXIDINE GLUCONATE 1 APPLICATION(S): 213 SOLUTION TOPICAL at 06:39

## 2020-01-01 RX ADMIN — Medication 100 MILLIEQUIVALENT(S): at 10:00

## 2020-01-01 RX ADMIN — CEFEPIME 100 MILLIGRAM(S): 1 INJECTION, POWDER, FOR SOLUTION INTRAMUSCULAR; INTRAVENOUS at 05:19

## 2020-01-01 RX ADMIN — MIDAZOLAM HYDROCHLORIDE 1.42 MG/KG/HR: 1 INJECTION, SOLUTION INTRAMUSCULAR; INTRAVENOUS at 12:02

## 2020-01-01 RX ADMIN — CHLORHEXIDINE GLUCONATE 1 APPLICATION(S): 213 SOLUTION TOPICAL at 05:24

## 2020-01-01 RX ADMIN — PROPOFOL 4.25 MICROGRAM(S)/KG/MIN: 10 INJECTION, EMULSION INTRAVENOUS at 09:57

## 2020-01-01 RX ADMIN — Medication 650 MILLIGRAM(S): at 02:40

## 2020-01-01 RX ADMIN — CHLORHEXIDINE GLUCONATE 1 APPLICATION(S): 213 SOLUTION TOPICAL at 05:42

## 2020-01-01 RX ADMIN — MUPIROCIN 1 APPLICATION(S): 20 OINTMENT TOPICAL at 05:01

## 2020-01-01 RX ADMIN — ENOXAPARIN SODIUM 70 MILLIGRAM(S): 100 INJECTION SUBCUTANEOUS at 16:19

## 2020-01-01 RX ADMIN — PROPOFOL 4.25 MICROGRAM(S)/KG/MIN: 10 INJECTION, EMULSION INTRAVENOUS at 20:23

## 2020-01-01 RX ADMIN — FENTANYL CITRATE 3.54 MICROGRAM(S)/KG/HR: 50 INJECTION INTRAVENOUS at 08:22

## 2020-01-01 RX ADMIN — PROPOFOL 4.25 MICROGRAM(S)/KG/MIN: 10 INJECTION, EMULSION INTRAVENOUS at 15:13

## 2020-01-01 RX ADMIN — Medication 100 MILLIGRAM(S): at 11:01

## 2020-01-01 RX ADMIN — CHLORHEXIDINE GLUCONATE 15 MILLILITER(S): 213 SOLUTION TOPICAL at 17:07

## 2020-01-01 RX ADMIN — SODIUM CHLORIDE 1000 MILLILITER(S): 9 INJECTION INTRAMUSCULAR; INTRAVENOUS; SUBCUTANEOUS at 15:03

## 2020-01-01 RX ADMIN — LACTULOSE 10 GRAM(S): 10 SOLUTION ORAL at 05:51

## 2020-01-01 RX ADMIN — Medication 6.8 MICROGRAM(S)/KG/MIN: at 07:45

## 2020-01-01 RX ADMIN — Medication 400 MILLIGRAM(S): at 20:45

## 2020-01-01 RX ADMIN — PROPOFOL 4.25 MICROGRAM(S)/KG/MIN: 10 INJECTION, EMULSION INTRAVENOUS at 08:00

## 2020-01-01 RX ADMIN — FENTANYL CITRATE 3.54 MICROGRAM(S)/KG/HR: 50 INJECTION INTRAVENOUS at 13:52

## 2020-01-01 RX ADMIN — BENZOCAINE AND MENTHOL 1 LOZENGE: 5; 1 LIQUID ORAL at 21:16

## 2020-01-01 RX ADMIN — PROPOFOL 4.25 MICROGRAM(S)/KG/MIN: 10 INJECTION, EMULSION INTRAVENOUS at 22:06

## 2020-01-01 RX ADMIN — CHLORHEXIDINE GLUCONATE 1 APPLICATION(S): 213 SOLUTION TOPICAL at 05:28

## 2020-01-01 RX ADMIN — MUPIROCIN 1 APPLICATION(S): 20 OINTMENT TOPICAL at 18:52

## 2020-01-01 RX ADMIN — ENOXAPARIN SODIUM 70 MILLIGRAM(S): 100 INJECTION SUBCUTANEOUS at 05:57

## 2020-01-01 RX ADMIN — MIDAZOLAM HYDROCHLORIDE 4 MILLIGRAM(S): 1 INJECTION, SOLUTION INTRAMUSCULAR; INTRAVENOUS at 20:49

## 2020-01-01 RX ADMIN — SENNA PLUS 1 TABLET(S): 8.6 TABLET ORAL at 17:13

## 2020-01-01 RX ADMIN — Medication 40 MILLIGRAM(S): at 17:09

## 2020-01-01 RX ADMIN — Medication 100 MILLIEQUIVALENT(S): at 11:09

## 2020-01-01 RX ADMIN — LACTULOSE 10 GRAM(S): 10 SOLUTION ORAL at 16:38

## 2020-01-01 RX ADMIN — Medication 400 MILLIGRAM(S): at 18:00

## 2020-01-01 RX ADMIN — ENOXAPARIN SODIUM 70 MILLIGRAM(S): 100 INJECTION SUBCUTANEOUS at 16:02

## 2020-01-01 RX ADMIN — Medication 100 MILLIEQUIVALENT(S): at 08:12

## 2020-01-01 RX ADMIN — Medication 3.32 MICROGRAM(S)/KG/MIN: at 20:21

## 2020-01-01 RX ADMIN — Medication 100 MILLIGRAM(S): at 08:07

## 2020-01-01 RX ADMIN — CEFEPIME 100 MILLIGRAM(S): 1 INJECTION, POWDER, FOR SOLUTION INTRAMUSCULAR; INTRAVENOUS at 13:52

## 2020-01-01 RX ADMIN — MUPIROCIN 1 APPLICATION(S): 20 OINTMENT TOPICAL at 11:32

## 2020-01-01 RX ADMIN — MIDAZOLAM HYDROCHLORIDE 1.42 MG/KG/HR: 1 INJECTION, SOLUTION INTRAMUSCULAR; INTRAVENOUS at 01:34

## 2020-01-01 RX ADMIN — Medication 50 MILLIGRAM(S): at 17:55

## 2020-01-01 RX ADMIN — Medication 3.32 MICROGRAM(S)/KG/MIN: at 03:39

## 2020-01-01 RX ADMIN — PANTOPRAZOLE SODIUM 40 MILLIGRAM(S): 20 TABLET, DELAYED RELEASE ORAL at 11:39

## 2020-01-01 RX ADMIN — MIDAZOLAM HYDROCHLORIDE 1.42 MG/KG/HR: 1 INJECTION, SOLUTION INTRAMUSCULAR; INTRAVENOUS at 12:00

## 2020-01-01 RX ADMIN — FENTANYL CITRATE 7.08 MICROGRAM(S)/KG/HR: 50 INJECTION INTRAVENOUS at 12:02

## 2020-01-01 RX ADMIN — HYDROMORPHONE HYDROCHLORIDE 2 MG/HR: 2 INJECTION INTRAMUSCULAR; INTRAVENOUS; SUBCUTANEOUS at 15:50

## 2020-01-01 RX ADMIN — PANTOPRAZOLE SODIUM 40 MILLIGRAM(S): 20 TABLET, DELAYED RELEASE ORAL at 09:44

## 2020-01-01 RX ADMIN — CHLORHEXIDINE GLUCONATE 1 APPLICATION(S): 213 SOLUTION TOPICAL at 05:02

## 2020-01-01 RX ADMIN — Medication 100 MILLIEQUIVALENT(S): at 11:00

## 2020-01-01 RX ADMIN — LACTULOSE 10 GRAM(S): 10 SOLUTION ORAL at 05:25

## 2020-01-01 RX ADMIN — SENNA PLUS 1 TABLET(S): 8.6 TABLET ORAL at 17:12

## 2020-01-01 RX ADMIN — PROPOFOL 4.25 MICROGRAM(S)/KG/MIN: 10 INJECTION, EMULSION INTRAVENOUS at 02:40

## 2020-01-01 RX ADMIN — POTASSIUM PHOSPHATE, MONOBASIC POTASSIUM PHOSPHATE, DIBASIC 62.5 MILLIMOLE(S): 236; 224 INJECTION, SOLUTION INTRAVENOUS at 16:39

## 2020-01-01 RX ADMIN — FENTANYL CITRATE 1 PATCH: 50 INJECTION INTRAVENOUS at 08:58

## 2020-01-01 RX ADMIN — PANTOPRAZOLE SODIUM 40 MILLIGRAM(S): 20 TABLET, DELAYED RELEASE ORAL at 12:08

## 2020-01-01 RX ADMIN — Medication 6.8 MICROGRAM(S)/KG/MIN: at 03:34

## 2020-01-01 RX ADMIN — Medication 100 MILLIEQUIVALENT(S): at 08:40

## 2020-01-01 RX ADMIN — ENOXAPARIN SODIUM 70 MILLIGRAM(S): 100 INJECTION SUBCUTANEOUS at 16:25

## 2020-01-01 RX ADMIN — CHLORHEXIDINE GLUCONATE 1 APPLICATION(S): 213 SOLUTION TOPICAL at 05:22

## 2020-01-01 RX ADMIN — LACTULOSE 10 GRAM(S): 10 SOLUTION ORAL at 05:27

## 2020-01-01 RX ADMIN — CEFEPIME 100 MILLIGRAM(S): 1 INJECTION, POWDER, FOR SOLUTION INTRAMUSCULAR; INTRAVENOUS at 05:50

## 2020-01-01 RX ADMIN — FENTANYL CITRATE 7.08 MICROGRAM(S)/KG/HR: 50 INJECTION INTRAVENOUS at 09:05

## 2020-01-01 RX ADMIN — Medication 400 MILLIGRAM(S): at 17:47

## 2020-01-01 RX ADMIN — Medication 250 MILLIGRAM(S): at 05:20

## 2020-01-01 RX ADMIN — CEFEPIME 100 MILLIGRAM(S): 1 INJECTION, POWDER, FOR SOLUTION INTRAMUSCULAR; INTRAVENOUS at 08:38

## 2020-01-01 RX ADMIN — PHENYLEPHRINE HYDROCHLORIDE 2.66 MICROGRAM(S)/KG/MIN: 10 INJECTION INTRAVENOUS at 05:56

## 2020-01-01 RX ADMIN — ENOXAPARIN SODIUM 70 MILLIGRAM(S): 100 INJECTION SUBCUTANEOUS at 04:27

## 2020-01-01 RX ADMIN — ENOXAPARIN SODIUM 70 MILLIGRAM(S): 100 INJECTION SUBCUTANEOUS at 17:23

## 2020-01-01 RX ADMIN — Medication 100 MILLIEQUIVALENT(S): at 10:41

## 2020-01-01 RX ADMIN — LACTULOSE 10 GRAM(S): 10 SOLUTION ORAL at 17:13

## 2020-01-01 RX ADMIN — CHLORHEXIDINE GLUCONATE 15 MILLILITER(S): 213 SOLUTION TOPICAL at 04:33

## 2020-01-01 RX ADMIN — POLYETHYLENE GLYCOL 3350 17 GRAM(S): 17 POWDER, FOR SOLUTION ORAL at 20:21

## 2020-01-01 RX ADMIN — CEFEPIME 100 MILLIGRAM(S): 1 INJECTION, POWDER, FOR SOLUTION INTRAMUSCULAR; INTRAVENOUS at 12:20

## 2020-01-01 RX ADMIN — HYDROMORPHONE HYDROCHLORIDE 4 MG/HR: 2 INJECTION INTRAMUSCULAR; INTRAVENOUS; SUBCUTANEOUS at 11:29

## 2020-01-01 RX ADMIN — CHLORHEXIDINE GLUCONATE 15 MILLILITER(S): 213 SOLUTION TOPICAL at 05:28

## 2020-01-01 RX ADMIN — LACTULOSE 10 GRAM(S): 10 SOLUTION ORAL at 05:28

## 2020-01-01 RX ADMIN — CHLORHEXIDINE GLUCONATE 15 MILLILITER(S): 213 SOLUTION TOPICAL at 06:19

## 2020-01-01 RX ADMIN — Medication 3.32 MICROGRAM(S)/KG/MIN: at 00:45

## 2020-01-01 RX ADMIN — LACTULOSE 10 GRAM(S): 10 SOLUTION ORAL at 06:17

## 2020-01-01 RX ADMIN — FENTANYL CITRATE 25 MICROGRAM(S): 50 INJECTION INTRAVENOUS at 13:52

## 2020-01-01 RX ADMIN — PROPOFOL 4.25 MICROGRAM(S)/KG/MIN: 10 INJECTION, EMULSION INTRAVENOUS at 01:49

## 2020-01-01 RX ADMIN — CHLORHEXIDINE GLUCONATE 15 MILLILITER(S): 213 SOLUTION TOPICAL at 05:58

## 2020-01-01 RX ADMIN — ENOXAPARIN SODIUM 70 MILLIGRAM(S): 100 INJECTION SUBCUTANEOUS at 17:17

## 2020-01-01 RX ADMIN — CEFEPIME 100 MILLIGRAM(S): 1 INJECTION, POWDER, FOR SOLUTION INTRAMUSCULAR; INTRAVENOUS at 13:18

## 2020-01-01 RX ADMIN — PROPOFOL 4.25 MICROGRAM(S)/KG/MIN: 10 INJECTION, EMULSION INTRAVENOUS at 17:01

## 2020-01-01 RX ADMIN — Medication 100 MILLIEQUIVALENT(S): at 09:00

## 2020-01-01 RX ADMIN — CHLORHEXIDINE GLUCONATE 15 MILLILITER(S): 213 SOLUTION TOPICAL at 05:19

## 2020-01-01 RX ADMIN — Medication 100 MILLIGRAM(S): at 06:47

## 2020-01-01 RX ADMIN — PHENYLEPHRINE HYDROCHLORIDE 2.66 MICROGRAM(S)/KG/MIN: 10 INJECTION INTRAVENOUS at 02:35

## 2020-01-01 RX ADMIN — PROPOFOL 4.25 MICROGRAM(S)/KG/MIN: 10 INJECTION, EMULSION INTRAVENOUS at 21:38

## 2020-01-01 RX ADMIN — HYDROMORPHONE HYDROCHLORIDE 1 MG/HR: 2 INJECTION INTRAMUSCULAR; INTRAVENOUS; SUBCUTANEOUS at 10:13

## 2020-01-01 RX ADMIN — PROPOFOL 4.25 MICROGRAM(S)/KG/MIN: 10 INJECTION, EMULSION INTRAVENOUS at 05:56

## 2020-01-01 RX ADMIN — MIDAZOLAM HYDROCHLORIDE 2 MILLIGRAM(S): 1 INJECTION, SOLUTION INTRAMUSCULAR; INTRAVENOUS at 13:32

## 2020-01-01 RX ADMIN — CEFEPIME 100 MILLIGRAM(S): 1 INJECTION, POWDER, FOR SOLUTION INTRAMUSCULAR; INTRAVENOUS at 06:18

## 2020-01-01 RX ADMIN — Medication 250 MILLIGRAM(S): at 17:03

## 2020-01-01 RX ADMIN — Medication 40 MILLIEQUIVALENT(S): at 15:25

## 2020-01-01 RX ADMIN — SENNA PLUS 1 TABLET(S): 8.6 TABLET ORAL at 17:01

## 2020-01-01 RX ADMIN — PANTOPRAZOLE SODIUM 40 MILLIGRAM(S): 20 TABLET, DELAYED RELEASE ORAL at 08:07

## 2020-01-01 RX ADMIN — PROPOFOL 4.25 MICROGRAM(S)/KG/MIN: 10 INJECTION, EMULSION INTRAVENOUS at 14:09

## 2020-01-01 RX ADMIN — SENNA PLUS 1 TABLET(S): 8.6 TABLET ORAL at 05:52

## 2020-01-01 RX ADMIN — FENTANYL CITRATE 1 PATCH: 50 INJECTION INTRAVENOUS at 22:53

## 2020-01-01 RX ADMIN — Medication 3.32 MICROGRAM(S)/KG/MIN: at 22:00

## 2020-01-01 RX ADMIN — Medication 100 MILLIGRAM(S): at 04:05

## 2020-01-01 RX ADMIN — Medication 400 MILLIGRAM(S): at 00:15

## 2020-01-01 RX ADMIN — Medication 85 MILLIMOLE(S): at 09:55

## 2020-04-10 NOTE — ED PROVIDER NOTE - OBJECTIVE STATEMENT
50 yo male with shortness of breath x 1 day.  Pt had fever 1 weeks ago.  Denies htn hchol dm smoking cocaine.  He has mild chest pain which is CP nonradiationg, not associated with inspiration, position, exertion or food consumption.  Pt denies nausea, vomiting, shortness of breath, weakness, fatigue, lightheadedness.  Pt has had no recent long trips, surgery, trauma, denies hemoptysis, and has no hx of DVT/PE. PERC 0.  Pt also denies fevers, chills cough.

## 2020-04-10 NOTE — ED PROVIDER NOTE - NSFOLLOWUPINSTRUCTIONS_ED_ALL_ED_FT
********************You probably have Coronavirus****************************    YOU WILL BE OK.  You will have a few very uncomfortable days/weeks but your symptoms will passs and you will get better.      Please do not FEAR.   As long as you do not have Severe shortness of breath you will be OK.      PLEASE RETURN TO THE ER IMMEDIATELY IF YOU HAVE WORSENING SHORTNESS OF BREATH, CONFUSION or YOUR LIPS/FACE TURN BLUE    Follow the below instructions to help you get through the symptoms and help yourself feel better in the next few weeks.    If we Swabbed you for COVID-19, your results will take 4-7 days to return and we will send you a text/email. Please do not call us for the results.       1. STAY HOME for at least 14 DAYS.  Wait until you have NO SYMPTOMS FOR 3 days before you leave your house.    2. Stay 6 feet away from everyone in your life, especially the elderly.      3. ALWAYS WEAR A MASK, even at home, to protect your family.    4.  Sleep in a seperate bed from anyone who do not have symptoms.      5.  If you have elderly in your home, try to find them (or yourself) another place to stay.      6. Wash your hands every time you touch something and EVERY HOUR for 20 seconds each time.      7. DRINK HALF Liter of Pedialyte or Sugarfree Gatorade per day x at least 5 days for fatigue, lightheadedness, and body aches.  If you have mild shortness of breath, then have very little fluids.    8.  Take TYLENOL 650 mg every 6 hours for fever, body pain or headache.  Take Ibuprofen 600 mg if your pain or fever continues despite fluids and Tylenol.       9. For trouble breathing and/or trouble sleeping, LAY ON YOUR STOMACH for ten minutes every hour.  This opens up your lungs in the back.  Also try to SLEEP ON YOUR STOMACH all night, with some pillows to elevate your head, if possible.     10. Use over the counter Cough syrup and/or an inhaler for mild shortness of breath and cough.    11. Some supplements that have been recommended to help boost your immune system include Omega 3, Zinc, Magnesium, Vitamin B12, C, D, E, Tumeric and Elderberry.      12. RETURN TO THE ER IMMEDIATELY IF YOU HAVE WORSENING SHORTNESS OF BREATH, CONFUSION or YOUR LIPS/FACE TURN BLUE

## 2020-04-15 NOTE — H&P ADULT - PROBLEM SELECTOR PLAN 1
Patient is admitted with acute respiratory failure secondary to COVID-19 infection requiring 3L NC saturating 96 percent.   Will monitor respiratory status.   Will start on hydroxychlorquine protocol.  COVID 19 here today is negative.  inflamatory markers are elevated.  will f/u crp, d-dimer, ferritin ,esr , ldh in case patient needs anakinra. Patient is admitted with acute respiratory failure secondary to COVID-19 infection requiring 3L NC saturating 96 percent.   Will monitor respiratory status.   Will start on hydroxychlorquine protocol.  COVID 19 here today is negative.  inflamatory markers are elevated.  will f/u crp, d-dimer, ferritin ,esr , ldh in case patient needs anakinra.  -For cough will start robutussin PRN

## 2020-04-15 NOTE — H&P ADULT - HISTORY OF PRESENT ILLNESS
49 years old Male patient from home, who is known COVID positive from 04/10/2019 w/ no significant PMHx or PSHx presents to the ED with fever and headache. Patient reports he has been sick for the past x2 weeks with SOB. Patient adds he is COVID-19 positive and endorses generalized weakness. Patient came to ED 5 days ago with chest pain and shortness of breath and was tested for COVID 19. Patient was saturating fine and was discharged from ED. Patient denies any other complaints.  Patient denies any chest pain, palpitations, abdominal pain, nausea, vomiting abdominal pain , change in urinary or bowel habits.  Patient is admitted with acute respiratory failure secondary to COVID-19 infection requiring 3L NC saturating 96 percent. Will monitor respiratory status. COVID 19 here today is negative.

## 2020-04-15 NOTE — H&P ADULT - ASSESSMENT
49 years old Male patient from home, who is known COVID positive from 04/10/2019 w/ no significant PMHx or PSHx presents to the ED with fever and headache. Patient reports he has been sick for the past x2 weeks with SOB. Patient adds he is COVID-19 positive and endorses generalized weakness. Patient came to ED 5 days ago with chest pain and shortness of breath and was tested for COVID 19. Patient was saturating fine and was discharged from ED. Patient denies any other complaints.  Patient denies any chest pain, palpitations, abdominal pain, nausea, vomiting abdominal pain , change in urinary or bowel habits.  Patient is admitted with acute respiratory failure secondary to COVID-19 infection requiring 3L NC saturating 96 percent. Will monitor respiratory status. COVID 19 here today is negative. 49 years old Male patient from home, who is known COVID positive from 04/10/2019 w/ no significant PMHx or PSHx presents to the ED with fever and headache. Patient reports he has been sick for the past x2 weeks with SOB. Patient adds he is COVID-19 positive and endorses generalized weakness. Patient came to ED 5 days ago with chest pain and shortness of breath and was tested for COVID 19. Patient was saturating fine and was discharged from ED. Patient denies any other complaints.  Patient denies any chest pain, palpitations, abdominal pain, nausea, vomiting abdominal pain , change in urinary or bowel habits.  Patient is admitted with acute respiratory failure secondary to COVID-19 infection requiring 3L NC saturating 96 percent. Will monitor respiratory status. COVID 19 here today is negative. Pt profusely coughing.

## 2020-04-15 NOTE — ED PROVIDER NOTE - PATIENT PORTAL LINK FT
You can access the FollowMyHealth Patient Portal offered by [facility] by registering at the following website: [email or web address]. By joining [facility]’s FollowMyHealth portal, you will also be able to view your health information using other applications (apps) compatible with our system.

## 2020-04-15 NOTE — H&P ADULT - NSHPPHYSICALEXAM_GEN_ALL_CORE
Vital Signs Last 24 Hrs  T(C): 37.3 (15 Apr 2020 13:15), Max: 37.3 (15 Apr 2020 13:15)  T(F): 99.1 (15 Apr 2020 13:15), Max: 99.1 (15 Apr 2020 13:15)  HR: 95 (15 Apr 2020 15:02) (95 - 106)  BP: 117/70 (15 Apr 2020 15:02) (117/70 - 132/74)  BP(mean): --  RR: 22 (15 Apr 2020 15:02) (22 - 22)  SpO2: 96% (15 Apr 2020 15:02) (86% - 96%)

## 2020-04-15 NOTE — ED PROVIDER NOTE - OBJECTIVE STATEMENT
48 y/o M patient w/ no significant PMHx presents to the ED with fever and headache. Patient reports he has been sick for the past x2 weeks with SOB. Patient adds he is COVID-19 positive and endorses generalized weakness. Patient denies any other complaints. NKDA.

## 2020-04-15 NOTE — ED ADULT NURSE NOTE - NSIMPLEMENTINTERV_GEN_ALL_ED
Implemented All Universal Safety Interventions:  [place] to call system. Call bell, personal items and telephone within reach. Instruct patient to call for assistance. Room bathroom lighting operational. Non-slip footwear when patient is off stretcher. Physically safe environment: no spills, clutter or unnecessary equipment. Stretcher in lowest position, wheels locked, appropriate side rails in place.

## 2020-04-16 NOTE — PROGRESS NOTE ADULT - ASSESSMENT
In ED pt with SaO2 86% rm air, and 96% 3L NC  Impression and Plan:  1. Acute hypoxemic respiratory failure likely due to viral pneumonia r/t COVID-19 and possible superimposed bacterial infection  2.  Hypoalbuminemia  3.  Abnormal Liver function tests due to acute viral illness    - continue hydroxychloroquine for COVID related illness  - continue to monitor closely  - continue supplemental oxygen as need  - Supportive measures  - Tylenol PRN for fever  -  Robitussin as needed for cough  - Continue Isolation precautions based on COVID-19 infection control protocol  - f/u immunology labs and for consideration of TOCI vs anakinra

## 2020-04-17 NOTE — PROGRESS NOTE ADULT - ASSESSMENT
Impression and Plan:  1. Acute hypoxemic respiratory failure likely due to viral pneumonia r/t COVID-19 and possible superimposed bacterial infection  2.  Hypoalbuminemia  3.  Abnormal Liver function tests due to acute viral illness    - still with high O2 demand  - will dose with TOCI for COVID related illness (Ferritin, Serum: 1999 ng/mL 04.15.20,  Ferritin, Serum: 2213:  (04.17.20 @ 09:47)  - start solumedrol 40mg Q12 hrs  - continue hydroxychloroquine for COVID related illness  - continue to monitor closely  - continue supplemental oxygen as need to keep SaO2 >93% (titrate down as tolerated)  - Supportive measures  - Tylenol PRN for fever  -  Robitussin as needed for cough  - Continue Isolation precautions based on COVID-19 infection control protocol  - monitor immunology labs for at least 3 more days given dosing of TOCI  - Plan d/w attending  patient agreed with discussed plan of care  pt's family (Deena) called and made aware of pt's status and plan of care Impression and Plan:  1. Acute hypoxemic respiratory failure likely due to viral pneumonia r/t COVID-19   2.  Hypoalbuminemia  3.  Abnormal Liver function tests due to acute viral illness    - still with high O2 demand  - will dose with TOCI for COVID related illness (Ferritin, Serum: 1999 ng/mL 04.15.20,  Ferritin, Serum: 2213:  (04.17.20 @ 09:47)  - start solumedrol 40mg Q12 hrs  - continue hydroxychloroquine for COVID related illness  - continue to monitor closely  - continue supplemental oxygen as need to keep SaO2 >93% (titrate down as tolerated)  - Supportive measures  - Tylenol PRN for fever  -  Robitussin as needed for cough  - Continue Isolation precautions based on COVID-19 infection control protocol  - monitor immunology labs for at least 3 more days given dosing of TOCI  - Plan d/w attending  patient agreed with discussed plan of care  pt's family (Deena) called and made aware of pt's status and plan of care

## 2020-04-17 NOTE — PROGRESS NOTE ADULT - SUBJECTIVE AND OBJECTIVE BOX
Chart reviewed.  Patient seen and examined.    CC: Patient is a 49y old  Male who presents with a chief complaint of fever cough (15 Apr 2020 18:47)    HPI: 49 years old Male patient from home, who is known COVID positive from 04/10/2019 w/ no significant PMHx or PSHx presents to the ED with fever and headache. Patient reports he has been sick for the past x2 weeks with SOB. Patient adds he is COVID-19 positive and endorses generalized weakness. Patient came to ED 5 days ago with chest pain and shortness of breath and was tested for COVID 19. Patient was saturating fine and was discharged from ED. Patient denies any other complaints.  Patient denies any chest pain, palpitations, abdominal pain, nausea, vomiting abdominal pain , change in urinary or bowel habits.  Patient is admitted with acute respiratory failure secondary to COVID-19 infection requiring 3L NC saturating 96 percent. Will monitor respiratory status. COVID 19 here today is negative. (15 Apr 2020 18:47)    MEDICATIONS  (STANDING):  enoxaparin Injectable 40 milliGRAM(s) SubCutaneous daily  hydroxychloroquine   Oral   hydroxychloroquine 400 milliGRAM(s) Oral every 24 hours    MEDICATIONS  (PRN):  acetaminophen   Tablet .. 650 milliGRAM(s) Oral every 6 hours PRN Temp greater or equal to 38C (100.4F), Mild Pain (1 - 3)  benzocaine 15 mG/menthol 3.6 mG (Sugar-Free) Lozenge 1 Lozenge Oral every 4 hours PRN Sore Throat  guaiFENesin   Syrup  (Sugar-Free) 100 milliGRAM(s) Oral every 6 hours PRN Cough      Vital Signs Last 24 Hrs  T(C): 36.9 (17 Apr 2020 11:11), Max: 36.9 (16 Apr 2020 18:09)  T(F): 98.4 (17 Apr 2020 11:11), Max: 98.5 (16 Apr 2020 18:09)  HR: 98 (17 Apr 2020 11:11) (86 - 100)  BP: 111/67 (17 Apr 2020 11:11) (111/67 - 125/79)  BP(mean): --  RR: 18 (17 Apr 2020 11:11) (14 - 24)  SpO2: 93% (17 Apr 2020 11:11) (93% - 100%)    PHYSICAL EXAM:  GENERAL: NAD  Head: AT/NC  eyes: nonicteric  Mouth: dry membranes  RESP: even and unlabored breathing with NRM, attempted to wean off NRM to NC however he desat to 90 w/ SOB and transitioned back to NRM @15L then later titrated down to 10L but he did not respond well with the 10L had some wheezing.  BREAST:  not examined  NEURO: AAOX3, follows all commands, able to move extremities spontaneously    LABS                      12.6   10.83 )-----------( 472      ( 17 Apr 2020 06:59 )             38.4     04-17    137  |  99  |  11  ----------------------------<  73  3.6   |  30  |  0.60    Ca    8.5      17 Apr 2020 06:59  Phos  3.6     04-17  Mg     2.6     04-17    TPro  7.0  /  Alb  2.3<L>  /  TBili  1.1  /  DBili  x   /  AST  116<H>  /  ALT  197<H>  /  AlkPhos  291<H>  04-17  LIVER FUNCTIONS - ( 17 Apr 2020 06:59 )  Alb: 2.3 g/dL / Pro: 7.0 g/dL / ALK PHOS: 291 U/L / ALT: 197 U/L DA / AST: 116 U/L / GGT: x                             12.9   11.35 )-----------( 381      ( 16 Apr 2020 07:47 )             38.7     04-16    136  |  100  |  9   ----------------------------<  80  3.5   |  27  |  0.68    Ca    8.2<L>      16 Apr 2020 07:47  Phos  3.2     04-16  Mg     2.4     04-16    TPro  6.9  /  Alb  2.4<L>  /  TBili  1.1  /  DBili  0.5<H>  /  AST  135<H>  /  ALT  203<H>  /  AlkPhos  259<H>  04-16    LIVER FUNCTIONS - ( 16 Apr 2020 07:47 )  Alb: 2.4 g/dL / Pro: 6.9 g/dL / ALK PHOS: 259 U/L / ALT: 203 U/L DA / AST: 135 U/L / GGT: x           COVID-19 PCR: NotDetec (15 Apr 2020 14:25)    COVID-19 PCR: Detected: This test has been validated by [employer] to be accurate;  though it has not been FDA cleared/approved by the usual pathway.   (04.10.20 @ 21:35)    < from: Xray Chest 1 View-PORTABLE IMMEDIATE (04.15.20 @ 14:12) >  Findings:  Peripheral subpleural groundglass interstitial infiltrate at the left lung base. Peripheral interstitial infiltrate in the right midlung. No evidence of pleural effusion or pneumothorax. The heart is not enlarged. No hilar or mediastinal abnormality. No significant osseous abnormality..    Impression:  Findings are consistent with but not diagnostic of COVID pneumonia..    < end of copied text > Chart reviewed.  Patient seen and examined.    CC: Patient is a 49y old  Male who presents with a chief complaint of fever cough (15 Apr 2020 18:47)    HPI: 49 years old Male patient from home, who is known COVID positive from 04/10/2019 w/ no significant PMHx or PSHx presents to the ED with fever and headache. Patient reports he has been sick for the past x2 weeks with SOB. Patient adds he is COVID-19 positive and endorses generalized weakness. Patient came to ED 5 days ago with chest pain and shortness of breath and was tested for COVID 19. Patient was saturating fine and was discharged from ED. Patient denies any other complaints.  Patient denies any chest pain, palpitations, abdominal pain, nausea, vomiting abdominal pain , change in urinary or bowel habits.  Patient is admitted with acute respiratory failure secondary to COVID-19 infection requiring 3L NC saturating 96 percent. Will monitor respiratory status. COVID 19 here today is negative. (15 Apr 2020 18:47)    MEDICATIONS  (STANDING):  enoxaparin Injectable 40 milliGRAM(s) SubCutaneous daily  hydroxychloroquine   Oral   hydroxychloroquine 400 milliGRAM(s) Oral every 24 hours    MEDICATIONS  (PRN):  acetaminophen   Tablet .. 650 milliGRAM(s) Oral every 6 hours PRN Temp greater or equal to 38C (100.4F), Mild Pain (1 - 3)  benzocaine 15 mG/menthol 3.6 mG (Sugar-Free) Lozenge 1 Lozenge Oral every 4 hours PRN Sore Throat  guaiFENesin   Syrup  (Sugar-Free) 100 milliGRAM(s) Oral every 6 hours PRN Cough      Vital Signs Last 24 Hrs  T(C): 36.9 (17 Apr 2020 11:11), Max: 36.9 (16 Apr 2020 18:09)  T(F): 98.4 (17 Apr 2020 11:11), Max: 98.5 (16 Apr 2020 18:09)  HR: 98 (17 Apr 2020 11:11) (86 - 100)  BP: 111/67 (17 Apr 2020 11:11) (111/67 - 125/79)  BP(mean): --  RR: 18 (17 Apr 2020 11:11) (14 - 24)  SpO2: 93% (17 Apr 2020 11:11) (93% - 100%)    PHYSICAL EXAM:  GENERAL: NAD  Head: AT/NC  eyes: nonicteric  Mouth: dry membranes  RESP: even and unlabored breathing with NRM, attempted to wean off NRM to NC however he desat to 90 w/ SOB and transitioned back to NRM @15L then later titrated down to 10L but he did not respond well with the 10L had some wheezing.  BREAST:  not examined  NEURO: AAOX3, follows all commands, able to move extremities spontaneously    LABS                      12.6   10.83 )-----------( 472      ( 17 Apr 2020 06:59 )             38.4     04-17    137  |  99  |  11  ----------------------------<  73  3.6   |  30  |  0.60    Ca    8.5      17 Apr 2020 06:59  Phos  3.6     04-17  Mg     2.6     04-17    TPro  7.0  /  Alb  2.3<L>  /  TBili  1.1  /  DBili  x   /  AST  116<H>  /  ALT  197<H>  /  AlkPhos  291<H>  04-17  LIVER FUNCTIONS - ( 17 Apr 2020 06:59 )  Alb: 2.3 g/dL / Pro: 7.0 g/dL / ALK PHOS: 291 U/L / ALT: 197 U/L DA / AST: 116 U/L / GGT: x                             12.9   11.35 )-----------( 381      ( 16 Apr 2020 07:47 )             38.7     04-16    136  |  100  |  9   ----------------------------<  80  3.5   |  27  |  0.68    Ca    8.2<L>      16 Apr 2020 07:47  Phos  3.2     04-16  Mg     2.4     04-16    TPro  6.9  /  Alb  2.4<L>  /  TBili  1.1  /  DBili  0.5<H>  /  AST  135<H>  /  ALT  203<H>  /  AlkPhos  259<H>  04-16    LIVER FUNCTIONS - ( 16 Apr 2020 07:47 )  Alb: 2.4 g/dL / Pro: 6.9 g/dL / ALK PHOS: 259 U/L / ALT: 203 U/L DA / AST: 135 U/L / GGT: x           COVID-19 PCR: NotDetec (15 Apr 2020 14:25)    COVID-19 PCR: Detected: This test has been validated by "[employer] to be accurate;  though it has not been FDA cleared/approved by the usual pathway.   (04.10.20 @ 21:35)    < from: Xray Chest 1 View-PORTABLE IMMEDIATE (04.15.20 @ 14:12) >  Findings:  Peripheral subpleural groundglass interstitial infiltrate at the left lung base. Peripheral interstitial infiltrate in the right midlung. No evidence of pleural effusion or pneumothorax. The heart is not enlarged. No hilar or mediastinal abnormality. No significant osseous abnormality..    Impression:  Findings are consistent with but not diagnostic of COVID pneumonia..    < end of copied text >

## 2020-04-18 NOTE — PROGRESS NOTE ADULT - ASSESSMENT
Impression and Plan:  1. Acute hypoxemic respiratory failure likely due to viral pneumonia r/t COVID-19   2.  Hypoalbuminemia  3.  Abnormal Liver function tests due to acute viral illness    -- cont oxygen suppl, plaquenil , solumedrol, pulm f/u

## 2020-04-18 NOTE — PROGRESS NOTE ADULT - SUBJECTIVE AND OBJECTIVE BOX
pt seen and examined    MEDICATIONS  (STANDING):  enoxaparin Injectable 40 milliGRAM(s) SubCutaneous daily  hydroxychloroquine   Oral   hydroxychloroquine 400 milliGRAM(s) Oral every 24 hours  methylPREDNISolone sodium succinate Injectable 40 milliGRAM(s) IV Push every 12 hours    MEDICATIONS  (PRN):  acetaminophen   Tablet .. 650 milliGRAM(s) Oral every 6 hours PRN Temp greater or equal to 38C (100.4F), Mild Pain (1 - 3)  benzocaine 15 mG/menthol 3.6 mG (Sugar-Free) Lozenge 1 Lozenge Oral every 4 hours PRN Sore Throat  guaiFENesin   Syrup  (Sugar-Free) 100 milliGRAM(s) Oral every 6 hours PRN Cough    Vital Signs Last 24 Hrs  T(C): 36.9 (18 Apr 2020 13:00), Max: 37.1 (18 Apr 2020 05:38)  T(F): 98.5 (18 Apr 2020 13:00), Max: 98.7 (18 Apr 2020 05:38)  HR: 86 (18 Apr 2020 13:00) (81 - 86)  BP: 103/64 (18 Apr 2020 13:00) (103/64 - 106/63)  BP(mean): 77 (18 Apr 2020 13:00) (77 - 77)  RR: 22 (18 Apr 2020 13:00) (16 - 22)  SpO2: 90% (18 Apr 2020 13:00) (90% - 93%)      PHYSICAL EXAM:  GENERAL: NAD  cvs- s1, s2+  RS - dec breath sounds at bases   abd - soft , bs+  ext - no edema  NEURO: AAOX3, follows all commands, able to move extremities spontaneously    LABS:  04-18    136  |  99  |  19<H>  ----------------------------<  102<H>  3.9   |  29  |  0.63    Ca    8.6      18 Apr 2020 07:29  Phos  4.5     04-18  Mg     2.8     04-18    TPro  6.9  /  Alb  2.2<L>  /  TBili  0.8  /  DBili      /  AST  65<H>  /  ALT  149<H>  /  AlkPhos  297<H>  04-18    Creatinine Trend: 0.63 <--, 0.60 <--, 0.68 <--, 0.69 <--                        12.6   6.61  )-----------( 506      ( 18 Apr 2020 07:29 )             37.3     Urine Studies:            LIVER FUNCTIONS - ( 18 Apr 2020 07:29 )  Alb: 2.2 g/dL / Pro: 6.9 g/dL / ALK PHOS: 297 U/L / ALT: 149 U/L DA / AST: 65 U/L / GGT: x             Findings:  Peripheral subpleural groundglass interstitial infiltrate at the left lung base. Peripheral interstitial infiltrate in the right midlung. No evidence of pleural effusion or pneumothorax. The heart is not enlarged. No hilar or mediastinal abnormality. No significant osseous abnormality..    Impression:  Findings are consistent with but not diagnostic of COVID pneumonia..    < end of copied text >

## 2020-04-19 NOTE — PROGRESS NOTE ADULT - SUBJECTIVE AND OBJECTIVE BOX
pt seen and examined    MEDICATIONS  (STANDING):  enoxaparin Injectable 40 milliGRAM(s) SubCutaneous daily  hydroxychloroquine   Oral   hydroxychloroquine 400 milliGRAM(s) Oral every 24 hours  methylPREDNISolone sodium succinate Injectable 40 milliGRAM(s) IV Push every 12 hours    MEDICATIONS  (PRN):  acetaminophen   Tablet .. 650 milliGRAM(s) Oral every 6 hours PRN Temp greater or equal to 38C (100.4F), Mild Pain (1 - 3)  benzocaine 15 mG/menthol 3.6 mG (Sugar-Free) Lozenge 1 Lozenge Oral every 4 hours PRN Sore Throat  guaiFENesin   Syrup  (Sugar-Free) 100 milliGRAM(s) Oral every 6 hours PRN Cough    Vital Signs Last 24 Hrs  T(C): 36.6 (19 Apr 2020 18:00), Max: 36.8 (19 Apr 2020 01:00)  T(F): 97.9 (19 Apr 2020 18:00), Max: 98.2 (19 Apr 2020 01:00)  HR: 82 (19 Apr 2020 18:00) (79 - 84)  BP: 120/74 (19 Apr 2020 18:00) (103/61 - 120/74)  BP(mean): --  RR: 19 (19 Apr 2020 18:00) (19 - 22)  SpO2: 96% (19 Apr 2020 18:00) (91% - 96%)    PHYSICAL EXAM:  GENERAL: NAD  cvs- s1, s2+  RS - dec breath sounds at bases   abd - soft , bs+  ext - no edema  NEURO: AAOX3, follows all commands, able to move extremities spontaneously    LABS:  04-19    136  |  101  |  21<H>  ----------------------------<  199<H>  4.5   |  27  |  0.68    Ca    8.2<L>      19 Apr 2020 13:08  Phos  3.2     04-19  Mg     2.5     04-19    TPro  6.6  /  Alb  2.2<L>  /  TBili  0.6  /  DBili      /  AST  67<H>  /  ALT  145<H>  /  AlkPhos  272<H>  04-19    Creatinine Trend: 0.68 <--, 0.63 <--, 0.60 <--, 0.68 <--, 0.69 <--                        12.6   9.89  )-----------( 622      ( 19 Apr 2020 13:08 )             38.5     Urine Studies:            LIVER FUNCTIONS - ( 19 Apr 2020 13:08 )  Alb: 2.2 g/dL / Pro: 6.6 g/dL / ALK PHOS: 272 U/L / ALT: 145 U/L DA / AST: 67 U/L / GGT: x                 LIVER FUNCTIONS - ( 18 Apr 2020 07:29 )  Alb: 2.2 g/dL / Pro: 6.9 g/dL / ALK PHOS: 297 U/L / ALT: 149 U/L DA / AST: 65 U/L / GGT: x             Findings:  Peripheral subpleural groundglass interstitial infiltrate at the left lung base. Peripheral interstitial infiltrate in the right midlung. No evidence of pleural effusion or pneumothorax. The heart is not enlarged. No hilar or mediastinal abnormality. No significant osseous abnormality..    Impression:  Findings are consistent with but not diagnostic of COVID pneumonia..    < end of copied text >

## 2020-04-20 NOTE — DIETITIAN INITIAL EVALUATION ADULT. - PERTINENT LABORATORY DATA
04-19 Na136 mmol/L Glu 199 mg/dL<H> K+ 4.5 mmol/L Cr  0.68 mg/dL BUN 21 mg/dL<H> 04-19 Phos 3.2 mg/dL 04-19 Alb 2.2 g/dL<L> 04-16 Chol 128 mg/dL LDL 78 mg/dL HDL 24 mg/dL<L> Trig 130 mg/dL

## 2020-04-20 NOTE — CONSULT NOTE ADULT - SUBJECTIVE AND OBJECTIVE BOX
Patient is a 49y old  Male who presents with a chief complaint of fever cough (20 Apr 2020 09:52)      Initial HPI on admission: 49 years old Male patient from home, who is known COVID positive from 04/10/2019 w/ no significant PMHx or PSHx presents to the ED with fever and headache. Patient reports he has been sick for the past x2 weeks with SOB. Patient adds he is COVID-19 positive and endorses generalized weakness. Patient came to ED 5 days ago with chest pain and shortness of breath and was tested for COVID 19. Patient was saturating fine and was discharged from ED. Patient denies any other complaints.  Patient denies any chest pain, palpitations, abdominal pain, nausea, vomiting abdominal pain , change in urinary or bowel habits.  Patient is admitted with acute respiratory failure secondary to COVID-19 infection requiring 3L NC saturating 96 percent. Will monitor respiratory status. COVID 19 here today is negative. (15 Apr 2020 18:47)      BRIEF HOSPITAL COURSE: ***    PAST MEDICAL & SURGICAL HISTORY:  No pertinent past medical history  No significant past surgical history    Allergies    No Known Allergies    Intolerances      FAMILY HISTORY:          Medications:  acetaminophen   Tablet .. 650 milliGRAM(s) Oral every 6 hours PRN  benzocaine 15 mG/menthol 3.6 mG (Sugar-Free) Lozenge 1 Lozenge Oral every 4 hours PRN  enoxaparin Injectable 40 milliGRAM(s) SubCutaneous daily  guaiFENesin   Syrup  (Sugar-Free) 100 milliGRAM(s) Oral every 6 hours PRN      vent settings      Vital Signs Last 24 Hrs  T(C): 36.5 (20 Apr 2020 14:31), Max: 36.6 (19 Apr 2020 18:00)  T(F): 97.7 (20 Apr 2020 14:31), Max: 97.9 (19 Apr 2020 18:00)  HR: 86 (20 Apr 2020 14:31) (82 - 86)  BP: 112/72 (20 Apr 2020 14:31) (112/72 - 126/74)  BP(mean): --  RR: 20 (20 Apr 2020 14:31) (17 - 20)  SpO2: 93% (20 Apr 2020 16:22) (89% - 96%)    ABG - ( 20 Apr 2020 16:38 )  pH, Arterial: 7.48  pH, Blood: x     /  pCO2: 31    /  pO2: 49    / HCO3: 23    / Base Excess: 0.5   /  SaO2: 84                    04-19 @ 07:01  -  04-20 @ 07:00  --------------------------------------------------------  IN: 0 mL / OUT: 250 mL / NET: -250 mL          LABS:                        12.6   9.89  )-----------( 622      ( 19 Apr 2020 13:08 )             38.5     04-19    136  |  101  |  21<H>  ----------------------------<  199<H>  4.5   |  27  |  0.68    Ca    8.2<L>      19 Apr 2020 13:08  Phos  3.2     04-19  Mg     2.5     04-19    TPro  6.6  /  Alb  2.2<L>  /  TBili  0.6  /  DBili  x   /  AST  67<H>  /  ALT  145<H>  /  AlkPhos  272<H>  04-19          CAPILLARY BLOOD GLUCOSE            CULTURES:        Physical Examination:    >>>      RADIOLOGY REVIEWED ***            ASSESSMENT AND PLAN:      Neuro  -    Cardiovascular  -    Pulmonary  -    Infections  -    Nephro  -    Gastrointestinal  -    Heme  -    Endocrine  -    Skin/Catheters  -    FEN  -    Prophylaxis   - Patient is a 49y old  Male who presents with a chief complaint of fever cough (20 Apr 2020 09:52)      Initial HPI on admission: 49 years old Male patient from home, who is known COVID positive from 04/10/2019 w/ no significant PMHx or PSHx presents to the ED with fever and headache. Patient reports he has been sick for the past x2 weeks with SOB. Patient adds he is COVID-19 positive and endorses generalized weakness. Patient came to ED 5 days ago with chest pain and shortness of breath and was tested for COVID 19. Patient was saturating fine and was discharged from ED. Patient denies any other complaints.  Patient denies any chest pain, palpitations, abdominal pain, nausea, vomiting abdominal pain , change in urinary or bowel habits.  Patient is admitted with acute respiratory failure secondary to COVID-19 infection requiring 3L NC saturating 96 percent. Will monitor respiratory status. COVID 19 here today is negative. (15 Apr 2020 18:47)      BRIEF HOSPITAL COURSE: 49 Male without medical problems was admitted for COVID-19 pneumonia. During the five days of hospital stay, he finished his steroids, Plaquenil and also got one dose of tocilizumab. ICU was consulted for hypoxia of low 80s' on 100% non-rebreather.        PAST MEDICAL & SURGICAL HISTORY:  No pertinent past medical history  No significant past surgical history    Allergies  No Known Allergies        FAMILY HISTORY:  Non-significant         Medications:  acetaminophen   Tablet .. 650 milliGRAM(s) Oral every 6 hours PRN  benzocaine 15 mG/menthol 3.6 mG (Sugar-Free) Lozenge 1 Lozenge Oral every 4 hours PRN  enoxaparin Injectable 40 milliGRAM(s) SubCutaneous daily  guaiFENesin   Syrup  (Sugar-Free) 100 milliGRAM(s) Oral every 6 hours PRN        Vital Signs Last 24 Hrs  T(C): 36.5 (20 Apr 2020 14:31), Max: 36.6 (19 Apr 2020 18:00)  T(F): 97.7 (20 Apr 2020 14:31), Max: 97.9 (19 Apr 2020 18:00)  HR: 86 (20 Apr 2020 14:31) (82 - 86)  BP: 112/72 (20 Apr 2020 14:31) (112/72 - 126/74)  BP(mean): --  RR: 20 (20 Apr 2020 14:31) (17 - 20)  SpO2: 93% (20 Apr 2020 16:22) (89% - 96%)    ABG - ( 20 Apr 2020 16:38 )  pH, Arterial: 7.48  pH, Blood: x     /  pCO2: 31    /  pO2: 49    / HCO3: 23    / Base Excess: 0.5   /  SaO2: 84                    04-19 @ 07:01  -  04-20 @ 07:00  --------------------------------------------------------  IN: 0 mL / OUT: 250 mL / NET: -250 mL          LABS:                        12.6   9.89  )-----------( 622      ( 19 Apr 2020 13:08 )             38.5     04-19    136  |  101  |  21<H>  ----------------------------<  199<H>  4.5   |  27  |  0.68    Ca    8.2<L>      19 Apr 2020 13:08  Phos  3.2     04-19  Mg     2.5     04-19    TPro  6.6  /  Alb  2.2<L>  /  TBili  0.6  /  DBili  x   /  AST  67<H>  /  ALT  145<H>  /  AlkPhos  272<H>  04-19            Physical Examination:  GENERAL: Well developed, Middle aged [place] male, tachypnea   HEENT:  Normocephalic/Atraumatic, reactive light reflex, moist mucous membranes  NECK: Supple, no JVD  RESP: Symmetric movement of the chest, clear to auscultation bilaterally, + rhonchi  CVS:  S1 and S2 audible, no murmur, rubs or gallops noted  GI: Normal active bowel sounds present, abdomen soft, non tender, non distended  EXTREMITIES:  No edema, no clubbing, cyanosis   MSK: 5/5 strength bilateral upper and lower extremities, intact sensations to light & crude touch  PSYCH: Normal mood, normal affect observed  NEURO: Alert and oriented x 3        ASSESSMENT AND PLAN:  49 Male without medical problems was admitted for COVID-19 pneumonia. Admitted to ICU for acute hypoxic respiratory failure.     Neuro  -    Cardiovascular  -    Pulmonary  -    Infections  -    Nephro  -    Gastrointestinal  -    Heme  -    Endocrine  -    Skin/Catheters  -    FEN  -    Prophylaxis   - Patient is a 49y old  Male who presents with a chief complaint of fever cough (20 Apr 2020 09:52)      Initial HPI on admission: 49 years old Male patient from home, who is known COVID positive from 04/10/2019 w/ no significant PMHx or PSHx presents to the ED with fever and headache. Patient reports he has been sick for the past x2 weeks with SOB. Patient adds he is COVID-19 positive and endorses generalized weakness. Patient came to ED 5 days ago with chest pain and shortness of breath and was tested for COVID 19. Patient was saturating fine and was discharged from ED. Patient denies any other complaints.  Patient denies any chest pain, palpitations, abdominal pain, nausea, vomiting abdominal pain , change in urinary or bowel habits.  Patient is admitted with acute respiratory failure secondary to COVID-19 infection requiring 3L NC saturating 96 percent. Will monitor respiratory status. COVID 19 here today is negative. (15 Apr 2020 18:47)      BRIEF HOSPITAL COURSE: 49 Male without medical problems was admitted for COVID-19 pneumonia. During the five days of hospital stay, he finished his steroids, Plaquenil and also got one dose of tocilizumab. ICU was consulted for hypoxia of low 80s' on 100% non-rebreather.        PAST MEDICAL & SURGICAL HISTORY:  No pertinent past medical history  No significant past surgical history    Allergies  No Known Allergies        FAMILY HISTORY:  Non-significant         Medications:  acetaminophen   Tablet .. 650 milliGRAM(s) Oral every 6 hours PRN  benzocaine 15 mG/menthol 3.6 mG (Sugar-Free) Lozenge 1 Lozenge Oral every 4 hours PRN  enoxaparin Injectable 40 milliGRAM(s) SubCutaneous daily  guaiFENesin   Syrup  (Sugar-Free) 100 milliGRAM(s) Oral every 6 hours PRN        Vital Signs Last 24 Hrs  T(C): 36.5 (20 Apr 2020 14:31), Max: 36.6 (19 Apr 2020 18:00)  T(F): 97.7 (20 Apr 2020 14:31), Max: 97.9 (19 Apr 2020 18:00)  HR: 86 (20 Apr 2020 14:31) (82 - 86)  BP: 112/72 (20 Apr 2020 14:31) (112/72 - 126/74)  BP(mean): --  RR: 20 (20 Apr 2020 14:31) (17 - 20)  SpO2: 93% (20 Apr 2020 16:22) (89% - 96%)    ABG - ( 20 Apr 2020 16:38 )  pH, Arterial: 7.48  pH, Blood: x     /  pCO2: 31    /  pO2: 49    / HCO3: 23    / Base Excess: 0.5   /  SaO2: 84                    04-19 @ 07:01  -  04-20 @ 07:00  --------------------------------------------------------  IN: 0 mL / OUT: 250 mL / NET: -250 mL          LABS:                        12.6   9.89  )-----------( 622      ( 19 Apr 2020 13:08 )             38.5     04-19    136  |  101  |  21<H>  ----------------------------<  199<H>  4.5   |  27  |  0.68    Ca    8.2<L>      19 Apr 2020 13:08  Phos  3.2     04-19  Mg     2.5     04-19    TPro  6.6  /  Alb  2.2<L>  /  TBili  0.6  /  DBili  x   /  AST  67<H>  /  ALT  145<H>  /  AlkPhos  272<H>  04-19            Physical Examination:  GENERAL: Well developed, Middle aged [place] male, tachypnea   HEENT:  Normocephalic/Atraumatic, reactive light reflex, moist mucous membranes  NECK: Supple, no JVD  RESP: Symmetric movement of the chest, clear to auscultation bilaterally, + rhonchi  CVS:  S1 and S2 audible, no murmur, rubs or gallops noted  GI: Normal active bowel sounds present, abdomen soft, non tender, non distended  EXTREMITIES:  No edema, no clubbing, cyanosis   MSK: 5/5 strength bilateral upper and lower extremities, intact sensations to light & crude touch  PSYCH: Normal mood, normal affect observed  NEURO: Alert and oriented x 3        ASSESSMENT AND PLAN:  49 Male without medical problems was admitted for COVID-19 pneumonia. Admitted to ICU for acute hypoxic respiratory failure.       Neuro  - Alert and oriented x 3.     Cardiovascular  - Sinus tachycardia from hypoxic failure.     Pulmonary  - Acute hypoxic respiratory failure. Saturating 78-82 % on 100% NRB. ABG performed showed ABG - ( 20 Apr 2020 16:38 ) pH, Arterial: 7.48  pH, Blood: x     /  pCO2: 31    /  pO2: 49    / HCO3: 23    / Base Excess: 0.5   /  SaO2: 84. Transfer to ICU for mechanical ventilation with low TV and high PEEP.     Infections  - COVID-19 viral pneumonia. Completed Plaquenil, Solumedrol and Tocilizumab.      Nephro  - No issues. Monitor urine output.     Gastrointestinal  - NPO for the anticipation for intubation     Heme  - Elevated D-dimer. Check. If more than 1000, start full dose anticoagulation.     Endocrine  - Monitor finger sticks between 120-180.     Prophylaxis   - Full dose anticoagulation for elevated D-dimer.

## 2020-04-20 NOTE — DIETITIAN INITIAL EVALUATION ADULT. - PROBLEM SELECTOR PLAN 1
Patient is admitted with acute respiratory failure secondary to COVID-19 infection requiring 3L NC saturating 96 percent.   Will monitor respiratory status.   Will start on hydroxychlorquine protocol.  COVID 19 here today is negative.  inflamatory markers are elevated.  will f/u crp, d-dimer, ferritin ,esr , ldh in case patient needs anakinra.  -For cough will start robutussin PRN

## 2020-04-20 NOTE — PROGRESS NOTE ADULT - ASSESSMENT
Assessment and Plan:   · Assessment		  Impression and Plan:  1. Hypoxemic respiratory failure likely due to viral pneumonia COVID-19   2.  Hypoalbuminemia  3.  Abnormal Liver function tests due to acute viral illness    - still with high O2 demand  - will dose with TOCI for COVID related illness (Ferritin, Serum: 1999 ng/mL 04.15.20,  Ferritin, Serum: 2213:  (04.17.20 @ 09:47)  - solumedrol 40mg Q12 hrs  - finished course hydroxychloroquine for COVID related illness  - continue to monitor closely  - continue supplemental oxygen as need to keep SaO2 >95% (titrate down as tolerated)  - Supportive measures  - Tylenol PRN for fever  -  Robitussin as needed for cough  - Continue Isolation precautions based on COVID-19 infection control protocol  - monitor immunology labs for at least 3 more days given dosing of TOCI

## 2020-04-20 NOTE — PROGRESS NOTE ADULT - SUBJECTIVE AND OBJECTIVE BOX
49 years old Male patient from home, who is known COVID positive from 04/10/2019 w/ no significant PMHx or PSHx presented to the ED with fever and headache. Patient reported he has been sick x2 weeks with SOB. Patient adds he is COVID-19 positive and endorses generalized weakness. Patient came to ED 5 days ago with chest pain and shortness of breath and was tested for COVID 19. Patient was saturating fine and was discharged from ED. Patient denies any other complaints.   Patient denies any chest pain, palpitations, abdominal pain, nausea, vomiting abdominal pain , change in urinary or bowel habits.  Patient admitted 4-15 with acute respiratory failure secondary to COVID-19 infection requiring 3L NC saturating 96 percent. Will monitor respiratory status. COVID 19 here today is negative. (15 Apr 2020 18:47)    MEDICATIONS  (STANDING):  enoxaparin Injectable 40 milliGRAM(s) SubCutaneous daily  hydroxychloroquine   Oral   hydroxychloroquine 400 milliGRAM(s) Oral every 24 hours    MEDICATIONS  (PRN):  acetaminophen   Tablet .. 650 milliGRAM(s) Oral every 6 hours PRN Temp greater or equal to 38C (100.4F), Mild Pain (1 - 3)  benzocaine 15 mG/menthol 3.6 mG (Sugar-Free) Lozenge 1 Lozenge Oral every 4 hours PRN Sore Throat  guaiFENesin   Syrup  (Sugar-Free) 100 milliGRAM(s) Oral every 6 hours PRN Cough      ICU Vital Signs Last 24 Hrs  T(C): 36.6 (20 Apr 2020 05:50), Max: 36.7 (19 Apr 2020 11:28)  T(F): 97.9 (20 Apr 2020 05:50), Max: 98 (19 Apr 2020 11:28)  HR: 83 (20 Apr 2020 05:50) (81 - 86)  BP: 126/74 (20 Apr 2020 05:50) (103/61 - 126/74)  BP(mean): --  ABP: --  ABP(mean): --  RR: 17 (20 Apr 2020 05:50) (17 - 20)  SpO2: 95% nrb 15l      PHYSICAL EXAM:  GENERAL: NAD  Head: AT/NC  eyes: nonicteric  Mouth: dry membranes  RESP: Good air entry bilat.                            12.6   9.89  )-----------( 622      ( 19 Apr 2020 13:08 )             38.5     04-19    136  |  101  |  21<H>  ----------------------------<  199<H>  4.5   |  27  |  0.68    Ca    8.2<L>      19 Apr 2020 13:08  Phos  3.2     04-19  Mg     2.5     04-19    TPro  6.6  /  Alb  2.2<L>  /  TBili  0.6  /  DBili  x   /  AST  67<H>  /  ALT  145<H>  /  AlkPhos  272<H>  04-19

## 2020-04-20 NOTE — AIRWAY PLACEMENT NOTE ADULT - POST AIRWAY PLACEMENT ASSESSMENT:
chest excursion noted/ett through cords visualized by team with glidescope/breath sounds equal/breath sounds bilateral

## 2020-04-20 NOTE — DIETITIAN INITIAL EVALUATION ADULT. - PERTINENT MEDS FT
MEDICATIONS  (STANDING):  enoxaparin Injectable 40 milliGRAM(s) SubCutaneous daily    MEDICATIONS  (PRN):  acetaminophen   Tablet .. 650 milliGRAM(s) Oral every 6 hours PRN Temp greater or equal to 38C (100.4F), Mild Pain (1 - 3)  benzocaine 15 mG/menthol 3.6 mG (Sugar-Free) Lozenge 1 Lozenge Oral every 4 hours PRN Sore Throat  guaiFENesin   Syrup  (Sugar-Free) 100 milliGRAM(s) Oral every 6 hours PRN Cough

## 2020-04-20 NOTE — CONSULT NOTE ADULT - ATTENDING COMMENTS
Patient is a 50 y/o male with flu like sx admitted a few days ago after having tested + for COVID 19 on september 10. No PMH. CXR from 4- 15 -20 shows bilateral infiltrates and there was arterial desaturation on the 3rd floor where he was admitted. ABG- 7.48/31/49 while on a non rebreather mask. Will treat with hydroxychloroquine/Thiamine/VIT C. Dx- severe pneumonia due to corona virus, acute respiratory failure with hypoxia. I reviewed all roentgenographic and laboratory data, nurses notes and discussed the case with the referring/ED physician. Critical care time Patient is a 48 y/o male with flu like sx admitted a few days ago after having tested + for COVID 19 on september 10. No PMH. CXR from 4- 15 -20 shows bilateral infiltrates and there was arterial desaturation on the 3rd floor where he was admitted. ABG- 7.48/31/49 while on a non rebreather mask. Will continue with tocilizumab, hydroxychloroquine/Thiamine/VIT C, solumedrol. Dx- severe pneumonia due to corona virus, acute respiratory failure with hypoxia. Sedate as needed, will review ABG post intubation and adjust ventilator as appropriate ( based on a protective ventilatory strategy ). I reviewed all roentgenographic and laboratory data, nurses notes and discussed the case with the referring/ED physician. Critical care time 40 minutes.

## 2020-04-21 NOTE — PROCEDURE NOTE - NSPROCDETAILS_GEN_ALL_CORE
connected to a pressurized flush line/all materials/supplies accounted for at end of procedure/positive blood return obtained via catheter/location identified, draped/prepped, sterile technique used, needle inserted/introduced/sutured in place/hemostasis with direct pressure, dressing applied

## 2020-04-21 NOTE — CHART NOTE - NSCHARTNOTEFT_GEN_A_CORE
Assessment:   Patient is a 49y old  Male who presents with a chief complaint of fever cough (2020 12:39). pt transferred to ICU. Intubated. TF order (not on flow). Covid+. ?Propofol D/C. Paralytic.      Factors impacting intake: [ ] none [ ] nausea  [ ] vomiting [ ] diarrhea [ ] constipation  [ ]chewing problems [ ] swallowing issues  [x ] other: critically ill, intubated.    Diet Prescription: Diet, NPO with Tube Feed:   Tube Feeding Modality: Nasogastric  Jevity 1.5 William  Total Volume for 24 Hours (mL): 240  Continuous  Starting Tube Feed Rate {mL per Hour}: 10  Until Goal Tube Feed Rate (mL per Hour): 10  Tube Feed Duration (in Hours): 24  Tube Feed Start Time: 09:00 (20 @ 08:54)        Daily Height in cm: 157.48 (15 Apr 2020 13:15)      Daily Weight in k.8 (2020 11:55)      Pertinent Medications: MEDICATIONS  (STANDING):  chlorhexidine 0.12% Liquid 15 milliLiter(s) Oral Mucosa every 12 hours  chlorhexidine 2% Cloths 1 Application(s) Topical <User Schedule>  enoxaparin Injectable 70 milliGRAM(s) SubCutaneous every 12 hours  HYDROmorphone Infusion 1 mG/Hr (1 mL/Hr) IV Continuous <Continuous>  midazolam Infusion 0.02 mG/kG/Hr (1.42 mL/Hr) IV Continuous <Continuous>  pantoprazole  Injectable 40 milliGRAM(s) IV Push daily  rocuronium Infusion 8 MICROgram(s)/kG/Min (6.8 mL/Hr) IV Continuous <Continuous>    MEDICATIONS  (PRN):  acetaminophen   Tablet .. 650 milliGRAM(s) Oral every 6 hours PRN Temp greater or equal to 38C (100.4F), Mild Pain (1 - 3)  sodium chloride 0.9% lock flush 10 milliLiter(s) IV Push every 1 hour PRN Pre/post blood products, medications, blood draw, and to maintain line patency    Pertinent Labs:  Na138 mmol/L Glu 104 mg/dL<H> K+ 5.1 mmol/L Cr  0.64 mg/dL BUN 16 mg/dL  Phos 5.3 mg/dL<H>  Alb 2.5 g/dL<L> 04-16 Chol 128 mg/dL LDL 78 mg/dL HDL 24 mg/dL<L> Trig 130 mg/dL     CAPILLARY BLOOD GLUCOSE      POCT Blood Glucose.: 114 mg/dL (2020 11:22)        Estimated Needs:   [ ] no change since previous assessment  [x ] recalculated: at 70.8 kg (15-20 kcals/ kg): 7529-2324 kcals/kg      Interventions:   Recommend  [ ] Change Diet To:  [ ] Nutrition Supplement  [x ] Nutrition Support: trickle feeds with Jevity 1.5. Add 1 Pkt Prosource BID.  [x ] Other: MD to monitor. RD available.     Monitoring and Evaluation:      [ x ] Tolerance to diet prescription [ x ] weights [ x ] labs[ x ] follow up per protocol  [ ] other: Assessment:   Patient is a 49y old  Male who presents with a chief complaint of fever cough (2020 12:39). pt transferred to ICU. Intubated. TF order (not on flow). Covid+. ?Propofol D/C. Paralytic.      Factors impacting intake: [ ] none [ ] nausea  [ ] vomiting [ ] diarrhea [ ] constipation  [ ]chewing problems [ ] swallowing issues  [x ] other: critically ill, intubated.    Diet Prescription: Diet, NPO with Tube Feed:   Tube Feeding Modality: Nasogastric  Jevity 1.5 William  Total Volume for 24 Hours (mL): 240  Continuous  Starting Tube Feed Rate {mL per Hour}: 10  Until Goal Tube Feed Rate (mL per Hour): 10  Tube Feed Duration (in Hours): 24  Tube Feed Start Time: 09:00 (20 @ 08:54)        Daily Height in cm: 157.48 (15 Apr 2020 13:15)      Daily Weight in k.8 (2020 11:55)      Pertinent Medications: MEDICATIONS  (STANDING):  chlorhexidine 0.12% Liquid 15 milliLiter(s) Oral Mucosa every 12 hours  chlorhexidine 2% Cloths 1 Application(s) Topical <User Schedule>  enoxaparin Injectable 70 milliGRAM(s) SubCutaneous every 12 hours  HYDROmorphone Infusion 1 mG/Hr (1 mL/Hr) IV Continuous <Continuous>  midazolam Infusion 0.02 mG/kG/Hr (1.42 mL/Hr) IV Continuous <Continuous>  pantoprazole  Injectable 40 milliGRAM(s) IV Push daily  rocuronium Infusion 8 MICROgram(s)/kG/Min (6.8 mL/Hr) IV Continuous <Continuous>    MEDICATIONS  (PRN):  acetaminophen   Tablet .. 650 milliGRAM(s) Oral every 6 hours PRN Temp greater or equal to 38C (100.4F), Mild Pain (1 - 3)  sodium chloride 0.9% lock flush 10 milliLiter(s) IV Push every 1 hour PRN Pre/post blood products, medications, blood draw, and to maintain line patency    Pertinent Labs:  Na138 mmol/L Glu 104 mg/dL<H> K+ 5.1 mmol/L Cr  0.64 mg/dL BUN 16 mg/dL  Phos 5.3 mg/dL<H>  Alb 2.5 g/dL<L> 04-16 Chol 128 mg/dL LDL 78 mg/dL HDL 24 mg/dL<L> Trig 130 mg/dL     CAPILLARY BLOOD GLUCOSE      POCT Blood Glucose.: 114 mg/dL (2020 11:22)        Estimated Needs:   [ ] no change since previous assessment  [x ] recalculated: at 70.8 kg (15-20 kcals/ kg)=1746-3187 kcals/ day      Interventions:   Recommend  [ ] Change Diet To:  [ ] Nutrition Supplement  [x ] Nutrition Support: trickle feeds with Jevity 1.5. Add 1 Pkt Prosource BID.  [x ] Other: MD to monitor. RD available.     Monitoring and Evaluation:      [ x ] Tolerance to diet prescription [ x ] weights [ x ] labs[ x ] follow up per protocol  [ ] other:

## 2020-04-21 NOTE — PROGRESS NOTE ADULT - ASSESSMENT
Neuro  - sedated  on propofol, versed, hydro,     Cardiovascular  - on pheny  - keep map >65    Pulmonary  - Acute hypoxic respiratory failure. Saturating 78-82 % on 100% NRB. ABG performed showed ABG - ( 20 Apr 2020 16:38 ) pH, Arterial: 7.48  pH, Blood: x     /  pCO2: 31    /  pO2: 49    / HCO3: 23    / Base Excess: 0.5   /  SaO2: 84. Transfer to ICU for mechanical ventilation with low TV and high PEEP.     Infections  - COVID-19 viral pneumonia. Completed Plaquenil, Solumedrol and Tocilizumab.      Nephro  - No issues. Monitor urine output.     Gastrointestinal  - NPO for the anticipation for intubation     Heme  - Elevated D-dimer. Check. If more than 1000, start full dose anticoagulation.     Endocrine  - Monitor finger sticks between 120-180.     Prophylaxis   - Full dose anticoagulation for elevated D-dimer. Neuro  - sedated  on propofol, versed, and Dilaudid      Cardiovascular  - on pheny  - keep map >65    Pulmonary  - Acute hypoxic respiratory failure secondary to covid  - ABG - 7.27/65/94/29; vent 35/400/90/15- pt desaturated to 88%- increased fio2 to 90. Will prone pt today    Infections  - COVID-19 viral pneumonia. Completed Plaquenil, Solumedrol and Tocilizumab.    - monitor crp, ferritin, procalcitonin.  CRP trending down    Nephro  - No issues. Monitor urine output.     Gastrointestinal  - NPO   - prone today    Heme  - Elevated D-dimer > 1000, started full dose anticoagulation.     Endocrine  - Monitor finger sticks between 120-180.     Prophylaxis   - Full dose anticoagulation for elevated D-dimer.

## 2020-04-21 NOTE — PROGRESS NOTE ADULT - ATTENDING COMMENTS
49M a/w hypoxic resp failure 2/2 Covid PNA s/p intubation 4/20 after decompensation on the floor  - sedated with propofol, versed, dilaudid  - ABG reviewed, vent adjusted utilizing lung protective strategy to current settings of 35/400/80/12 with plans to prone this PM  - s/p course plaquinil, solu-medrol and toci; inflammatory markers downtrending  - NPO for proning; protonix gi ppx  - renal function stable, K 5.1 with adequate UOP - continue to monitor  - full dose lovenox for elevated ddimer/hypercoag state of Covid infection    pt critically ill, prognosis guarded; full code

## 2020-04-21 NOTE — PROGRESS NOTE ADULT - SUBJECTIVE AND OBJECTIVE BOX
INTERVAL /OVERNIGHT EVENTS: Pt sedated and on pressors.     PRESSORS: [ x] YES [ ] NO  WHICH: Pheny    ANTIBIOTICS:                  DATE STARTED:  ANTIBIOTICS:                  DATE STARTED:  ANTIBIOTICS:                  DATE STARTED:    Antimicrobial:    Cardiovascular:    Pulmonary:    Hematalogic:  enoxaparin Injectable 70 milliGRAM(s) SubCutaneous every 12 hours    Other:  acetaminophen   Tablet .. 650 milliGRAM(s) Oral every 6 hours PRN  chlorhexidine 0.12% Liquid 15 milliLiter(s) Oral Mucosa every 12 hours  chlorhexidine 2% Cloths 1 Application(s) Topical <User Schedule>  HYDROmorphone Infusion 1 mG/Hr IV Continuous <Continuous>  midazolam Infusion 0.02 mG/kG/Hr IV Continuous <Continuous>  pantoprazole  Injectable 40 milliGRAM(s) IV Push daily  rocuronium Infusion 8 MICROgram(s)/kG/Min IV Continuous <Continuous>  sodium chloride 0.9% lock flush 10 milliLiter(s) IV Push every 1 hour PRN    acetaminophen   Tablet .. 650 milliGRAM(s) Oral every 6 hours PRN  chlorhexidine 0.12% Liquid 15 milliLiter(s) Oral Mucosa every 12 hours  chlorhexidine 2% Cloths 1 Application(s) Topical <User Schedule>  enoxaparin Injectable 70 milliGRAM(s) SubCutaneous every 12 hours  HYDROmorphone Infusion 1 mG/Hr IV Continuous <Continuous>  midazolam Infusion 0.02 mG/kG/Hr IV Continuous <Continuous>  pantoprazole  Injectable 40 milliGRAM(s) IV Push daily  rocuronium Infusion 8 MICROgram(s)/kG/Min IV Continuous <Continuous>  sodium chloride 0.9% lock flush 10 milliLiter(s) IV Push every 1 hour PRN    Drug Dosing Weight  Height (cm): 157.48 (15 Apr 2020 13:15)  Weight (kg): 70.8 (15 Apr 2020 13:15)  BMI (kg/m2): 28.5 (15 Apr 2020 13:15)  BSA (m2): 1.72 (15 Apr 2020 13:15)    CENTRAL LINE: [x YES [ ] NO  LOCATION:   DATE INSERTED:  REMOVE: [ ] YES [ ] NO  EXPLAIN:    DIGGS: [x ] YES [ ] NO    DATE INSERTED:  REMOVE:  [ ] YES [ ] NO  EXPLAIN:    A-LINE:  [x ] YES [ ] NO  LOCATION:   DATE INSERTED:  REMOVE:  [ ] YES [ ] NO  EXPLAIN:      ICU Vital Signs Last 24 Hrs  T(C): 35.6 (21 Apr 2020 08:00), Max: 36.9 (21 Apr 2020 04:00)  T(F): 96 (21 Apr 2020 08:00), Max: 98.4 (21 Apr 2020 04:00)  HR: 100 (21 Apr 2020 12:35) (64 - 118)  BP: 86/49 (20 Apr 2020 20:00) (86/49 - 123/67)  BP(mean): 67 (20 Apr 2020 19:00) (67 - 88)  ABP: 99/61 (21 Apr 2020 12:35) (99/61 - 147/72)  ABP(mean): 72 (21 Apr 2020 12:35) (72 - 97)  RR: 30 (21 Apr 2020 12:35) (20 - 40)  SpO2: 91% (21 Apr 2020 12:35) (86% - 99%)      ABG - ( 21 Apr 2020 04:06 )  pH, Arterial: 7.27  pH, Blood: x     /  pCO2: 65    /  pO2: 94    / HCO3: 29    / Base Excess: 0.3   /  SaO2: 95            04-20 @ 07:01  -  04-21 @ 07:00  --------------------------------------------------------  IN: 73.6 mL / OUT: 1700 mL / NET: -1626.4 mL        Mode: AC/ CMV (Assist Control/ Continuous Mandatory Ventilation)  RR (machine): 35  TV (machine): 420  FiO2: 80  PEEP: 15  MAP: 24  PIP: 40      PHYSICAL EXAM:    GENERAL:   HEAD: atraumatic, normocephalic   ENMT: nasal mucosa- Oral cavity- dry  NECK: supple, JVD/ no JVD, thyroid-   CHEST/LUNG: ET tube in place, no wheezing  HEART: RRR, no m/r/g   ABDOMEN: soft, nontender, nondistended; bowel sounds.  : diggs catheter.  EXTREMITIES: +1 non-pitting edema, no cyanosis, no clubbing.  NEURO: sedated        LABS:  CBC Full  -  ( 21 Apr 2020 06:43 )  WBC Count : 24.63 K/uL  RBC Count : 4.98 M/uL  Hemoglobin : 14.4 g/dL  Hematocrit : 44.9 %  Platelet Count - Automated : 991 K/uL  Mean Cell Volume : 90.2 fl  Mean Cell Hemoglobin : 28.9 pg  Mean Cell Hemoglobin Concentration : 32.1 gm/dL  Auto Neutrophil # : 21.17 K/uL  Auto Lymphocyte # : 1.72 K/uL  Auto Monocyte # : 1.10 K/uL  Auto Eosinophil # : 0.15 K/uL  Auto Basophil # : 0.05 K/uL  Auto Neutrophil % : 85.9 %  Auto Lymphocyte % : 7.0 %  Auto Monocyte % : 4.5 %  Auto Eosinophil % : 0.6 %  Auto Basophil % : 0.2 %    04-21    138  |  104  |  16  ----------------------------<  104<H>  5.1   |  27  |  0.64    Ca    8.5      21 Apr 2020 06:43  Phos  5.3     04-21  Mg     2.9     04-21    TPro  6.7  /  Alb  2.5<L>  /  TBili  0.6  /  DBili  x   /  AST  57<H>  /  ALT  194<H>  /  AlkPhos  270<H>  04-21            RADIOLOGY & ADDITIONAL STUDIES REVIEWED:   < from: Xray Chest 1 View- PORTABLE-Urgent (04.20.20 @ 21:32) >    EXAM:  XR CHEST PORTABLE URGENT 1V                            PROCEDURE DATE:  04/20/2020          INTERPRETATION:  Chest one view    HISTORY: Intubation    COMPARISON STUDY: 4/15/2020    Frontal expiratory view of the chest shows the heart to be similar in size. Endotracheal tube, feeding tube and right jugular line are present.    The lungs show progression of bilateral pulmonary infiltrates, predominantly in the right upper lobe and left lower lobe. There is no evidence of pneumothorax nor pleural effusion.    IMPRESSION:  Postintubation. Progression of infiltrates.    Thank you for the courtesy of this referral.    < end of copied text >      [ ]GOALS OF CARE DISCUSSION WITH PATIENT/FAMILY/PROXY:    CRITICAL CARE TIME SPENT: 35 minutes

## 2020-04-21 NOTE — CONSULT NOTE ADULT - SUBJECTIVE AND OBJECTIVE BOX
Time of visit:    CHIEF COMPLAINT: Patient is a 49y old  Male who presents with a chief complaint of fever cough (21 Apr 2020 12:39)      HPI:  49 years old Male patient from home, who is known COVID positive from 04/10/2019 w/ no significant PMHx or PSHx presents to the ED with fever and headache. Patient reports he has been sick for the past x2 weeks with SOB. Patient adds he is COVID-19 positive and endorses generalized weakness. Patient came to ED 5 days ago with chest pain and shortness of breath and was tested for COVID 19. Patient was saturating fine and was discharged from ED. Patient denies any other complaints.  Patient denies any chest pain, palpitations, abdominal pain, nausea, vomiting abdominal pain , change in urinary or bowel habits.  Patient is admitted with acute respiratory failure secondary to COVID-19 infection requiring 3L NC saturating 96 percent. Will monitor respiratory status. COVID 19 here today is negative. (15 Apr 2020 18:47)   Patient seen and examined.     PAST MEDICAL & SURGICAL HISTORY:  No pertinent past medical history  No significant past surgical history      Allergies    No Known Allergies    Intolerances        MEDICATIONS  (STANDING):  chlorhexidine 0.12% Liquid 15 milliLiter(s) Oral Mucosa every 12 hours  chlorhexidine 2% Cloths 1 Application(s) Topical <User Schedule>  enoxaparin Injectable 70 milliGRAM(s) SubCutaneous every 12 hours  HYDROmorphone Infusion 1 mG/Hr (1 mL/Hr) IV Continuous <Continuous>  lactulose Syrup 10 Gram(s) Oral every 12 hours  midazolam Infusion 0.02 mG/kG/Hr (1.42 mL/Hr) IV Continuous <Continuous>  pantoprazole  Injectable 40 milliGRAM(s) IV Push daily  phenylephrine    Infusion 0.2 MICROgram(s)/kG/Min (2.66 mL/Hr) IV Continuous <Continuous>  rocuronium Infusion 8 MICROgram(s)/kG/Min (6.8 mL/Hr) IV Continuous <Continuous>      MEDICATIONS  (PRN):  acetaminophen   Tablet .. 650 milliGRAM(s) Oral every 6 hours PRN Temp greater or equal to 38C (100.4F), Mild Pain (1 - 3)  sodium chloride 0.9% lock flush 10 milliLiter(s) IV Push every 1 hour PRN Pre/post blood products, medications, blood draw, and to maintain line patency   Medications up to date at time of exam.    Medications up to date at time of exam.    FAMILY HISTORY:      SOCIAL HISTORY  Smoking History: [   ] smoking/smoke exposure, [   ] former smoker  Living Condition: [   ] apartment, [   ] private house  Work History:   Travel History: denies recent travel  Illicit Substance Use: denies  Alcohol Use: denies    REVIEW OF SYSTEMS: sedated     CONSTITUTIONAL:  denies fevers, chills, sweats, weight loss    HEENT:  denies diplopia or blurred vision, sore throat or runny nose.    CARDIOVASCULAR:  denies pressure, squeezing, tightness, or heaviness about the chest; no palpitations.    RESPIRATORY:  denies SOB, cough, MUNOZ, wheezing.    GASTROINTESTINAL:  denies abdominal pain, nausea, vomiting or diarrhea.    GENITOURINARY: denies dysuria, frequency or urgency.    NEUROLOGIC:  denies numbness, tingling, seizures or weakness.    PSYCHIATRIC:  denies disorder of thought or mood.    MSK: denies swelling, redness      PHYSICAL EXAMINATION:    GENERAL: The patient is a well-developed, well-nourished, in no apparent distress.     Vital Signs Last 24 Hrs  T(C): 38 (21 Apr 2020 16:18), Max: 38 (21 Apr 2020 16:18)  T(F): 100.4 (21 Apr 2020 16:18), Max: 100.4 (21 Apr 2020 16:18)  HR: 97 (21 Apr 2020 17:19) (64 - 100)  BP: --  BP(mean): --  RR: 35 (21 Apr 2020 16:18) (30 - 35)  SpO2: 96% (21 Apr 2020 17:19) (91% - 99%)  Mode: AC/ CMV (Assist Control/ Continuous Mandatory Ventilation)  RR (machine): 35  TV (machine): 420  FiO2: 80  PEEP: 15  PIP: 40   (if applicable)    Chest Tube (if applicable)    HEENT: Head is normocephalic and atraumatic. Extraocular muscles are intact. Mucous membranes are moist.  +ETT    NECK: Supple, no palpable adenopathy.    LUNGS: Clear to auscultation, no wheezing, rales, or rhonchi.    HEART: Regular rate and rhythm without murmur.    ABDOMEN: Soft, nontender, and nondistended.  No hepatosplenomegaly is noted.    RENAL: No difficulty voiding, no pelvic pain    EXTREMITIES: Without any cyanosis, clubbing, rash, lesions or edema.    NEUROLOGIC: sedated    SKIN: Warm, dry, good turgor.      LABS:                        14.4   24.63 )-----------( 991      ( 21 Apr 2020 06:43 )             44.9     04-21    138  |  104  |  16  ----------------------------<  104<H>  5.1   |  27  |  0.64    Ca    8.5      21 Apr 2020 06:43  Phos  5.3     04-21  Mg     2.9     04-21    TPro  6.7  /  Alb  2.5<L>  /  TBili  0.6  /  DBili  x   /  AST  57<H>  /  ALT  194<H>  /  AlkPhos  270<H>  04-21        ABG - ( 21 Apr 2020 18:21 )  pH, Arterial: 7.38  pH, Blood: x     /  pCO2: 49    /  pO2: 97    / HCO3: 28    / Base Excess: 2.6   /  SaO2: 97                    D-Dimer Assay, Quantitative: 1144 ng/mL DDU (04-21-20 @ 06:43)        Procalcitonin, Serum: 0.10 ng/mL (04-21-20 @ 14:47)      MICROBIOLOGY: (if applicable)    covid:COVID-19 PCR . (04.10.20 @ 21:35)    COVID-19 PCR: Detected:       RADIOLOGY & ADDITIONAL STUDIES:  EKG:   CXR:< from: Xray Chest 1 View- PORTABLE-Urgent (04.20.20 @ 21:32) >    EXAM:  XR CHEST PORTABLE URGENT 1V                            PROCEDURE DATE:  04/20/2020          INTERPRETATION:  Chest one view    HISTORY: Intubation    COMPARISON STUDY: 4/15/2020    Frontal expiratory view of the chest shows the heart to be similar in size. Endotracheal tube, feeding tube and right jugular line are present.    The lungs show progression of bilateral pulmonary infiltrates, predominantly in the right upper lobe and left lower lobe. There is no evidence of pneumothorax nor pleural effusion.    IMPRESSION:  Postintubation. Progression of infiltrates.    Thank you for the courtesy of this referral.                THOMAS MENARD M.D., ATTENDING RADIOLOGIST  This document has been electronically signed. Apr 21 2020  7:46AM                < end of copied text >    ECHO:    IMPRESSION: 49y Male PAST MEDICAL & SURGICAL HISTORY:  No pertinent past medical history  No significant past surgical history   p/w           IMP:  This is a 49 yr old man with prior mentioned medical condition admitted with acute hypoxic resp failure due to b/l pna secondary to covid-19 infection. He was intubated 4/20.    S/p IL-6, plaquenil and  steroids     -acute hypoxic resp failure  -b/l pna  -covid-19 infection  -ARDS  -septic shock    Plan:  -continue vent support with lung protective strategy  -sedated / pain control  -continue pressors to maintain MAP>65  -continue anticoag  -ngt feed  -trend biomarkers

## 2020-04-22 NOTE — PROGRESS NOTE ADULT - ASSESSMENT
Neuro  - sedated  on propofol, versed, and Dilaudid      Cardiovascular  - on pheny  - keep map >65    Pulmonary  - Acute hypoxic respiratory failure secondary to covid  - ABG - pH, Arterial: 7.33  pH, Blood: x     /  pCO2: 54    /  pO2: 107   / HCO3: 28    / Base Excess: 1.0   /  SaO2: 97   vent 35/420/60/10- (fio2 decreased)  Pt s/p proning yesterday.   - + new sc emphysema and pneumo - peep decreased    Infections  - COVID-19 viral pneumonia. Completed Plaquenil, Solumedrol and Tocilizumab.    - monitor crp, ferritin, procalcitonin.  CRP trending down    Nephro  - No issues. Monitor urine output.     Gastrointestinal  - NPO   - pt was proned yesterday    Heme  - Elevated D-dimer > 1000, started full dose anticoagulation.     Endocrine  - Monitor finger sticks between 120-180.     Prophylaxis   - Full dose anticoagulation for elevated D-dimer.

## 2020-04-22 NOTE — PROGRESS NOTE ADULT - SUBJECTIVE AND OBJECTIVE BOX
Time of Visit:  Patient seen and examined.     MEDICATIONS  (STANDING):  chlorhexidine 0.12% Liquid 15 milliLiter(s) Oral Mucosa every 12 hours  chlorhexidine 2% Cloths 1 Application(s) Topical <User Schedule>  enoxaparin Injectable 70 milliGRAM(s) SubCutaneous every 12 hours  HYDROmorphone Infusion 1 mG/Hr (1 mL/Hr) IV Continuous <Continuous>  lactulose Syrup 10 Gram(s) Oral every 12 hours  midazolam Infusion 0.02 mG/kG/Hr (1.42 mL/Hr) IV Continuous <Continuous>  pantoprazole  Injectable 40 milliGRAM(s) IV Push daily  phenylephrine    Infusion 0.2 MICROgram(s)/kG/Min (2.66 mL/Hr) IV Continuous <Continuous>  propofol Infusion 10 MICROgram(s)/kG/Min (4.25 mL/Hr) IV Continuous <Continuous>  rocuronium Infusion 8 MICROgram(s)/kG/Min (6.8 mL/Hr) IV Continuous <Continuous>      MEDICATIONS  (PRN):  acetaminophen   Tablet .. 650 milliGRAM(s) Oral every 6 hours PRN Temp greater or equal to 38C (100.4F), Mild Pain (1 - 3)  sodium chloride 0.9% lock flush 10 milliLiter(s) IV Push every 1 hour PRN Pre/post blood products, medications, blood draw, and to maintain line patency       Medications up to date at time of exam.      PHYSICAL EXAMINATION:  Patient has no new complaints.  GENERAL: The patient is a well-developed, well-nourished, in no apparent distress.     Vital Signs Last 24 Hrs  T(C): 37 (22 Apr 2020 12:00), Max: 38.9 (22 Apr 2020 00:00)  T(F): 98.6 (22 Apr 2020 12:00), Max: 102.1 (22 Apr 2020 00:00)  HR: 114 (22 Apr 2020 12:00) (75 - 114)  BP: --  BP(mean): --  RR: 35 (22 Apr 2020 12:00) (35 - 35)  SpO2: 97% (22 Apr 2020 12:00) (95% - 98%)  Mode: AC/ CMV (Assist Control/ Continuous Mandatory Ventilation)  RR (machine): 35  TV (machine): 420  FiO2: 80  PEEP: 12  MAP: 20  PIP: 35   (if applicable)    Chest Tube (if applicable)    HEENT: Head is normocephalic and atraumatic. Extraocular muscles are intact. Mucous membranes are moist.  +ETT    NECK: Supple, no palpable adenopathy.    LUNGS: Clear to auscultation, no wheezing, rales, or rhonchi.    HEART: Regular rate and rhythm without murmur.    ABDOMEN: Soft, nontender, and nondistended.  No hepatosplenomegaly is noted.    : No painful voiding, no pelvic pain    EXTREMITIES: Without any cyanosis, clubbing, rash, lesions or edema.    NEUROLOGIC: sedated    SKIN: Warm, dry, good turgor.      LABS:                        16.0   24.46 )-----------( 847      ( 22 Apr 2020 05:43 )             49.6     04-22    141  |  105  |  24<H>  ----------------------------<  87  4.2   |  26  |  0.89    Ca    8.1<L>      22 Apr 2020 05:43  Phos  5.0     04-22  Mg     2.6     04-22    TPro  6.6  /  Alb  2.6<L>  /  TBili  0.8  /  DBili  x   /  AST  45<H>  /  ALT  163<H>  /  AlkPhos  241<H>  04-22        ABG - ( 22 Apr 2020 04:12 )  pH, Arterial: 7.33  pH, Blood: x     /  pCO2: 54    /  pO2: 107   / HCO3: 28    / Base Excess: 1.0   /  SaO2: 97                          Procalcitonin, Serum: 0.25 ng/mL (04-22-20 @ 09:33)  Procalcitonin, Serum: 0.10 ng/mL (04-21-20 @ 14:47)      MICROBIOLOGY: (if applicable)    RADIOLOGY & ADDITIONAL STUDIES:  EKG:   CXR:< from: Xray Chest 1 View- PORTABLE-Routine (04.22.20 @ 07:35) >    EXAM:  XR CHEST PORTABLE ROUTINE 1V                            PROCEDURE DATE:  04/22/2020          INTERPRETATION:  INDICATION: Covid pneumonia; follow-up assessment.    PRIORS: 4/20/2020 9:01 PM    VIEWS: Portable AP radiography of the chest performed.    FINDINGS: No change in position of indwelling ETT and right IJ CVC. No change in position of indwelling NGT. Heart size appears within normal limits. No superior mediastinal widening identified.  New Pneumomediastinum as well as subcutaneousemphysema identified. Bilateral lung parenchymal airspace opacities are stable on the right and improved on the left. No pleural effusion identified. No evidence of pneumothorax. No mediastinal shift identified.    IMPRESSION: New pneumomediastinum as well as subcutaneous emphysema. Bilateral lung parenchymal airspace opacities are stable on the right and improved on the left.     DISCRETE X-RAY DATA:  Percent of LEFT lung opacification: 1-33%  Percent of RIGHT lung opacification: 1-33%  Change in lung opacification from most recent x-ray (<=3 days): Decrease  Change from prior dated 3 or more days (same admission): Stable                CLARK MAUREEN   This document has been electronically signed. Apr 22 2020  8:12AM                < end of copied text >    ECHO:    IMPRESSION: 49y Male PAST MEDICAL & SURGICAL HISTORY:  No pertinent past medical history  No significant past surgical history   p/w           IMP:  This is a 49 yr old man with prior mentioned medical condition admitted with acute hypoxic resp failure due to b/l pna secondary to covid-19 infection. He was intubated 4/20.    S/p IL-6, plaquenil and  steroids     -acute hypoxic resp failure  -b/l pna  -covid-19 infection  -ARDS  -septic shock    Plan:  -continue vent support with lung protective strategy  -sedated / pain control  -continue pressors to maintain MAP>65  -continue anticoag  -ngt feed  -trend biomarkers      d/c with icu team

## 2020-04-22 NOTE — PROGRESS NOTE ADULT - SUBJECTIVE AND OBJECTIVE BOX
INTERVAL /OVERNIGHT EVENTS: 49 year old male sedated and vented.  Pt was placed supine today.     PRESSORS: [ x] YES [ ] NO  WHICH: Pheny    ANTIBIOTICS:                  DATE STARTED:  ANTIBIOTICS:                  DATE STARTED:  ANTIBIOTICS:                  DATE STARTED:    Antimicrobial:    Cardiovascular:  phenylephrine    Infusion 0.2 MICROgram(s)/kG/Min IV Continuous <Continuous>    Pulmonary:    Hematalogic:  enoxaparin Injectable 70 milliGRAM(s) SubCutaneous every 12 hours    Other:  acetaminophen   Tablet .. 650 milliGRAM(s) Oral every 6 hours PRN  chlorhexidine 0.12% Liquid 15 milliLiter(s) Oral Mucosa every 12 hours  chlorhexidine 2% Cloths 1 Application(s) Topical <User Schedule>  HYDROmorphone Infusion 1 mG/Hr IV Continuous <Continuous>  lactulose Syrup 10 Gram(s) Oral every 12 hours  midazolam Infusion 0.02 mG/kG/Hr IV Continuous <Continuous>  pantoprazole  Injectable 40 milliGRAM(s) IV Push daily  propofol Infusion 10 MICROgram(s)/kG/Min IV Continuous <Continuous>  rocuronium Infusion 8 MICROgram(s)/kG/Min IV Continuous <Continuous>  sodium chloride 0.9% lock flush 10 milliLiter(s) IV Push every 1 hour PRN    acetaminophen   Tablet .. 650 milliGRAM(s) Oral every 6 hours PRN  chlorhexidine 0.12% Liquid 15 milliLiter(s) Oral Mucosa every 12 hours  chlorhexidine 2% Cloths 1 Application(s) Topical <User Schedule>  enoxaparin Injectable 70 milliGRAM(s) SubCutaneous every 12 hours  HYDROmorphone Infusion 1 mG/Hr IV Continuous <Continuous>  lactulose Syrup 10 Gram(s) Oral every 12 hours  midazolam Infusion 0.02 mG/kG/Hr IV Continuous <Continuous>  pantoprazole  Injectable 40 milliGRAM(s) IV Push daily  phenylephrine    Infusion 0.2 MICROgram(s)/kG/Min IV Continuous <Continuous>  propofol Infusion 10 MICROgram(s)/kG/Min IV Continuous <Continuous>  rocuronium Infusion 8 MICROgram(s)/kG/Min IV Continuous <Continuous>  sodium chloride 0.9% lock flush 10 milliLiter(s) IV Push every 1 hour PRN    Drug Dosing Weight  Height (cm): 157.48 (15 Apr 2020 13:15)  Weight (kg): 70.8 (15 Apr 2020 13:15)  BMI (kg/m2): 28.5 (15 Apr 2020 13:15)  BSA (m2): 1.72 (15 Apr 2020 13:15)    CENTRAL LINE: [x ] YES [ ] NO  LOCATION:   DATE INSERTED:  REMOVE: [ ] YES [ ] NO  EXPLAIN:    DIGGS: [x ] YES [ ] NO    DATE INSERTED:  REMOVE:  [ ] YES [ ] NO  EXPLAIN:    A-LINE:  [ x] YES [ ] NO  LOCATION:   DATE INSERTED:  REMOVE:  [ ] YES [ ] NO  EXPLAIN:      ICU Vital Signs Last 24 Hrs  T(C): 37 (22 Apr 2020 12:00), Max: 38.9 (22 Apr 2020 00:00)  T(F): 98.6 (22 Apr 2020 12:00), Max: 102.1 (22 Apr 2020 00:00)  HR: 114 (22 Apr 2020 12:00) (75 - 114)  BP: --  BP(mean): --  ABP: 97/61 (22 Apr 2020 12:00) (83/68 - 114/74)  ABP(mean): 71 (22 Apr 2020 12:00) (65 - 83)  RR: 35 (22 Apr 2020 12:00) (35 - 35)  SpO2: 97% (22 Apr 2020 12:00) (95% - 98%)      ABG - ( 22 Apr 2020 04:12 )  pH, Arterial: 7.33  pH, Blood: x     /  pCO2: 54    /  pO2: 107   / HCO3: 28    / Base Excess: 1.0   /  SaO2: 97                    04-21 @ 07:01  -  04-22 @ 07:00  --------------------------------------------------------  IN: 1060 mL / OUT: 1250 mL / NET: -190 mL        Mode: AC/ CMV (Assist Control/ Continuous Mandatory Ventilation)  RR (machine): 35  TV (machine): 420  FiO2: 80  PEEP: 12  PIP: 39      PHYSICAL EXAM:    GENERAL:   HEAD: atraumatic, normocephalic   EYES: PERRLA, white sclera.   ENMT: nasal mucosa- Oral cavity-   NECK: supple, JVD/ no JVD, thyroid-   LYMPH: no palpable lymph nodes     SKIN: warm, dry   CHEST/LUNG: ET tube: size        cm at lip. No Chest deformity , no chest tenderness. bilateral breath sounds,no  adventitious sounds  HEART: RRR, no m/r/g   ABDOMEN: soft, nontender, nondistended; bowel sounds.  :diggs catheter.  EXTREMITIES: +1 non-pitting edema, no cyanosis, no clubbing.  NEURO: AA0X , mood/ affect-, no focal neuro deficits        LABS:  CBC Full  -  ( 22 Apr 2020 05:43 )  WBC Count : 24.46 K/uL  RBC Count : 5.50 M/uL  Hemoglobin : 16.0 g/dL  Hematocrit : 49.6 %  Platelet Count - Automated : 847 K/uL  Mean Cell Volume : 90.2 fl  Mean Cell Hemoglobin : 29.1 pg  Mean Cell Hemoglobin Concentration : 32.3 gm/dL  Auto Neutrophil # : 20.78 K/uL  Auto Lymphocyte # : 1.90 K/uL  Auto Monocyte # : 0.81 K/uL  Auto Eosinophil # : 0.31 K/uL  Auto Basophil # : 0.06 K/uL  Auto Neutrophil % : 84.9 %  Auto Lymphocyte % : 7.8 %  Auto Monocyte % : 3.3 %  Auto Eosinophil % : 1.3 %  Auto Basophil % : 0.2 %    04-22    141  |  105  |  24<H>  ----------------------------<  87  4.2   |  26  |  0.89    Ca    8.1<L>      22 Apr 2020 05:43  Phos  5.0     04-22  Mg     2.6     04-22    TPro  6.6  /  Alb  2.6<L>  /  TBili  0.8  /  DBili  x   /  AST  45<H>  /  ALT  163<H>  /  AlkPhos  241<H>  04-22            RADIOLOGY & ADDITIONAL STUDIES REVIEWED:   < from: Xray Chest 1 View- PORTABLE-Routine (04.22.20 @ 07:35) >    EXAM:  XR CHEST PORTABLE ROUTINE 1V                            PROCEDURE DATE:  04/22/2020          INTERPRETATION:  INDICATION: Covid pneumonia; follow-up assessment.    PRIORS: 4/20/2020 9:01 PM    VIEWS: Portable AP radiography of the chest performed.    FINDINGS: No change in position of indwelling ETT and right IJ CVC. No change in position of indwelling NGT. Heart size appears within normal limits. No superior mediastinal widening identified.  New Pneumomediastinum as well as subcutaneousemphysema identified. Bilateral lung parenchymal airspace opacities are stable on the right and improved on the left. No pleural effusion identified. No evidence of pneumothorax. No mediastinal shift identified.    IMPRESSION: New pneumomediastinum as well as subcutaneous emphysema. Bilateral lung parenchymal airspace opacities are stable on the right and improved on the left.     DISCRETE X-RAY DATA:  Percent of LEFT lung opacification: 1-33%  Percent of RIGHT lung opacification: 1-33%  Change in lung opacification from most recent x-ray (<=3 days): Decrease  Change from prior dated 3 or more days (same admission): Stable    < end of copied text >      [ ]GOALS OF CARE DISCUSSION WITH PATIENT/FAMILY/PROXY:    CRITICAL CARE TIME SPENT: 35 minutes

## 2020-04-23 NOTE — PROGRESS NOTE ADULT - ASSESSMENT
Neuro  - sedated  on propofol, versed, and Dilaudid      Cardiovascular  - on pheny  - keep map >65    Pulmonary  - Acute hypoxic respiratory failure secondary to covid  - ABG - pH, Arterial: 7.33  pH, Blood: x     /  pCO2: 54    /  pO2: 107   / HCO3: 28    / Base Excess: 1.0   /  SaO2: 97   vent 35/420/60/10- (fio2 decreased)  Pt s/p proning yesterday.   - + new sc emphysema and pneumo - peep decreased    Infections  - COVID-19 viral pneumonia. Completed Plaquenil, Solumedrol and Tocilizumab.    - monitor crp, ferritin, procalcitonin.  CRP trending down    Nephro  - No issues. Monitor urine output.     Gastrointestinal  - NPO   - pt was proned yesterday    Heme  - Elevated D-dimer > 1000, started full dose anticoagulation.     Endocrine  - Monitor finger sticks between 120-180.     Prophylaxis   - Full dose anticoagulation for elevated D-dimer. Neuro  - sedated  on propofol, versed, and Dilaudid      Cardiovascular  - on pheny  - keep map >65    Pulmonary  - Acute hypoxic respiratory failure secondary to covid  -   ABG - ( 23 Apr 2020 13:36 )  pH, Arterial: 7.31  pH, Blood: x     /  pCO2: 51    /  pO2: 74    / HCO3: 25    / Base Excess: -1.5  /  SaO2: 94     vent 35/420/60/10- (fio2 decreased)    - follow up xrays and abgs    Infections  - COVID-19 viral pneumonia. Completed Plaquenil, Solumedrol and Tocilizumab.    - monitor crp, ferritin, procalcitonin.  CRP trending down    Nephro  - No issues. Monitor urine output.     Gastrointestinal  - TF    Heme  - Elevated D-dimer > 1000, started full dose anticoagulation.     Endocrine  - Monitor finger sticks between 120-180.     Prophylaxis   - Full dose anticoagulation for elevated D-dimer.

## 2020-04-23 NOTE — PROGRESS NOTE ADULT - ATTENDING COMMENTS
49M a/w hypoxic resp failure 2/2 Covid PNA s/p intubation 4/20 after decompensation on the floor  - sedated with propofol, versed, dilaudid  - ABG reviewed, vent adjusted utilizing lung protective strategy to current settings of 35/420/60/10 with Pplat 23 - monitor without proning today  - s/p course plaquinil, solu-medrol and toci; inflammatory markers downtrending  - protonix gi ppx, can start tube feeds if remains stable supine  - renal function stable with adequate UOP - continue to monitor  - leukocytosis and procal uptrending without fevers - cultures pending from 4/22, monitor off abx pending results  - full dose lovenox for elevated ddimer/hypercoag state of Covid infection

## 2020-04-23 NOTE — PROGRESS NOTE ADULT - SUBJECTIVE AND OBJECTIVE BOX
INTERVAL /OVERNIGHT EVENTS: No acute events overnight.      PRESSORS: [x ] YES [ ] NO  WHICH: Pheny    ANTIBIOTICS:                  DATE STARTED:  ANTIBIOTICS:                  DATE STARTED:  ANTIBIOTICS:                  DATE STARTED:    Antimicrobial:    Cardiovascular:  phenylephrine    Infusion 0.2 MICROgram(s)/kG/Min IV Continuous <Continuous>    Pulmonary:    Hematalogic:  enoxaparin Injectable 70 milliGRAM(s) SubCutaneous every 12 hours    Other:  acetaminophen   Tablet .. 650 milliGRAM(s) Oral every 6 hours PRN  chlorhexidine 0.12% Liquid 15 milliLiter(s) Oral Mucosa every 12 hours  chlorhexidine 2% Cloths 1 Application(s) Topical <User Schedule>  HYDROmorphone Infusion 1 mG/Hr IV Continuous <Continuous>  lactulose Syrup 10 Gram(s) Oral every 12 hours  midazolam Infusion 0.02 mG/kG/Hr IV Continuous <Continuous>  pantoprazole  Injectable 40 milliGRAM(s) IV Push daily  propofol Infusion 10 MICROgram(s)/kG/Min IV Continuous <Continuous>  rocuronium Infusion 8 MICROgram(s)/kG/Min IV Continuous <Continuous>  sodium chloride 0.9% lock flush 10 milliLiter(s) IV Push every 1 hour PRN  vasopressin Infusion 0.04 Unit(s)/Min IV Continuous <Continuous>    acetaminophen   Tablet .. 650 milliGRAM(s) Oral every 6 hours PRN  chlorhexidine 0.12% Liquid 15 milliLiter(s) Oral Mucosa every 12 hours  chlorhexidine 2% Cloths 1 Application(s) Topical <User Schedule>  enoxaparin Injectable 70 milliGRAM(s) SubCutaneous every 12 hours  HYDROmorphone Infusion 1 mG/Hr IV Continuous <Continuous>  lactulose Syrup 10 Gram(s) Oral every 12 hours  midazolam Infusion 0.02 mG/kG/Hr IV Continuous <Continuous>  pantoprazole  Injectable 40 milliGRAM(s) IV Push daily  phenylephrine    Infusion 0.2 MICROgram(s)/kG/Min IV Continuous <Continuous>  propofol Infusion 10 MICROgram(s)/kG/Min IV Continuous <Continuous>  rocuronium Infusion 8 MICROgram(s)/kG/Min IV Continuous <Continuous>  sodium chloride 0.9% lock flush 10 milliLiter(s) IV Push every 1 hour PRN  vasopressin Infusion 0.04 Unit(s)/Min IV Continuous <Continuous>    Drug Dosing Weight  Height (cm): 157.48 (15 Apr 2020 13:15)  Weight (kg): 70.8 (15 Apr 2020 13:15)  BMI (kg/m2): 28.5 (15 Apr 2020 13:15)  BSA (m2): 1.72 (15 Apr 2020 13:15)    CENTRAL LINE: [x ] YES [ ] NO  LOCATION:   DATE INSERTED:  REMOVE: [ ] YES [ ] NO  EXPLAIN:    DIGGS: [x ] YES [ ] NO    DATE INSERTED:  REMOVE:  [ ] YES [ ] NO  EXPLAIN:    A-LINE:  [ x] YES [ ] NO  LOCATION:   DATE INSERTED:  REMOVE:  [ ] YES [ ] NO  EXPLAIN:      ICU Vital Signs Last 24 Hrs  T(C): 37.2 (23 Apr 2020 04:00), Max: 38.7 (22 Apr 2020 20:00)  T(F): 98.9 (23 Apr 2020 04:00), Max: 101.6 (22 Apr 2020 20:00)  HR: 108 (23 Apr 2020 12:00) (100 - 128)  BP: --  BP(mean): --  ABP: 114/73 (23 Apr 2020 12:00) (90/56 - 114/73)  ABP(mean): 78 (23 Apr 2020 08:00) (69 - 83)  RR: 35 (23 Apr 2020 12:00) (35 - 35)  SpO2: 94% (23 Apr 2020 12:00) (93% - 97%)      ABG - ( 23 Apr 2020 13:36 )  pH, Arterial: 7.31  pH, Blood: x     /  pCO2: 51    /  pO2: 74    / HCO3: 25    / Base Excess: -1.5  /  SaO2: 94                    04-22 @ 07:01  -  04-23 @ 07:00  --------------------------------------------------------  IN: 1990.2 mL / OUT: 850 mL / NET: 1140.2 mL        Mode: AC/ CMV (Assist Control/ Continuous Mandatory Ventilation)  RR (machine): 35  TV (machine): 420  FiO2: 60  PEEP: 10  MAP: 18  PIP: 28      PHYSICAL EXAM:    GENERAL:   HEAD: atraumatic, normocephalic   EYES: PERRLA, white sclera.   ENMT: nasal mucosa- Oral cavity-   NECK: supple, JVD/ no JVD, thyroid-   LYMPH: no palpable lymph nodes     SKIN: warm, dry   CHEST/LUNG: ET tube: size        cm at lip. No Chest deformity , no chest tenderness. bilateral breath sounds,no  adventitious sounds  HEART: RRR, no m/r/g   ABDOMEN: soft, nontender, nondistended; bowel sounds.  :diggs catheter.  EXTREMITIES: +1 non-pitting edema, no cyanosis, no clubbing.  NEURO: AA0X , mood/ affect-, no focal neuro deficits        LABS:  CBC Full  -  ( 23 Apr 2020 06:28 )  WBC Count : 29.44 K/uL  RBC Count : 5.25 M/uL  Hemoglobin : 15.3 g/dL  Hematocrit : 48.8 %  Platelet Count - Automated : 697 K/uL  Mean Cell Volume : 93.0 fl  Mean Cell Hemoglobin : 29.1 pg  Mean Cell Hemoglobin Concentration : 31.4 gm/dL  Auto Neutrophil # : 27.16 K/uL  Auto Lymphocyte # : 0.89 K/uL  Auto Monocyte # : 0.69 K/uL  Auto Eosinophil # : 0.04 K/uL  Auto Basophil # : 0.06 K/uL  Auto Neutrophil % : 92.4 %  Auto Lymphocyte % : 3.0 %  Auto Monocyte % : 2.3 %  Auto Eosinophil % : 0.1 %  Auto Basophil % : 0.2 %    04-23    147<H>  |  111<H>  |  33<H>  ----------------------------<  130<H>  4.4   |  25  |  1.03    Ca    8.3<L>      23 Apr 2020 06:28  Phos  3.6     04-23  Mg     2.9     04-23    TPro  6.4  /  Alb  2.4<L>  /  TBili  0.7  /  DBili  x   /  AST  48<H>  /  ALT  106<H>  /  AlkPhos  217<H>  04-23            RADIOLOGY & ADDITIONAL STUDIES REVIEWED:     [ ]GOALS OF CARE DISCUSSION WITH PATIENT/FAMILY/PROXY:    CRITICAL CARE TIME SPENT: 35 minutes INTERVAL /OVERNIGHT EVENTS: No acute events overnight.      PRESSORS: [x ] YES [ ] NO  WHICH: Pheny    ANTIBIOTICS:                  DATE STARTED:  ANTIBIOTICS:                  DATE STARTED:  ANTIBIOTICS:                  DATE STARTED:    Antimicrobial:    Cardiovascular:  phenylephrine    Infusion 0.2 MICROgram(s)/kG/Min IV Continuous <Continuous>    Pulmonary:    Hematalogic:  enoxaparin Injectable 70 milliGRAM(s) SubCutaneous every 12 hours    Other:  acetaminophen   Tablet .. 650 milliGRAM(s) Oral every 6 hours PRN  chlorhexidine 0.12% Liquid 15 milliLiter(s) Oral Mucosa every 12 hours  chlorhexidine 2% Cloths 1 Application(s) Topical <User Schedule>  HYDROmorphone Infusion 1 mG/Hr IV Continuous <Continuous>  lactulose Syrup 10 Gram(s) Oral every 12 hours  midazolam Infusion 0.02 mG/kG/Hr IV Continuous <Continuous>  pantoprazole  Injectable 40 milliGRAM(s) IV Push daily  propofol Infusion 10 MICROgram(s)/kG/Min IV Continuous <Continuous>  rocuronium Infusion 8 MICROgram(s)/kG/Min IV Continuous <Continuous>  sodium chloride 0.9% lock flush 10 milliLiter(s) IV Push every 1 hour PRN  vasopressin Infusion 0.04 Unit(s)/Min IV Continuous <Continuous>    acetaminophen   Tablet .. 650 milliGRAM(s) Oral every 6 hours PRN  chlorhexidine 0.12% Liquid 15 milliLiter(s) Oral Mucosa every 12 hours  chlorhexidine 2% Cloths 1 Application(s) Topical <User Schedule>  enoxaparin Injectable 70 milliGRAM(s) SubCutaneous every 12 hours  HYDROmorphone Infusion 1 mG/Hr IV Continuous <Continuous>  lactulose Syrup 10 Gram(s) Oral every 12 hours  midazolam Infusion 0.02 mG/kG/Hr IV Continuous <Continuous>  pantoprazole  Injectable 40 milliGRAM(s) IV Push daily  phenylephrine    Infusion 0.2 MICROgram(s)/kG/Min IV Continuous <Continuous>  propofol Infusion 10 MICROgram(s)/kG/Min IV Continuous <Continuous>  rocuronium Infusion 8 MICROgram(s)/kG/Min IV Continuous <Continuous>  sodium chloride 0.9% lock flush 10 milliLiter(s) IV Push every 1 hour PRN  vasopressin Infusion 0.04 Unit(s)/Min IV Continuous <Continuous>    Drug Dosing Weight  Height (cm): 157.48 (15 Apr 2020 13:15)  Weight (kg): 70.8 (15 Apr 2020 13:15)  BMI (kg/m2): 28.5 (15 Apr 2020 13:15)  BSA (m2): 1.72 (15 Apr 2020 13:15)    CENTRAL LINE: [x ] YES [ ] NO  LOCATION:   DATE INSERTED:  REMOVE: [ ] YES [ ] NO  EXPLAIN:    DIGGS: [x ] YES [ ] NO    DATE INSERTED:  REMOVE:  [ ] YES [ ] NO  EXPLAIN:    A-LINE:  [ x] YES [ ] NO  LOCATION:   DATE INSERTED:  REMOVE:  [ ] YES [ ] NO  EXPLAIN:      ICU Vital Signs Last 24 Hrs  T(C): 37.2 (23 Apr 2020 04:00), Max: 38.7 (22 Apr 2020 20:00)  T(F): 98.9 (23 Apr 2020 04:00), Max: 101.6 (22 Apr 2020 20:00)  HR: 108 (23 Apr 2020 12:00) (100 - 128)  BP: --  BP(mean): --  ABP: 114/73 (23 Apr 2020 12:00) (90/56 - 114/73)  ABP(mean): 78 (23 Apr 2020 08:00) (69 - 83)  RR: 35 (23 Apr 2020 12:00) (35 - 35)  SpO2: 94% (23 Apr 2020 12:00) (93% - 97%)      ABG - ( 23 Apr 2020 13:36 )  pH, Arterial: 7.31  pH, Blood: x     /  pCO2: 51    /  pO2: 74    / HCO3: 25    / Base Excess: -1.5  /  SaO2: 94                    04-22 @ 07:01  -  04-23 @ 07:00  --------------------------------------------------------  IN: 1990.2 mL / OUT: 850 mL / NET: 1140.2 mL        Mode: AC/ CMV (Assist Control/ Continuous Mandatory Ventilation)  RR (machine): 35  TV (machine): 420  FiO2: 60  PEEP: 10  MAP: 18  PIP: 28      PHYSICAL EXAM:    GENERAL:   HEAD: atraumatic, normocephalic   ENMT: nasal mucosa- Oral cavity- dry   SKIN: warm, dry   CHEST/LUNG: ET tube in place   HEART: RRR, no m/r/g   ABDOMEN: soft, nontender, nondistended; bowel sounds.  : diggs catheter.  EXTREMITIES: no edema, no cyanosis, no clubbing.  NEURO: sedated        LABS:  CBC Full  -  ( 23 Apr 2020 06:28 )  WBC Count : 29.44 K/uL  RBC Count : 5.25 M/uL  Hemoglobin : 15.3 g/dL  Hematocrit : 48.8 %  Platelet Count - Automated : 697 K/uL  Mean Cell Volume : 93.0 fl  Mean Cell Hemoglobin : 29.1 pg  Mean Cell Hemoglobin Concentration : 31.4 gm/dL  Auto Neutrophil # : 27.16 K/uL  Auto Lymphocyte # : 0.89 K/uL  Auto Monocyte # : 0.69 K/uL  Auto Eosinophil # : 0.04 K/uL  Auto Basophil # : 0.06 K/uL  Auto Neutrophil % : 92.4 %  Auto Lymphocyte % : 3.0 %  Auto Monocyte % : 2.3 %  Auto Eosinophil % : 0.1 %  Auto Basophil % : 0.2 %    04-23    147<H>  |  111<H>  |  33<H>  ----------------------------<  130<H>  4.4   |  25  |  1.03    Ca    8.3<L>      23 Apr 2020 06:28  Phos  3.6     04-23  Mg     2.9     04-23    TPro  6.4  /  Alb  2.4<L>  /  TBili  0.7  /  DBili  x   /  AST  48<H>  /  ALT  106<H>  /  AlkPhos  217<H>  04-23            RADIOLOGY & ADDITIONAL STUDIES REVIEWED:   < from: Xray Chest 1 View- PORTABLE-Routine (04.23.20 @ 07:56) >    EXAM:  XR CHEST PORTABLE ROUTINE 1V                            PROCEDURE DATE:  04/23/2020          INTERPRETATION:  PROCEDURE: AP view of the chest.    CLINICAL INFORMATION: Respiratory failure.    COMPARISON: 4/22/2020.    FINDINGS:     Endotracheal tube, gastric tube, and right internal jugular central venous catheter are noted.    Lungs: There are stable bilateral lung opacities.  Heart: The heart is normal in size.  Mediastinum: The mediastinum is within normal limits.    IMPRESSION:    Findings of stable bilateral lung opacities are suspicious for pneumonia.    < end of copied text >      [ ]GOALS OF CARE DISCUSSION WITH PATIENT/FAMILY/PROXY:    CRITICAL CARE TIME SPENT: 35 minutes

## 2020-04-24 NOTE — PROGRESS NOTE ADULT - SUBJECTIVE AND OBJECTIVE BOX
INTERVAL /OVERNIGHT EVENTS: Pt sedated and on pressors.      PRESSORS: [x ] YES [ ] NO  WHICH:    ANTIBIOTICS:                  DATE STARTED:  ANTIBIOTICS:                  DATE STARTED:  ANTIBIOTICS:                  DATE STARTED:    Antimicrobial:  ceFAZolin   IVPB      ceFAZolin   IVPB 1000 milliGRAM(s) IV Intermittent every 8 hours    Cardiovascular:  phenylephrine    Infusion 0.2 MICROgram(s)/kG/Min IV Continuous <Continuous>    Pulmonary:    Hematalogic:  enoxaparin Injectable 70 milliGRAM(s) SubCutaneous every 12 hours    Other:  acetaminophen   Tablet .. 650 milliGRAM(s) Oral every 6 hours PRN  chlorhexidine 0.12% Liquid 15 milliLiter(s) Oral Mucosa every 12 hours  chlorhexidine 2% Cloths 1 Application(s) Topical <User Schedule>  HYDROmorphone Infusion 1 mG/Hr IV Continuous <Continuous>  lactulose Syrup 10 Gram(s) Oral every 12 hours  midazolam Infusion 0.02 mG/kG/Hr IV Continuous <Continuous>  pantoprazole  Injectable 40 milliGRAM(s) IV Push daily  propofol Infusion 10 MICROgram(s)/kG/Min IV Continuous <Continuous>  sodium chloride 0.9% lock flush 10 milliLiter(s) IV Push every 1 hour PRN  vasopressin Infusion 0.04 Unit(s)/Min IV Continuous <Continuous>    acetaminophen   Tablet .. 650 milliGRAM(s) Oral every 6 hours PRN  ceFAZolin   IVPB      ceFAZolin   IVPB 1000 milliGRAM(s) IV Intermittent every 8 hours  chlorhexidine 0.12% Liquid 15 milliLiter(s) Oral Mucosa every 12 hours  chlorhexidine 2% Cloths 1 Application(s) Topical <User Schedule>  enoxaparin Injectable 70 milliGRAM(s) SubCutaneous every 12 hours  HYDROmorphone Infusion 1 mG/Hr IV Continuous <Continuous>  lactulose Syrup 10 Gram(s) Oral every 12 hours  midazolam Infusion 0.02 mG/kG/Hr IV Continuous <Continuous>  pantoprazole  Injectable 40 milliGRAM(s) IV Push daily  phenylephrine    Infusion 0.2 MICROgram(s)/kG/Min IV Continuous <Continuous>  propofol Infusion 10 MICROgram(s)/kG/Min IV Continuous <Continuous>  sodium chloride 0.9% lock flush 10 milliLiter(s) IV Push every 1 hour PRN  vasopressin Infusion 0.04 Unit(s)/Min IV Continuous <Continuous>    Drug Dosing Weight  Height (cm): 157.48 (15 Apr 2020 13:15)  Weight (kg): 70.8 (15 Apr 2020 13:15)  BMI (kg/m2): 28.5 (15 Apr 2020 13:15)  BSA (m2): 1.72 (15 Apr 2020 13:15)    CENTRAL LINE: [x ] YES [ ] NO  LOCATION:   DATE INSERTED:  REMOVE: [ ] YES [ ] NO  EXPLAIN:    DIGGS: [ x] YES [ ] NO    DATE INSERTED:  REMOVE:  [ ] YES [ ] NO  EXPLAIN:    A-LINE:  [x ] YES [ ] NO  LOCATION:   DATE INSERTED:  REMOVE:  [ ] YES [ ] NO  EXPLAIN:    ICU Vital Signs Last 24 Hrs  T(C): 37.5 (24 Apr 2020 12:00), Max: 39.1 (24 Apr 2020 00:06)  T(F): 99.5 (24 Apr 2020 12:00), Max: 102.3 (24 Apr 2020 00:06)  HR: 91 (24 Apr 2020 12:00) (91 - 135)  BP: --  BP(mean): --  ABP: 113/66 (24 Apr 2020 12:00) (98/70 - 120/82)  ABP(mean): --  RR: 35 (24 Apr 2020 12:00) (30 - 35)  SpO2: 95% (24 Apr 2020 12:00) (94% - 97%)      ABG - ( 24 Apr 2020 04:04 )  pH, Arterial: 7.25  pH, Blood: x     /  pCO2: 71    /  pO2: 84    / HCO3: 30    / Base Excess: 0.8   /  SaO2: 95                    04-23 @ 07:01  -  04-24 @ 07:00  --------------------------------------------------------  IN: 852.4 mL / OUT: 1335 mL / NET: -482.6 mL        Mode: AC/ CMV (Assist Control/ Continuous Mandatory Ventilation)  RR (machine): 35  TV (machine): 400  FiO2: 60  PEEP: 10  ITime: 1  MAP: 19  PIP: 30      PHYSICAL EXAM:    GENERAL:   HEAD: atraumatic, normocephalic   EYES: PERRLA, white sclera.   ENMT: nasal mucosa- Oral cavity-   NECK: supple, JVD/ no JVD, thyroid-   LYMPH: no palpable lymph nodes     SKIN: warm, dry   CHEST/LUNG: ET tube: size        cm at lip. No Chest deformity , no chest tenderness. bilateral breath sounds,no  adventitious sounds  HEART: RRR, no m/r/g   ABDOMEN: soft, nontender, nondistended; bowel sounds.  :diggs catheter.  EXTREMITIES: +1 non-pitting edema, no cyanosis, no clubbing.  NEURO: AA0X , mood/ affect-, no focal neuro deficits        LABS:  CBC Full  -  ( 24 Apr 2020 07:26 )  WBC Count : 33.21 K/uL  RBC Count : 4.65 M/uL  Hemoglobin : 13.5 g/dL  Hematocrit : 44.2 %  Platelet Count - Automated : 662 K/uL  Mean Cell Volume : 95.1 fl  Mean Cell Hemoglobin : 29.0 pg  Mean Cell Hemoglobin Concentration : 30.5 gm/dL  Auto Neutrophil # : 30.59 K/uL  Auto Lymphocyte # : 0.90 K/uL  Auto Monocyte # : 1.02 K/uL  Auto Eosinophil # : 0.01 K/uL  Auto Basophil # : 0.06 K/uL  Auto Neutrophil % : 92.1 %  Auto Lymphocyte % : 2.7 %  Auto Monocyte % : 3.1 %  Auto Eosinophil % : 0.0 %  Auto Basophil % : 0.2 %    04-24    149<H>  |  114<H>  |  28<H>  ----------------------------<  140<H>  4.5   |  29  |  0.83    Ca    9.0      24 Apr 2020 07:26  Phos  1.8     04-24  Mg     2.7     04-24    TPro  6.0  /  Alb  2.5<L>  /  TBili  0.6  /  DBili  x   /  AST  35  /  ALT  76<H>  /  AlkPhos  216<H>  04-24        Culture Results:   No growth to date. (04-23 @ 09:58)  Culture Results:   No growth to date. (04-23 @ 09:58)      RADIOLOGY & ADDITIONAL STUDIES REVIEWED:     [ ]GOALS OF CARE DISCUSSION WITH PATIENT/FAMILY/PROXY:    CRITICAL CARE TIME SPENT: 35 minutes

## 2020-04-24 NOTE — PROGRESS NOTE ADULT - ASSESSMENT
Neuro  - sedated  on propofol, versed, and Dilaudid      Cardiovascular  - on pheny  - keep map >65    Pulmonary  - Acute hypoxic respiratory failure secondary to covid  - ABG - ( 24 Apr 2020 04:04 )  pH, Arterial: 7.25  pH, Blood: x     /  pCO2: 71    /  pO2: 84    / HCO3: 30    / Base Excess: 0.8   /  SaO2: 95     vent 35/420/60/10- (fio2 decreased)    - follow up xrays and abgs  - dc paralytics    Infections  - COVID-19 viral pneumonia. Completed Plaquenil, Solumedrol and Tocilizumab.    - monitor crp, ferritin, procalcitonin.  CRP trending down    Nephro  - No issues. Monitor urine output.     Gastrointestinal  - TF    Heme  - Elevated D-dimer > 1000, started full dose anticoagulation.     Endocrine  - Monitor finger sticks between 120-180.     Prophylaxis   - Full dose anticoagulation for elevated D-dimer.

## 2020-04-25 NOTE — PROGRESS NOTE ADULT - ASSESSMENT
Neuro  - sedated  on propofol, versed, and Dilaudid    - d/c versed     Cardiovascular  - on levo  - keep map >65    Pulmonary  - Acute hypoxic respiratory failure secondary to covid  -ABG - ( 25 Apr 2020 04:44 )  pH, Arterial: 7.34  pH, Blood: x     /  pCO2: 54    /  pO2: 74    / HCO3: 30    / Base Excess: 3.5   /  SaO2: 95          vent 35/400/70/10    - follow up xrays and abgs      Infections  - COVID-19 viral pneumonia. Completed Plaquenil, Solumedrol and Tocilizumab.    - monitor crp, ferritin, procalcitonin.  CRP trending down    Nephro  - No issues. Monitor urine output.     Gastrointestinal  - TF    Heme  - Elevated D-dimer > 1000, started full dose anticoagulation.     Endocrine  - Monitor finger sticks between 120-180.     Prophylaxis   - Full dose anticoagulation for elevated D-dimer.

## 2020-04-25 NOTE — PROGRESS NOTE ADULT - SUBJECTIVE AND OBJECTIVE BOX
INTERVAL /OVERNIGHT EVENTS: Pt sedated and on pressors.    questionable aspiration overnight   desaturation, increased fio2    PRESSORS: [x ] YES [ ] NO  WHICH:    ANTIBIOTICS:                  DATE STARTED:  ANTIBIOTICS:                  DATE STARTED:  ANTIBIOTICS:                  DATE STARTED:    Antimicrobial:  ceFAZolin   IVPB      ceFAZolin   IVPB 1000 milliGRAM(s) IV Intermittent every 8 hours    Cardiovascular:  levophed    Pulmonary:    Hematalogic:  enoxaparin Injectable 70 milliGRAM(s) SubCutaneous every 12 hours    Other:  acetaminophen   Tablet .. 650 milliGRAM(s) Oral every 6 hours PRN  chlorhexidine 0.12% Liquid 15 milliLiter(s) Oral Mucosa every 12 hours  chlorhexidine 2% Cloths 1 Application(s) Topical <User Schedule>  HYDROmorphone Infusion 1 mG/Hr IV Continuous <Continuous>  lactulose Syrup 10 Gram(s) Oral every 12 hours  midazolam Infusion 0.02 mG/kG/Hr IV Continuous <Continuous>  pantoprazole  Injectable 40 milliGRAM(s) IV Push daily  propofol Infusion 10 MICROgram(s)/kG/Min IV Continuous <Continuous>  sodium chloride 0.9% lock flush 10 milliLiter(s) IV Push every 1 hour PRN  vasopressin Infusion 0.04 Unit(s)/Min IV Continuous <Continuous>    acetaminophen   Tablet .. 650 milliGRAM(s) Oral every 6 hours PRN  ceFAZolin   IVPB      ceFAZolin   IVPB 1000 milliGRAM(s) IV Intermittent every 8 hours  chlorhexidine 0.12% Liquid 15 milliLiter(s) Oral Mucosa every 12 hours  chlorhexidine 2% Cloths 1 Application(s) Topical <User Schedule>  enoxaparin Injectable 70 milliGRAM(s) SubCutaneous every 12 hours  HYDROmorphone Infusion 1 mG/Hr IV Continuous <Continuous>  lactulose Syrup 10 Gram(s) Oral every 12 hours  midazolam Infusion 0.02 mG/kG/Hr IV Continuous <Continuous>  pantoprazole  Injectable 40 milliGRAM(s) IV Push daily  phenylephrine    Infusion 0.2 MICROgram(s)/kG/Min IV Continuous <Continuous>  propofol Infusion 10 MICROgram(s)/kG/Min IV Continuous <Continuous>  sodium chloride 0.9% lock flush 10 milliLiter(s) IV Push every 1 hour PRN  vasopressin Infusion 0.04 Unit(s)/Min IV Continuous <Continuous>    Drug Dosing Weight  Height (cm): 157.48 (15 Apr 2020 13:15)  Weight (kg): 70.8 (15 Apr 2020 13:15)  BMI (kg/m2): 28.5 (15 Apr 2020 13:15)  BSA (m2): 1.72 (15 Apr 2020 13:15)    CENTRAL LINE: [x ] YES [ ] NO  LOCATION:   DATE INSERTED:  REMOVE: [ ] YES [ ] NO  EXPLAIN:    DIGGS: [ x] YES [ ] NO    DATE INSERTED:  REMOVE:  [ ] YES [ ] NO  EXPLAIN:    A-LINE:  [x ] YES [ ] NO  LOCATION:   DATE INSERTED:  REMOVE:  [ ] YES [ ] NO  EXPLAIN:    ICU Vital Signs Last 24 Hrs  T(C): 37.6 (25 Apr 2020 12:00), Max: 38.1 (25 Apr 2020 04:00)  T(F): 99.7 (25 Apr 2020 12:00), Max: 100.5 (25 Apr 2020 04:00)  HR: 119 (25 Apr 2020 12:00) (81 - 119)  BP: --  BP(mean): --  ABP: 107/62 (25 Apr 2020 12:00) (94/57 - 118/62)  ABP(mean): 75 (25 Apr 2020 04:00) (70 - 75)  RR: 30 (25 Apr 2020 12:00) (30 - 36)  SpO2: 91% (25 Apr 2020 12:00) (91% - 113%)    ABG - ( 25 Apr 2020 04:44 )  pH, Arterial: 7.34  pH, Blood: x     /  pCO2: 54    /  pO2: 74    / HCO3: 30    / Base Excess: 3.5   /  SaO2: 95            I&O's Detail    24 Apr 2020 07:01  -  25 Apr 2020 07:00  --------------------------------------------------------  IN:    Enteral Tube Flush: 100 mL    HYDROmorphone  Infusion: 9 mL    HYDROmorphone Infusion.: 8 mL    midazolam Infusion: 42 mL    ns in tub fed  [id number]: 70 mL    phenylephrine   Infusion: 277.5 mL    propofol Infusion: 92.9 mL    Solution: 499.8 mL    Solution: 100 mL    Solution: 50 mL    Solution: 250 mL    vasopressin Infusion: 28.8 mL  Total IN: 1528 mL    OUT:    Indwelling Catheter - Urethral: 750 mL  Total OUT: 750 mL    Total NET: 778 mL          Mode: AC/ CMV (Assist Control/ Continuous Mandatory Ventilation)  RR (machine): 35  TV (machine): 400  FiO2: 60  PEEP: 10  ITime: 1  MAP: 17  PIP: 27      PHYSICAL EXAM:    GENERAL:   HEAD: atraumatic, normocephalic   EYES: PERRLA, white sclera.   ENMT: nasal mucosa- Oral cavity-   NECK: supple, JVD/ no JVD, thyroid-   LYMPH: no palpable lymph nodes     SKIN: warm, dry   CHEST/LUNG: ET tube: size        cm at lip. No Chest deformity , no chest tenderness. bilateral breath sounds,no  adventitious sounds  HEART: RRR, no m/r/g   ABDOMEN: soft, nontender, nondistended; bowel sounds.  :diggs catheter.  EXTREMITIES: +1 non-pitting edema, no cyanosis, no clubbing.  NEURO: AA0X , mood/ affect-, no focal neuro deficits        LABS:                        12.1   31.08 )-----------( 527      ( 25 Apr 2020 04:34 )             39.1   04-25    149<H>  |  112<H>  |  26<H>  ----------------------------<  128<H>  4.0   |  35<H>  |  0.57    Ca    8.1<L>      25 Apr 2020 04:34  Phos  1.8     04-25  Mg     2.3     04-25    TPro  5.9<L>  /  Alb  2.5<L>  /  TBili  0.7  /  DBili  x   /  AST  38  /  ALT  66<H>  /  AlkPhos  219<H>  04-25        RADIOLOGY & ADDITIONAL STUDIES REVIEWED:     [ ]GOALS OF CARE DISCUSSION WITH PATIENT/FAMILY/PROXY:    CRITICAL CARE TIME SPENT: 35 minutes

## 2020-04-26 NOTE — PROGRESS NOTE ADULT - SUBJECTIVE AND OBJECTIVE BOX
INTERVAL /OVERNIGHT EVENTS:   Pt sedated and on pressors.    Rocuronium, vasopressin, phenylephrine d/c'ed.  Tmax 103 overnight, remained febrile with IV tylenol, requiring cooling blanket.     PRESSORS: [x ] YES [ ] NO  WHICH: Norepinephrine     Antimicrobial:  Cefepime 2000 milliGRAM(s) IV intermittent, every 8 hours    Cardiovascular:  Norepinephrine Infusion 0.05 MICROgram(s)/kG/Min (3.32 mL/Hr) IV Continuous <Continuous>    Pulmonary:    Hematalogic:  enoxaparin Injectable 70 milliGRAM(s) SubCutaneous every 12 hours    Other:  acetaminophen  IVPB .. 1000 milliGRAM(s) IV Intermittent once  chlorhexidine 0.12% Liquid 15 milliLiter(s) Oral Mucosa every 12 hours  chlorhexidine 2% Cloths 1 Application(s) Topical <User Schedule>  enoxaparin Injectable 70 milliGRAM(s) SubCutaneous every 12 hours  HYDROmorphone Infusion. 2 mG/Hr (2 mL/Hr) IV Continuous <Continuous>  lactulose Syrup 10 Gram(s) Oral every 12 hours  pantoprazole  Injectable 40 milliGRAM(s) IV Push daily  propofol Infusion 10 MICROgram(s)/kG/Min (4.25 mL/Hr) IV Continuous <Continuous>    Drug Dosing Weight  Height (cm): 157.48 (15 Apr 2020 13:15)  Weight (kg): 70.8 (15 Apr 2020 13:15)  BMI (kg/m2): 28.5 (15 Apr 2020 13:15)  BSA (m2): 1.72 (15 Apr 2020 13:15)    CENTRAL LINE: [x ] YES [ ] NO  LOCATION: Right IJ  DATE INSERTED: 4/20/2020  REMOVE: [ ] YES [ ] NO  EXPLAIN:    DIGGS: [ x] YES [ ] NO    DATE INSERTED:  REMOVE:  [ ] YES [ ] NO  EXPLAIN:    A-LINE:  [x ] YES [ ] NO  LOCATION: Left radial  DATE INSERTED: 4/21/2020  REMOVE:  [ ] YES [ ] NO  EXPLAIN:    ICU Vital Signs Last 24 Hrs  T(C): 39.8 (26 Apr 2020 00:00), Max: 39.8 (25 Apr 2020 19:40)  T(F): 103.7 (26 Apr 2020 00:00), Max: 103.7 (25 Apr 2020 19:40)  HR: 125 (26 Apr 2020 00:00) (91 - 135)  BP: --  BP(mean): --  ABP: 105/63 (26 Apr 2020 00:00) (102/59 - 118/62)  ABP(mean): 76 (26 Apr 2020 00:00) (71 - 76)  RR: 40 (26 Apr 2020 00:00) (30 - 40)  SpO2: 96% (26 Apr 2020 00:00) (91% - 99%)    Blood Gas Profile - Arterial in AM (04.25.20 @ 04:44)    pH, Arterial: 7.34    pCO2, Arterial: 54 mmHg    pO2, Arterial: 74 mmHg    HCO3, Arterial: 30 mmol/L    Base Excess, Arterial: 3.5 mmol/L    Oxygen Saturation, Arterial: 95 %    Blood Gas Comments Arterial: A-LINE, AC35/400/100/8    I&O's Detail    24 Apr 2020 07:01  -  25 Apr 2020 07:00  --------------------------------------------------------  IN:    Enteral Tube Flush: 100 mL    HYDROmorphone  Infusion: 9 mL    HYDROmorphone Infusion.: 8 mL    midazolam Infusion: 42 mL    ns in tub fed  [id number]: 70 mL    phenylephrine   Infusion: 277.5 mL    propofol Infusion: 92.9 mL    Solution: 499.8 mL    Solution: 100 mL    Solution: 50 mL    Solution: 250 mL    vasopressin Infusion: 28.8 mL  Total IN: 1528 mL    OUT:    Indwelling Catheter - Urethral: 750 mL  Total OUT: 750 mL    Total NET: 778 mL      25 Apr 2020 07:01  -  26 Apr 2020 02:39  --------------------------------------------------------  IN:    HYDROmorphone Infusion.: 8 mL    norepinephrine Infusion: 218.9 mL    ns in tub fed  [id number]: 80 mL    propofol Infusion: 204 mL    Solution: 100 mL  Total IN: 610.9 mL    OUT:    Indwelling Catheter - Urethral: 420 mL  Total OUT: 420 mL    Total NET: 190.9 mL    Mode: AC/ CMV (Assist Control/ Continuous Mandatory Ventilation)  RR (machine): 35  TV (machine): 400  FiO2: 70  PEEP: 10  ITime: 1  MAP: 18  PIP: 31    PHYSICAL EXAM:    GENERAL: Intubated, NAD  HEAD: atraumatic, normocephalic   EYES: PERRLA, white sclera.   ENMT: nasal mucosa- Oral cavity-   NECK: supple, JVD/ no JVD, thyroid-   LYMPH: no palpable lymph nodes     SKIN: warm, dry   CHEST/LUNG: No Chest deformity , no chest tenderness. bilateral breath sounds,no  adventitious sounds  HEART: RRR, no m/r/g   ABDOMEN: soft, nontender, nondistended; bowel sounds.  :diggs catheter.  EXTREMITIES: +1 non-pitting edema, no cyanosis, no clubbing.  NEURO: AA0X , mood/ affect-, no focal neuro deficits                          12.1   31.08 )-----------( 527      ( 25 Apr 2020 04:34 )             39.1     04-25    149<H>  |  112<H>  |  26<H>  ----------------------------<  128<H>  4.0   |  35<H>  |  0.57    Ca    8.1<L>      25 Apr 2020 04:34  Phos  1.8     04-25  Mg     2.3     04-25    TPro  5.9<L>  /  Alb  2.5<L>  /  TBili  0.7  /  DBili  x   /  AST  38  /  ALT  66<H>  /  AlkPhos  219<H>  04-25    RADIOLOGY & ADDITIONAL STUDIES REVIEWED:   < from: Xray Chest 1 View- PORTABLE-Routine (04.25.20 @ 07:05) >  Findings:  Stable support lines and tubes. Bilateral lung disease is not significantly changed from yesterday. No obvious effusions.    < end of copied text > INTERVAL /OVERNIGHT EVENTS:   Pt sedated and on pressors.    Rocuronium, vasopressin, phenylephrine d/c'ed.  Tmax 103 overnight, remained febrile with IV tylenol, requiring cooling blanket.     PRESSORS: [x ] YES [ ] NO  WHICH: Norepinephrine     Antimicrobial:  Cefepime 2000 milliGRAM(s) IV intermittent, every 8 hours    Cardiovascular:  Norepinephrine Infusion 0.05 MICROgram(s)/kG/Min (3.32 mL/Hr) IV Continuous <Continuous>    Pulmonary:    Hematalogic:  enoxaparin Injectable 70 milliGRAM(s) SubCutaneous every 12 hours    Other:  acetaminophen  IVPB .. 1000 milliGRAM(s) IV Intermittent once  chlorhexidine 0.12% Liquid 15 milliLiter(s) Oral Mucosa every 12 hours  chlorhexidine 2% Cloths 1 Application(s) Topical <User Schedule>  enoxaparin Injectable 70 milliGRAM(s) SubCutaneous every 12 hours  HYDROmorphone Infusion. 2 mG/Hr (2 mL/Hr) IV Continuous <Continuous>  lactulose Syrup 10 Gram(s) Oral every 12 hours  pantoprazole  Injectable 40 milliGRAM(s) IV Push daily  propofol Infusion 10 MICROgram(s)/kG/Min (4.25 mL/Hr) IV Continuous <Continuous>    Drug Dosing Weight  Height (cm): 157.48 (15 Apr 2020 13:15)  Weight (kg): 70.8 (15 Apr 2020 13:15)  BMI (kg/m2): 28.5 (15 Apr 2020 13:15)  BSA (m2): 1.72 (15 Apr 2020 13:15)    CENTRAL LINE: [x ] YES [ ] NO  LOCATION: Right IJ  DATE INSERTED: 4/20/2020  REMOVE: [ ] YES [ ] NO  EXPLAIN:    DIGGS: [ x] YES [ ] NO    DATE INSERTED:  REMOVE:  [ ] YES [ ] NO  EXPLAIN:    A-LINE:  [x ] YES [ ] NO  LOCATION: Left radial  DATE INSERTED: 4/21/2020  REMOVE:  [ ] YES [ ] NO  EXPLAIN:    ICU Vital Signs Last 24 Hrs  T(C): 39.8 (26 Apr 2020 00:00), Max: 39.8 (25 Apr 2020 19:40)  T(F): 103.7 (26 Apr 2020 00:00), Max: 103.7 (25 Apr 2020 19:40)  HR: 125 (26 Apr 2020 00:00) (91 - 135)  BP: --  BP(mean): --  ABP: 105/63 (26 Apr 2020 00:00) (102/59 - 118/62)  ABP(mean): 76 (26 Apr 2020 00:00) (71 - 76)  RR: 40 (26 Apr 2020 00:00) (30 - 40)  SpO2: 96% (26 Apr 2020 00:00) (91% - 99%)    Mode: AC/ CMV (Assist Control/ Continuous Mandatory Ventilation)  RR (machine): 35  TV (machine): 400  FiO2: 70  PEEP: 10  ITime: 1  MAP: 18  PIP: 31    I&O's Detail    24 Apr 2020 07:01  -  25 Apr 2020 07:00  --------------------------------------------------------  IN:    Enteral Tube Flush: 100 mL    HYDROmorphone  Infusion: 9 mL    HYDROmorphone Infusion.: 8 mL    midazolam Infusion: 42 mL    ns in tub fed  [id number]: 70 mL    phenylephrine   Infusion: 277.5 mL    propofol Infusion: 92.9 mL    Solution: 499.8 mL    Solution: 100 mL    Solution: 50 mL    Solution: 250 mL    vasopressin Infusion: 28.8 mL  Total IN: 1528 mL    OUT:    Indwelling Catheter - Urethral: 750 mL  Total OUT: 750 mL    Total NET: 778 mL      25 Apr 2020 07:01  -  26 Apr 2020 02:39  --------------------------------------------------------  IN:    HYDROmorphone Infusion.: 8 mL    norepinephrine Infusion: 218.9 mL    ns in tub fed  [id number]: 80 mL    propofol Infusion: 204 mL    Solution: 100 mL  Total IN: 610.9 mL    OUT:    Indwelling Catheter - Urethral: 420 mL  Total OUT: 420 mL    Total NET: 190.9 mL    Mode: AC/ CMV (Assist Control/ Continuous Mandatory Ventilation)  RR (machine): 35  TV (machine): 400  FiO2: 70  PEEP: 10  ITime: 1  MAP: 18  PIP: 31    PHYSICAL EXAM:    GENERAL: Intubated, NAD  HEAD: atraumatic, normocephalic   EYES: PERRLA, white sclera.   ENMT: nasal mucosa- Oral cavity-   NECK: supple, JVD/ no JVD, thyroid-   LYMPH: no palpable lymph nodes     SKIN: warm, dry   CHEST/LUNG: No Chest deformity , no chest tenderness. bilateral breath sounds,no  adventitious sounds  HEART: RRR, no m/r/g   ABDOMEN: soft, nontender, nondistended; bowel sounds.  :diggs catheter.  EXTREMITIES: +1 non-pitting edema, no cyanosis, no clubbing.  NEURO: AA0X , mood/ affect-, no focal neuro deficits                          12.1   31.08 )-----------( 527      ( 25 Apr 2020 04:34 )             39.1     04-25    149<H>  |  112<H>  |  26<H>  ----------------------------<  128<H>  4.0   |  35<H>  |  0.57    Ca    8.1<L>      25 Apr 2020 04:34  Phos  1.8     04-25  Mg     2.3     04-25    TPro  5.9<L>  /  Alb  2.5<L>  /  TBili  0.7  /  DBili  x   /  AST  38  /  ALT  66<H>  /  AlkPhos  219<H>  04-25    ABG - ( 26 Apr 2020 03:42 )  pH, Arterial: 7.44  pH, Blood: x     /  pCO2: 47    /  pO2: 64    / HCO3: 31    / Base Excess: 6.7   /  SaO2: 94        RADIOLOGY & ADDITIONAL STUDIES REVIEWED:   < from: Xray Chest 1 View- PORTABLE-Routine (04.25.20 @ 07:05) >  Findings:  Stable support lines and tubes. Bilateral lung disease is not significantly changed from yesterday. No obvious effusions.    < end of copied text > INTERVAL /OVERNIGHT EVENTS:   Pt sedated and on pressors.    Rocuronium, vasopressin, phenylephrine d/c'ed.  Tmax 103 overnight, remained febrile with IV tylenol, requiring cooling blanket.     PRESSORS: [x ] YES [ ] NO  WHICH: Norepinephrine     Antimicrobial:  Cefepime 2000 milliGRAM(s) IV intermittent, every 8 hours    Cardiovascular:  Norepinephrine Infusion 0.05 MICROgram(s)/kG/Min (3.32 mL/Hr) IV Continuous <Continuous>    Pulmonary:    Hematalogic:  enoxaparin Injectable 70 milliGRAM(s) SubCutaneous every 12 hours    Other:  acetaminophen  IVPB .. 1000 milliGRAM(s) IV Intermittent once  chlorhexidine 0.12% Liquid 15 milliLiter(s) Oral Mucosa every 12 hours  chlorhexidine 2% Cloths 1 Application(s) Topical <User Schedule>  enoxaparin Injectable 70 milliGRAM(s) SubCutaneous every 12 hours  HYDROmorphone Infusion. 2 mG/Hr (2 mL/Hr) IV Continuous <Continuous>  lactulose Syrup 10 Gram(s) Oral every 12 hours  pantoprazole  Injectable 40 milliGRAM(s) IV Push daily  propofol Infusion 10 MICROgram(s)/kG/Min (4.25 mL/Hr) IV Continuous <Continuous>    Drug Dosing Weight  Height (cm): 157.48 (15 Apr 2020 13:15)  Weight (kg): 70.8 (15 Apr 2020 13:15)  BMI (kg/m2): 28.5 (15 Apr 2020 13:15)  BSA (m2): 1.72 (15 Apr 2020 13:15)    CENTRAL LINE: [x ] YES [ ] NO  LOCATION: Right IJ  DATE INSERTED: 4/20/2020  REMOVE: [ ] YES [ ] NO  EXPLAIN:    DIGGS: [ x] YES [ ] NO    DATE INSERTED:  REMOVE:  [ ] YES [ ] NO  EXPLAIN:    A-LINE:  [x ] YES [ ] NO  LOCATION: Left radial  DATE INSERTED: 4/21/2020  REMOVE:  [ ] YES [ ] NO  EXPLAIN:    ICU Vital Signs Last 24 Hrs  T(C): 39.8 (26 Apr 2020 00:00), Max: 39.8 (25 Apr 2020 19:40)  T(F): 103.7 (26 Apr 2020 00:00), Max: 103.7 (25 Apr 2020 19:40)  HR: 125 (26 Apr 2020 00:00) (91 - 135)  BP: --  BP(mean): --  ABP: 105/63 (26 Apr 2020 00:00) (102/59 - 118/62)  ABP(mean): 76 (26 Apr 2020 00:00) (71 - 76)  RR: 40 (26 Apr 2020 00:00) (30 - 40)  SpO2: 96% (26 Apr 2020 00:00) (91% - 99%)    Mode: AC/ CMV (Assist Control/ Continuous Mandatory Ventilation)  RR (machine): 35  TV (machine): 400  FiO2: 80  PEEP: 10  ITime: 1  MAP: 18  PIP: 31    I&O's Detail    24 Apr 2020 07:01  -  25 Apr 2020 07:00  --------------------------------------------------------  IN:    Enteral Tube Flush: 100 mL    HYDROmorphone  Infusion: 9 mL    HYDROmorphone Infusion.: 8 mL    midazolam Infusion: 42 mL    ns in tub fed  [id number]: 70 mL    phenylephrine   Infusion: 277.5 mL    propofol Infusion: 92.9 mL    Solution: 499.8 mL    Solution: 100 mL    Solution: 50 mL    Solution: 250 mL    vasopressin Infusion: 28.8 mL  Total IN: 1528 mL    OUT:    Indwelling Catheter - Urethral: 750 mL  Total OUT: 750 mL    Total NET: 778 mL      25 Apr 2020 07:01  -  26 Apr 2020 02:39  --------------------------------------------------------  IN:    HYDROmorphone Infusion.: 8 mL    norepinephrine Infusion: 218.9 mL    ns in tub fed  [id number]: 80 mL    propofol Infusion: 204 mL    Solution: 100 mL  Total IN: 610.9 mL    OUT:    Indwelling Catheter - Urethral: 420 mL  Total OUT: 420 mL    Total NET: 190.9 mL    Mode: AC/ CMV (Assist Control/ Continuous Mandatory Ventilation)  RR (machine): 35  TV (machine): 400  FiO2: 70  PEEP: 10  ITime: 1  MAP: 18  PIP: 31    PHYSICAL EXAM:    GENERAL: Intubated, NAD  HEAD: atraumatic, normocephalic   EYES: PERRLA, white sclera.   ENMT: nasal mucosa- Oral cavity-   NECK: supple, JVD/ no JVD, thyroid-   LYMPH: no palpable lymph nodes     SKIN: warm, dry   CHEST/LUNG: No Chest deformity , no chest tenderness. bilateral breath sounds,no  adventitious sounds  HEART: RRR, no m/r/g   ABDOMEN: soft, nontender, nondistended; bowel sounds.  :diggs catheter.  EXTREMITIES: +1 non-pitting edema, no cyanosis, no clubbing.  NEURO: AA0X , mood/ affect-, no focal neuro deficits                          12.1   31.08 )-----------( 527      ( 25 Apr 2020 04:34 )             39.1     04-25    149<H>  |  112<H>  |  26<H>  ----------------------------<  128<H>  4.0   |  35<H>  |  0.57    Ca    8.1<L>      25 Apr 2020 04:34  Phos  1.8     04-25  Mg     2.3     04-25    TPro  5.9<L>  /  Alb  2.5<L>  /  TBili  0.7  /  DBili  x   /  AST  38  /  ALT  66<H>  /  AlkPhos  219<H>  04-25    ABG - ( 26 Apr 2020 03:42 )  pH, Arterial: 7.44  pH, Blood: x     /  pCO2: 47    /  pO2: 64    / HCO3: 31    / Base Excess: 6.7   /  SaO2: 94        RADIOLOGY & ADDITIONAL STUDIES REVIEWED:   < from: Xray Chest 1 View- PORTABLE-Routine (04.25.20 @ 07:05) >  Findings:  Stable support lines and tubes. Bilateral lung disease is not significantly changed from yesterday. No obvious effusions.    < end of copied text >

## 2020-04-26 NOTE — PROGRESS NOTE ADULT - SUBJECTIVE AND OBJECTIVE BOX
Time of Visit:  Patient seen and examined.     MEDICATIONS  (STANDING):  cefepime   IVPB 2000 milliGRAM(s) IV Intermittent every 8 hours  chlorhexidine 0.12% Liquid 15 milliLiter(s) Oral Mucosa every 12 hours  chlorhexidine 2% Cloths 1 Application(s) Topical <User Schedule>  enoxaparin Injectable 70 milliGRAM(s) SubCutaneous every 12 hours  HYDROmorphone Infusion. 2 mG/Hr (2 mL/Hr) IV Continuous <Continuous>  lactulose Syrup 10 Gram(s) Oral every 12 hours  norepinephrine Infusion 0.05 MICROgram(s)/kG/Min (3.32 mL/Hr) IV Continuous <Continuous>  pantoprazole  Injectable 40 milliGRAM(s) IV Push daily  propofol Infusion 10 MICROgram(s)/kG/Min (4.25 mL/Hr) IV Continuous <Continuous>  sodium chloride 0.45% with potassium chloride 20 mEq/L 1000 milliLiter(s) (80 mL/Hr) IV Continuous <Continuous>      MEDICATIONS  (PRN):  sodium chloride 0.9% lock flush 10 milliLiter(s) IV Push every 1 hour PRN Pre/post blood products, medications, blood draw, and to maintain line patency       Medications up to date at time of exam.      PHYSICAL EXAMINATION:  Patient has no new complaints.  GENERAL: The patient is a well-developed, well-nourished, in no apparent distress.     Vital Signs Last 24 Hrs  T(C): 36.7 (26 Apr 2020 12:00), Max: 39.8 (25 Apr 2020 19:40)  T(F): 98 (26 Apr 2020 12:00), Max: 103.7 (25 Apr 2020 19:40)  HR: 78 (26 Apr 2020 09:56) (78 - 135)  BP: --  BP(mean): --  RR: 30 (26 Apr 2020 12:00) (30 - 40)  SpO2: 93% (26 Apr 2020 12:00) (93% - 98%)  Mode: AC/ CMV (Assist Control/ Continuous Mandatory Ventilation)  RR (machine): 35  TV (machine): 400  FiO2: 70  PEEP: 8  ITime: 1  MAP: 16  PIP: 28   (if applicable)    Chest Tube (if applicable)    HEENT: Head is normocephalic and atraumatic. Extraocular muscles are intact. Mucous membranes are moist.  +ETT    NECK: Supple, no palpable adenopathy.    LUNGS: Clear to auscultation, no wheezing, rales, or rhonchi.    HEART: Regular rate and rhythm without murmur.    ABDOMEN: Soft, nontender, and nondistended.  No hepatosplenomegaly is noted.    : No painful voiding, no pelvic pain    EXTREMITIES: Without any cyanosis, clubbing, rash, lesions or edema.    NEUROLOGIC: sedated    SKIN: Warm, dry, good turgor.      LABS:                        11.3   26.48 )-----------( 395      ( 26 Apr 2020 05:41 )             36.7     04-26    153<H>  |  114<H>  |  21<H>  ----------------------------<  150<H>  3.0<L>   |  33<H>  |  0.66    Ca    7.8<L>      26 Apr 2020 05:41  Phos  1.4     04-26  Mg     2.2     04-26    TPro  5.8<L>  /  Alb  2.5<L>  /  TBili  1.2  /  DBili  x   /  AST  44<H>  /  ALT  57  /  AlkPhos  236<H>  04-26    PT/INR - ( 25 Apr 2020 04:34 )   PT: 13.5 sec;   INR: 1.19 ratio         PTT - ( 25 Apr 2020 04:34 )  PTT:32.9 sec    ABG - ( 26 Apr 2020 03:42 )  pH, Arterial: 7.44  pH, Blood: x     /  pCO2: 47    /  pO2: 64    / HCO3: 31    / Base Excess: 6.7   /  SaO2: 94                          Procalcitonin, Serum: 0.37 ng/mL (04-25-20 @ 10:31)  Procalcitonin, Serum: 0.50 ng/mL (04-24-20 @ 14:00)      MICROBIOLOGY: (if applicable)    RADIOLOGY & ADDITIONAL STUDIES:  EKG:   CXR:< from: Xray Chest 1 View- PORTABLE-Routine (04.25.20 @ 07:05) >    EXAM:  XR CHEST PORTABLE ROUTINE 1V                            PROCEDURE DATE:  04/25/2020          INTERPRETATION:    Examination: XR CHEST    History: ADMDIAG1: J18.9 PNEUMONIA, UNSPECIFIED ORGANISM/, + covid    Findings:  Stable support lines and tubes. Bilateral lung disease is not significantly changed from yesterday. No obvious effusions.    Impression:  1.  Stable chest      DISCRETE X-RAY DATA:  Percent of LEFT lung opacification: 34-66%  Percent of RIGHT lung opacification: 34-66%  Change in lung opacification from most recent x-ray (<=3 days): Stable  Change from prior dated 3 or more days (same admission): Stable                  SELVIN ROGERS   This document has been electronically signed. Apr 25 2020  9:24AM                < end of copied text >    ECHO:    IMPRESSION: 49y Male PAST MEDICAL & SURGICAL HISTORY:  No pertinent past medical history  No significant past surgical history   p/w       IMP:  This is a 49 yr old man with prior mentioned medical condition admitted with acute hypoxic resp failure due to b/l pna secondary to covid-19 infection. He was intubated 4/20.    S/p IL-6, plaquenil and  steroids     -acute hypoxic resp failure  -b/l pna  -covid-19 infection  -ARDS  -septic shock    Plan:  -continue vent support with lung protective strategy  -sedated / pain control  -continue pressors to maintain MAP>65  -continue anticoag  -ngt feed  -trend biomarkers  -continue antibx  -f/u cultures      d/c with icu team

## 2020-04-26 NOTE — PROGRESS NOTE ADULT - ASSESSMENT
Neuro  - sedated on propofol, and Dilaudid      Cardiovascular  - on levo  - keep map >65    Pulmonary  - Acute hypoxic respiratory failure secondary to covid  -ABG - ( 25 Apr 2020 04:44 )  pH, Arterial: 7.34  pH, Blood: x     /  pCO2: 54    /  pO2: 74    / HCO3: 30    / Base Excess: 3.5   /  SaO2: 95          vent 35/400/70/10    - follow up CXR and abgs    Infections  - COVID-19 viral pneumonia. Completed Plaquenil, Solumedrol and Tocilizumab.    - monitor crp, ferritin, procalcitonin.  CRP trending down  - IV tylenol q6 prn fever  - cooling blanket prn    Nephro  - No issues. Monitor urine output.     Gastrointestinal  - TF    Heme  - Elevated D-dimer > 1000, continue full dose anticoagulation.     Endocrine  - Monitor finger sticks between 120-180.     Prophylaxis   - Full dose anticoagulation for elevated D-dimer. Neuro  - sedated on propofol, and Dilaudid      Cardiovascular  - on levo  - keep map >65    Pulmonary  - Acute hypoxic respiratory failure secondary to covid  - ABG - ( 26 Apr 2020 03:42 )  pH, Arterial: 7.44  pH, Blood: x     /  pCO2: 47    /  pO2: 64    / HCO3: 31    / Base Excess: 6.7   /  SaO2: 94  - Vent 35/400/70/12    - follow up CXR    Infections  - COVID-19 viral pneumonia. Completed Plaquenil, Solumedrol and Tocilizumab.    - monitor crp, ferritin, procalcitonin.  CRP trending down  - IV tylenol q6 prn fever  - cooling blanket prn    Nephro  - No issues. Monitor urine output.     Gastrointestinal  - TF    Heme  - Elevated D-dimer > 1000, continue full dose anticoagulation.     Endocrine  - Monitor finger sticks between 120-180.     Prophylaxis   - Full dose anticoagulation for elevated D-dimer. Neuro  - sedated on propofol, and Dilaudid      Cardiovascular  - on levo  - keep map >65    Pulmonary  - Acute hypoxic respiratory failure secondary to covid  - ABG - ( 26 Apr 2020 03:42 )  pH, Arterial: 7.44  pH, Blood: x     /  pCO2: 47    /  pO2: 64    / HCO3: 31    / Base Excess: 6.7   /  SaO2: 94  - Vent 35/400/80/10    - follow up CXR    Infections  - COVID-19 viral pneumonia. Completed Plaquenil, Solumedrol and Tocilizumab.    - monitor crp, ferritin, procalcitonin.  CRP trending down  - IV tylenol q6 prn fever  - cooling blanket prn    Nephro  - No issues. Monitor urine output.     Gastrointestinal  - TF    Heme  - Elevated D-dimer > 1000, continue full dose anticoagulation.     Endocrine  - Monitor finger sticks between 120-180.     Prophylaxis   - Full dose anticoagulation for elevated D-dimer.

## 2020-04-27 NOTE — PROGRESS NOTE ADULT - SUBJECTIVE AND OBJECTIVE BOX
Time of Visit:  Patient seen and examined.     MEDICATIONS  (STANDING):  cefepime   IVPB 2000 milliGRAM(s) IV Intermittent every 8 hours  chlorhexidine 0.12% Liquid 15 milliLiter(s) Oral Mucosa every 12 hours  chlorhexidine 2% Cloths 1 Application(s) Topical <User Schedule>  enoxaparin Injectable 70 milliGRAM(s) SubCutaneous every 12 hours  HYDROmorphone Infusion. 2 mG/Hr (2 mL/Hr) IV Continuous <Continuous>  lactulose Syrup 10 Gram(s) Oral every 12 hours  midazolam Infusion 0.02 mG/kG/Hr (1.42 mL/Hr) IV Continuous <Continuous>  norepinephrine Infusion 0.05 MICROgram(s)/kG/Min (3.32 mL/Hr) IV Continuous <Continuous>  pantoprazole  Injectable 40 milliGRAM(s) IV Push daily  propofol Infusion 10 MICROgram(s)/kG/Min (4.25 mL/Hr) IV Continuous <Continuous>      MEDICATIONS  (PRN):  sodium chloride 0.9% lock flush 10 milliLiter(s) IV Push every 1 hour PRN Pre/post blood products, medications, blood draw, and to maintain line patency       Medications up to date at time of exam.      PHYSICAL EXAMINATION:  Patient has no new complaints.  GENERAL: The patient is a well-developed, well-nourished, in no apparent distress.     Vital Signs Last 24 Hrs  T(C): 37.2 (27 Apr 2020 16:00), Max: 37.6 (27 Apr 2020 12:49)  T(F): 99 (27 Apr 2020 16:00), Max: 99.7 (27 Apr 2020 12:49)  HR: 87 (27 Apr 2020 16:00) (76 - 106)  BP: --  BP(mean): --  RR: 37 (27 Apr 2020 16:00) (37 - 37)  SpO2: 94% (27 Apr 2020 16:00) (88% - 95%)  Mode: AC/ CMV (Assist Control/ Continuous Mandatory Ventilation)  RR (machine): 35  TV (machine): 400  FiO2: 80  PEEP: 8  ITime: 1  MAP: 16  PIP: 27   (if applicable)    Chest Tube (if applicable)    HEENT: Head is normocephalic and atraumatic. Extraocular muscles are intact. Mucous membranes are moist.  +ETT    NECK: Supple, no palpable adenopathy.    LUNGS: Clear to auscultation, no wheezing, rales, or rhonchi.    HEART: Regular rate and rhythm without murmur.    ABDOMEN: Soft, nontender, and nondistended.  No hepatosplenomegaly is noted.    : No painful voiding, no pelvic pain    EXTREMITIES: Without any cyanosis, clubbing, rash, lesions or edema.    NEUROLOGIC: + sedated    SKIN: Warm, dry, good turgor.      LABS:                        10.7   22.94 )-----------( 328      ( 27 Apr 2020 06:04 )             34.6     04-27    152<H>  |  109<H>  |  15  ----------------------------<  110<H>  3.7   |  34<H>  |  0.40<L>    Ca    7.9<L>      27 Apr 2020 06:04  Phos  2.4     04-27  Mg     2.2     04-27    TPro  5.4<L>  /  Alb  2.2<L>  /  TBili  1.0  /  DBili  x   /  AST  73<H>  /  ALT  72<H>  /  AlkPhos  213<H>  04-27        ABG - ( 27 Apr 2020 03:58 )  pH, Arterial: 7.39  pH, Blood: x     /  pCO2: 53    /  pO2: 60    / HCO3: 31    / Base Excess: 5.6   /  SaO2: 90                          Procalcitonin, Serum: 0.37 ng/mL (04-25-20 @ 10:31)      MICROBIOLOGY: (if applicable)    RADIOLOGY & ADDITIONAL STUDIES:  EKG:   CXR:  ECHO:    IMPRESSION: 49y Male PAST MEDICAL & SURGICAL HISTORY:  No pertinent past medical history  No significant past surgical history   p/w         IMP:  This is a 49 yr old man with prior mentioned medical condition admitted with acute hypoxic resp failure due to b/l pna secondary to covid-19 infection. He was intubated 4/20.    S/p IL-6, plaquenil and  steroids     -acute hypoxic resp failure  -b/l pna  -covid-19 infection  -ARDS  -septic shock    Plan:  -continue vent support with lung protective strategy  -sedated / pain control  -continue pressors to maintain MAP>65  -continue anticoag  -ngt feed  -trend biomarkers  -continue antibx  -f/u cultures      d/c with icu team

## 2020-04-27 NOTE — PROGRESS NOTE ADULT - ASSESSMENT
Neuro  - sedated on propofol, and Dilaudid      Cardiovascular  - on levo  - keep map >65    Pulmonary  - Acute hypoxic respiratory failure secondary to covid  - ABG - ( 27 Apr 2020 03:58 )  pH, Arterial: 7.39  pH, Blood: x     /  pCO2: 53    /  pO2: 60    / HCO3: 31    / Base Excess: 5.6   /  SaO2: 90      - Vent 35/400/70/8      Infections  - COVID-19 viral pneumonia. Completed Plaquenil, Solumedrol and Tocilizumab.    - monitor crp, ferritin, procalcitonin.  CRP trending down  - IV tylenol q6 prn fever      Nephro  - No issues. Monitor urine output.     Gastrointestinal  - Restart TF    Heme  - Elevated D-dimer > 1000, continue full dose anticoagulation.     Endocrine  - Monitor finger sticks between 120-180.     Prophylaxis   - Full dose anticoagulation for elevated D-dimer.

## 2020-04-27 NOTE — PROGRESS NOTE ADULT - SUBJECTIVE AND OBJECTIVE BOX
INTERVAL /OVERNIGHT EVENTS:   Pt sedated and on pressors.    Afeb x24h    PRESSORS: [x ] YES [ ] NO  WHICH: Norepinephrine     Antimicrobial:  Cefepime 2000 milliGRAM(s) IV intermittent, every 8 hours    Cardiovascular:  Norepinephrine Infusion 0.05 MICROgram(s)/kG/Min (3.32 mL/Hr) IV Continuous <Continuous>    Pulmonary:    Hematalogic:  enoxaparin Injectable 70 milliGRAM(s) SubCutaneous every 12 hours    Other:  acetaminophen  IVPB .. 1000 milliGRAM(s) IV Intermittent once  chlorhexidine 0.12% Liquid 15 milliLiter(s) Oral Mucosa every 12 hours  chlorhexidine 2% Cloths 1 Application(s) Topical <User Schedule>  enoxaparin Injectable 70 milliGRAM(s) SubCutaneous every 12 hours  HYDROmorphone Infusion. 2 mG/Hr (2 mL/Hr) IV Continuous <Continuous>  lactulose Syrup 10 Gram(s) Oral every 12 hours  pantoprazole  Injectable 40 milliGRAM(s) IV Push daily  propofol Infusion 10 MICROgram(s)/kG/Min (4.25 mL/Hr) IV Continuous <Continuous>    Drug Dosing Weight  Height (cm): 157.48 (15 Apr 2020 13:15)  Weight (kg): 70.8 (15 Apr 2020 13:15)  BMI (kg/m2): 28.5 (15 Apr 2020 13:15)  BSA (m2): 1.72 (15 Apr 2020 13:15)    CENTRAL LINE: [x ] YES [ ] NO  LOCATION: Right IJ  DATE INSERTED: 4/20/2020  REMOVE: [ ] YES [ ] NO  EXPLAIN:    DIGGS: [ x] YES [ ] NO    DATE INSERTED:  REMOVE:  [ ] YES [ ] NO  EXPLAIN:    A-LINE:  [x ] YES [ ] NO  LOCATION: Left radial  DATE INSERTED: 4/21/2020  REMOVE:  [ ] YES [ ] NO  EXPLAIN:    ICU Vital Signs Last 24 Hrs  T(C): 37.2 (27 Apr 2020 08:00), Max: 37.2 (27 Apr 2020 08:00)  T(F): 99 (27 Apr 2020 08:00), Max: 99 (27 Apr 2020 08:00)  HR: 91 (27 Apr 2020 10:43) (74 - 91)  BP: --  BP(mean): --  ABP: 92/55 (27 Apr 2020 08:00) (92/55 - 141/78)  ABP(mean): 76 (27 Apr 2020 07:00) (64 - 87)  RR: 32 (26 Apr 2020 20:00) (30 - 33)  SpO2: 92% (27 Apr 2020 10:43) (88% - 95%)    Mode: AC/ CMV (Assist Control/ Continuous Mandatory Ventilation)  RR (machine): 35  TV (machine): 400  FiO2: 80  PEEP: 8  ITime: 1  MAP: 16  PIP: 27    ABG - ( 27 Apr 2020 03:58 )  pH, Arterial: 7.39  pH, Blood: x     /  pCO2: 53    /  pO2: 60    / HCO3: 31    / Base Excess: 5.6   /  SaO2: 90        I&O's Detail    26 Apr 2020 07:01  -  27 Apr 2020 07:00  --------------------------------------------------------  IN:    Enteral Tube Flush: 250 mL    HYDROmorphone Infusion.: 35 mL    norepinephrine Infusion: 348 mL    ns in tub fed  [id number]: 230 mL    propofol Infusion: 439 mL    sodium chloride 0.45% with potassium chloride 20 mEq/L: 1200 mL    Solution: 100 mL    Solution: 500 mL  Total IN: 3102 mL    OUT:    Indwelling Catheter - Urethral: 710 mL  Total OUT: 710 mL    Total NET: 2392 mL    PHYSICAL EXAM:    GENERAL: Intubated, NAD  HEAD: atraumatic, normocephalic   EYES: PERRLA, white sclera.   ENMT: nasal mucosa- Oral cavity-   NECK: supple, JVD/ no JVD, thyroid-   LYMPH: no palpable lymph nodes     SKIN: warm, dry   CHEST/LUNG: No Chest deformity , no chest tenderness. bilateral breath sounds,no  adventitious sounds  HEART: RRR, no m/r/g   ABDOMEN: soft, nontender, nondistended; bowel sounds.  :diggs catheter.  EXTREMITIES: +1 non-pitting edema, no cyanosis, no clubbing.  NEURO: AA0X , mood/ affect-, no focal neuro deficits    Labs:                                 10.7   22.94 )-----------( 328      ( 27 Apr 2020 06:04 )             34.6     04-27    152<H>  |  109<H>  |  15  ----------------------------<  110<H>  3.7   |  34<H>  |  0.40<L>    Ca    7.9<L>      27 Apr 2020 06:04  Phos  2.4     04-27  Mg     2.2     04-27    TPro  5.4<L>  /  Alb  2.2<L>  /  TBili  1.0  /  DBili  x   /  AST  73<H>  /  ALT  72<H>  /  AlkPhos  213<H>  04-27    RADIOLOGY & ADDITIONAL STUDIES REVIEWED:   < from: Xray Chest 1 View- PORTABLE-Routine (04.27.20 @ 08:28) >  Findings:  Intubated with the tip of the endotracheal tube in appropriate position. Feeding tube in the stomach with the tip omitted from the field-of-view. Just central and the tip overlying the SVC.    Bilateral airspace disease, greater on the left, worsened since 4/25/2020.    Impression:  1.  Bilateral airspace disease, greater on the left, worsened since 4/25/2020.    < end of copied text >

## 2020-04-28 NOTE — PROGRESS NOTE ADULT - ASSESSMENT
Neuro  - sedated on propofol, and Dilaudid      Cardiovascular  - on levo  - keep map >65    Pulmonary  - Acute hypoxic respiratory failure secondary to covid  - ABG - ( 28 Apr 2020 03:35 )  pH, Arterial: 7.42  pH, Blood: x     /  pCO2: 50    /  pO2: 71    / HCO3: 32    / Base Excess: 7.0   /  SaO2: 94        Mode: AC/ CMV (Assist Control/ Continuous Mandatory Ventilation)  RR (machine): 35  TV (machine): 400  FiO2: 80  PEEP: 10  ITime: 1  MAP: 16  PIP: 29          Infections  - COVID-19 viral pneumonia. Completed Plaquenil, Solumedrol and Tocilizumab.    - monitor crp, ferritin, procalcitonin.  CRP trending down  - IV tylenol q6 prn fever      Nephro  - No issues. Monitor urine output.     Gastrointestinal  -  TF    Heme  - Elevated D-dimer > 1000, continue full dose anticoagulation.     Endocrine  - Monitor finger sticks between 120-180.     Prophylaxis   - Full dose anticoagulation for elevated D-dimer.

## 2020-04-28 NOTE — CHART NOTE - NSCHARTNOTEFT_GEN_A_CORE
Upon Nutritional Assessment by the Registered Dietitian your patient was determined to meet criteria / has evidence of the following diagnosis/diagnoses:          [ ]  Mild Protein Calorie Malnutrition        [x ]  Moderate Protein Calorie Malnutrition        [ ] Severe Protein Calorie Malnutrition        [ ] Unspecified Protein Calorie Malnutrition        [ ] Underweight / BMI <19        [ ] Morbid Obesity / BMI > 40      Findings as based on:  •  Comprehensive nutrition assessment and consultation  •  Calorie counts (nutrient intake analysis)  •  Food acceptance and intake status from observations by staff  •  Follow up  •  Patient education  •  Intervention secondary to interdisciplinary rounds  •   concerns      Treatment:    The following diet has been recommended:  With Propofol @21 ml/hr: Jevity 1.5 15x24 + 1 Pkt Prosource TID (Jevity 1.5 360 ml, 540 kcals, 23 gm protein). 3 Pkt Prosource add 45 gm protein, 180 kcals. MD to monitor. RD available.     PROVIDER Section:     By signing this assessment you are acknowledging and agree with the diagnosis/diagnoses assigned by the Registered Dietitian    Comments:

## 2020-04-28 NOTE — PROGRESS NOTE ADULT - SUBJECTIVE AND OBJECTIVE BOX
INTERVAL /OVERNIGHT EVENTS:   Pt sedated and on pressors.    Afeb x24h    PRESSORS: [x ] YES [ ] NO  WHICH: Norepinephrine     Antimicrobial:  Cefepime 2000 milliGRAM(s) IV intermittent, every 8 hours    Cardiovascular:  Norepinephrine Infusion 0.05 MICROgram(s)/kG/Min (3.32 mL/Hr) IV Continuous <Continuous>    Pulmonary:    Hematalogic:  enoxaparin Injectable 70 milliGRAM(s) SubCutaneous every 12 hours    Other:  acetaminophen  IVPB .. 1000 milliGRAM(s) IV Intermittent once  chlorhexidine 0.12% Liquid 15 milliLiter(s) Oral Mucosa every 12 hours  chlorhexidine 2% Cloths 1 Application(s) Topical <User Schedule>  enoxaparin Injectable 70 milliGRAM(s) SubCutaneous every 12 hours  HYDROmorphone Infusion. 2 mG/Hr (2 mL/Hr) IV Continuous <Continuous>  lactulose Syrup 10 Gram(s) Oral every 12 hours  pantoprazole  Injectable 40 milliGRAM(s) IV Push daily  propofol Infusion 10 MICROgram(s)/kG/Min (4.25 mL/Hr) IV Continuous <Continuous>    Drug Dosing Weight  Height (cm): 157.48 (15 Apr 2020 13:15)  Weight (kg): 70.8 (15 Apr 2020 13:15)  BMI (kg/m2): 28.5 (15 Apr 2020 13:15)  BSA (m2): 1.72 (15 Apr 2020 13:15)    CENTRAL LINE: [x ] YES [ ] NO  LOCATION: Right IJ  DATE INSERTED: 4/20/2020  REMOVE: [ ] YES [ ] NO  EXPLAIN:    DIGGS: [ x] YES [ ] NO    DATE INSERTED:  REMOVE:  [ ] YES [ ] NO  EXPLAIN:    A-LINE:  [x ] YES [ ] NO  LOCATION: Left radial  DATE INSERTED: 4/21/2020  REMOVE:  [ ] YES [ ] NO  EXPLAIN:    ICU Vital Signs Last 24 Hrs  T(C): 37.1 (27 Apr 2020 23:00), Max: 37.2 (27 Apr 2020 16:00)  T(F): 98.8 (27 Apr 2020 23:00), Max: 99 (27 Apr 2020 16:00)  HR: 100 (28 Apr 2020 09:15) (87 - 131)  BP: --  BP(mean): --  ABP: 112/61 (28 Apr 2020 08:00) (97/59 - 116/58)  ABP(mean): 77 (28 Apr 2020 08:00) (65 - 82)  RR: 35 (28 Apr 2020 08:00) (35 - 37)  SpO2: 90% (28 Apr 2020 09:15) (90% - 98%)      Mode: AC/ CMV (Assist Control/ Continuous Mandatory Ventilation)  RR (machine): 35  TV (machine): 400  FiO2: 80  PEEP: 10  ITime: 1  MAP: 16  PIP: 29      ABG - ( 28 Apr 2020 03:35 )  pH, Arterial: 7.42  pH, Blood: x     /  pCO2: 50    /  pO2: 71    / HCO3: 32    / Base Excess: 7.0   /  SaO2: 94          I&O's Detail    27 Apr 2020 07:01  -  28 Apr 2020 07:00  --------------------------------------------------------  IN:    Enteral Tube Flush: 750 mL    HYDROmorphone Infusion.: 46 mL    norepinephrine Infusion: 151.2 mL    ns in tub fed  [id number]: 210 mL    propofol Infusion: 506.4 mL    Solution: 100 mL    Solution: 500 mL  Total IN: 2263.6 mL    OUT:    Indwelling Catheter - Urethral: 1380 mL  Total OUT: 1380 mL    Total NET: 883.6 mL      28 Apr 2020 07:01  -  28 Apr 2020 12:54  --------------------------------------------------------  IN:    Enteral Tube Flush: 250 mL    HYDROmorphone Infusion.: 2 mL    norepinephrine Infusion: 6 mL    propofol Infusion: 21 mL    Solution: 400 mL  Total IN: 679 mL    OUT:  Total OUT: 0 mL    Total NET: 679 mL          PHYSICAL EXAM:    GENERAL: Intubated, NAD  HEAD: atraumatic, normocephalic   EYES: PERRLA, white sclera.   ENMT: nasal mucosa- Oral cavity-   NECK: supple, JVD/ no JVD, thyroid-   LYMPH: no palpable lymph nodes     SKIN: warm, dry   CHEST/LUNG: No Chest deformity , no chest tenderness. bilateral breath sounds,no  adventitious sounds  HEART: RRR, no m/r/g   ABDOMEN: soft, nontender, nondistended; bowel sounds.  :diggs catheter.  EXTREMITIES: +1 non-pitting edema, no cyanosis, no clubbing.  NEURO: AA0X , mood/ affect-, no focal neuro deficits    Labs:                                                       10.7   17.30 )-----------( 294      ( 28 Apr 2020 05:45 )             34.3   04-28    146<H>  |  102  |  10  ----------------------------<  86  3.4<L>   |  33<H>  |  0.30<L>    Ca    8.1<L>      28 Apr 2020 05:45  Phos  2.6     04-28  Mg     2.0     04-28    TPro  5.5<L>  /  Alb  2.2<L>  /  TBili  1.2  /  DBili  x   /  AST  73<H>  /  ALT  76<H>  /  AlkPhos  249<H>  04-28      RADIOLOGY & ADDITIONAL STUDIES REVIEWED:   < from: Xray Chest 1 View- PORTABLE-Routine (04.28.20 @ 07:26) >    EXAM:  XR CHEST PORTABLE ROUTINE 1V                            PROCEDURE DATE:  04/28/2020          INTERPRETATION:    Examination: XR CHEST    History: ADMDIAG1: J18.9 PNEUMONIA, UNSPECIFIED ORGANISM/, Intubated    COMPARISON: 4/27/2020    Findings:    Endotracheal tube, gastric tube, and right internal jugular central venous catheter are noted.    Lungs: There are stable bilateral lung opacities.  Heart: The heart is normal in size.  Mediastinum: The mediastinum is within normal limits.    Impression:  1.  Stable bilateral lung opacities.      DISCRETE X-RAY DATA:  Percent of LEFT lung opacification: %  Percent of RIGHT lung opacification: 34-66%  Change in lung opacification from most recent x-ray (<=3 days): Stable  Change from prior dated 3 or more days (same admission): Increase    < end of copied text >

## 2020-04-28 NOTE — PROGRESS NOTE ADULT - ATTENDING COMMENTS
IMP:  This is a 49 yr old man with prior mentioned medical condition admitted with acute hypoxic resp failure due to b/l pna secondary to covid-19 infection. He was intubated 4/20.    S/p IL-6, plaquenil and  steroids     -acute hypoxic resp failure  -b/l pna  -covid-19 infection  -ARDS  -septic shock    Plan:  -continue vent support with lung protective strategy  -sedated / pain control  -continue pressors to maintain MAP>65  -continue anticoag  -ngt feed  -trend biomarkers  -continue antibx  -f/u cultures

## 2020-04-28 NOTE — CHART NOTE - NSCHARTNOTEFT_GEN_A_CORE
Assessment:   Patient is a 49y old  Male who presents with a chief complaint of fever cough (2020 20:50). Pt intubated. Covid +. TF restarted ( order) s/p D/C TF . @0 ml/hr Tf noted. Propofol @21 ml/hr (if x24 hrs=554 kcals fat)      Factors impacting intake: [ ] none [ ] nausea  [ ] vomiting [ ] diarrhea [ ] constipation  [ ]chewing problems [ ] swallowing issues  [x ] other: critically ill, intubated.    Diet Prescription: Diet, NPO with Tube Feed:   Tube Feeding Modality: Nasogastric  Jevity 1.5 William  Total Volume for 24 Hours (mL): 480  Continuous  Starting Tube Feed Rate {mL per Hour}: 10  Increase Tube Feed Rate by (mL): 10     Every hour  Until Goal Tube Feed Rate (mL per Hour): 20  Tube Feed Duration (in Hours): 24  Tube Feed Start Time: 10:00 (20 @ 11:48)    Daily Height in cm: 157.48 (15 Apr 2020 13:15)      Daily Weight in k/15 70.8 kg    % Weight Change; no new weight    Pertinent Medications: MEDICATIONS  (STANDING):  cefepime   IVPB 2000 milliGRAM(s) IV Intermittent every 8 hours  chlorhexidine 0.12% Liquid 15 milliLiter(s) Oral Mucosa every 12 hours  chlorhexidine 2% Cloths 1 Application(s) Topical <User Schedule>  enoxaparin Injectable 70 milliGRAM(s) SubCutaneous every 12 hours  HYDROmorphone Infusion. 2 mG/Hr (2 mL/Hr) IV Continuous <Continuous>  lactulose Syrup 10 Gram(s) Oral every 12 hours  midazolam Infusion 0.02 mG/kG/Hr (1.42 mL/Hr) IV Continuous <Continuous>  norepinephrine Infusion 0.05 MICROgram(s)/kG/Min (3.32 mL/Hr) IV Continuous <Continuous>  pantoprazole  Injectable 40 milliGRAM(s) IV Push daily  propofol Infusion 10 MICROgram(s)/kG/Min (4.25 mL/Hr) IV Continuous <Continuous>    MEDICATIONS  (PRN):  sodium chloride 0.9% lock flush 10 milliLiter(s) IV Push every 1 hour PRN Pre/post blood products, medications, blood draw, and to maintain line patency    Pertinent Labs:  Na146 mmol/L<H> Glu 86 mg/dL K+ 3.4 mmol/L<L> Cr  0.30 mg/dL<L> BUN 10 mg/dL  Phos 2.6 mg/dL  Alb 2.2 g/dL<L>  Chol 128 mg/dL LDL 78 mg/dL HDL 24 mg/dL<L> Trig 130 mg/dL     CAPILLARY BLOOD GLUCOSE      POCT Blood Glucose.: 94 mg/dL (2020 11:04)  POCT Blood Glucose.: 80 mg/dL (2020 05:28)  POCT Blood Glucose.: 106 mg/dL (2020 22:32)  POCT Blood Glucose.: 117 mg/dL (2020 18:13)         Estimated Needs:   [ ] no change since previous assessment  [x ] recalculated:     Nutrition Dx: malnutrition (Moderate PCM) related to acute illness ongoing as evidenced by <50% needs met> 5 days, 2+ edema.      Interventions:   Recommend  [ ] Change Diet To:  [ ] Nutrition Supplement  [x ] Nutrition Support: With Propofol @21 ml/hr: Jevity 1.5 15x24 + 1 Pkt Prosource TID (Jevity 1.5 360 ml, 540 kcals, 23 gm protein). 3 Pkt Prosource add 45 gm protein, 180 kcals. MD to monitor. RD available.   [ ] Other:      [ x ] Tolerance to diet prescription [ x ] weights [ x ] labs[ x ] follow up per protocol  [ ] other:

## 2020-04-29 NOTE — CHART NOTE - NSCHARTNOTEFT_GEN_A_CORE
Got a call from radiologist for CXR for large Rt side pneumothorax. Pt SO2 96% on FiO2 100%, placed pt on supine position, called on call surgery JEROME Valles for chest tube placement.

## 2020-04-29 NOTE — PROGRESS NOTE ADULT - SUBJECTIVE AND OBJECTIVE BOX
INTERVAL /OVERNIGHT EVENTS:     PRESSORS: [ ] YES [ ] NO  WHICH:    ANTIBIOTICS:                  DATE STARTED:  ANTIBIOTICS:                  DATE STARTED:  ANTIBIOTICS:                  DATE STARTED:    Antimicrobial:  cefepime   IVPB 2000 milliGRAM(s) IV Intermittent every 8 hours    Cardiovascular:  phenylephrine    Infusion 0.1 MICROgram(s)/kG/Min IV Continuous <Continuous>    Pulmonary:    Hematalogic:  enoxaparin Injectable 70 milliGRAM(s) SubCutaneous every 12 hours    Other:  chlorhexidine 0.12% Liquid 15 milliLiter(s) Oral Mucosa every 12 hours  chlorhexidine 2% Cloths 1 Application(s) Topical <User Schedule>  HYDROmorphone Infusion. 2 mG/Hr IV Continuous <Continuous>  lactulose Syrup 10 Gram(s) Oral every 12 hours  pantoprazole  Injectable 40 milliGRAM(s) IV Push daily  propofol Infusion 10 MICROgram(s)/kG/Min IV Continuous <Continuous>  rocuronium Infusion 8 MICROgram(s)/kG/Min IV Continuous <Continuous>  sodium chloride 0.9% lock flush 10 milliLiter(s) IV Push every 1 hour PRN    cefepime   IVPB 2000 milliGRAM(s) IV Intermittent every 8 hours  chlorhexidine 0.12% Liquid 15 milliLiter(s) Oral Mucosa every 12 hours  chlorhexidine 2% Cloths 1 Application(s) Topical <User Schedule>  enoxaparin Injectable 70 milliGRAM(s) SubCutaneous every 12 hours  HYDROmorphone Infusion. 2 mG/Hr IV Continuous <Continuous>  lactulose Syrup 10 Gram(s) Oral every 12 hours  pantoprazole  Injectable 40 milliGRAM(s) IV Push daily  phenylephrine    Infusion 0.1 MICROgram(s)/kG/Min IV Continuous <Continuous>  propofol Infusion 10 MICROgram(s)/kG/Min IV Continuous <Continuous>  rocuronium Infusion 8 MICROgram(s)/kG/Min IV Continuous <Continuous>  sodium chloride 0.9% lock flush 10 milliLiter(s) IV Push every 1 hour PRN    Drug Dosing Weight  Height (cm): 157.48 (15 Apr 2020 13:15)  Weight (kg): 70.8 (15 Apr 2020 13:15)  BMI (kg/m2): 28.5 (15 Apr 2020 13:15)  BSA (m2): 1.72 (15 Apr 2020 13:15)    CENTRAL LINE: [ ] YES [ ] NO  LOCATION:   DATE INSERTED:  REMOVE: [ ] YES [ ] NO  EXPLAIN:    DIGGS: [ ] YES [ ] NO    DATE INSERTED:  REMOVE:  [ ] YES [ ] NO  EXPLAIN:    A-LINE:  [ ] YES [ ] NO  LOCATION:   DATE INSERTED:  REMOVE:  [ ] YES [ ] NO  EXPLAIN:    PMH -reviewed admission note, no change since admission  Heart faliure: acute [ ] chronic [ ] acute or chronic [ ] diastolic [ ] systolic [ ] combied systolic and diastolic[ ]  DAVIS: ATN[ ] renal medullary necrosis [ ] CKD I [ ]CKDII [ ]CKD III [ ]CKD IV [ ]CKD V [ ]Other pathological lesions [ ]  Abdominal Nutrition Status: malnutrition [ ] cachexia [ ] morbid obesity/BMI=40 [ ] Supplement ordered [___________]     ICU Vital Signs Last 24 Hrs  T(C): 36.8 (29 Apr 2020 12:00), Max: 38.3 (28 Apr 2020 23:00)  T(F): 98.2 (29 Apr 2020 12:00), Max: 100.9 (28 Apr 2020 23:00)  HR: 93 (29 Apr 2020 15:11) (86 - 133)  BP: --  BP(mean): --  ABP: 100/64 (29 Apr 2020 12:00) (85/58 - 120/69)  ABP(mean): 99 (29 Apr 2020 08:00) (65 - 99)  RR: 36 (29 Apr 2020 12:00) (35 - 36)  SpO2: 95% (29 Apr 2020 15:11) (88% - 99%)      ABG - ( 29 Apr 2020 12:57 )  pH, Arterial: 7.29  pH, Blood: x     /  pCO2: 68    /  pO2: 58    / HCO3: 31    / Base Excess: 3.2   /  SaO2: 85                    04-28 @ 07:01  -  04-29 @ 07:00  --------------------------------------------------------  IN: 3592 mL / OUT: 1675 mL / NET: 1917 mL        Mode: AC/ CMV (Assist Control/ Continuous Mandatory Ventilation)  RR (machine): 35  TV (machine): 400  FiO2: 100  PEEP: 15  ITime: 1  MAP: 19  PIP: 41      PHYSICAL EXAM:    GENERAL:   HEAD: atraumatic, normocephalic   EYES: PERRLA, white sclera.   ENMT: nasal mucosa- Oral cavity-   NECK: supple, JVD/ no JVD, thyroid-   LYMPH: no palpable lymph nodes     SKIN: warm, dry   CHEST/LUNG: ET tube: size        cm at lip. No Chest deformity , no chest tenderness. bilateral breath sounds,no  adventitious sounds  HEART: RRR, no m/r/g   ABDOMEN: soft, nontender, nondistended; bowel sounds.  :diggs catheter.  EXTREMITIES: +1 non-pitting edema, no cyanosis, no clubbing.  NEURO: AA0X , mood/ affect-, no focal neuro deficits        LABS:  CBC Full  -  ( 29 Apr 2020 06:28 )  WBC Count : 20.20 K/uL  RBC Count : 3.79 M/uL  Hemoglobin : 11.1 g/dL  Hematocrit : 34.9 %  Platelet Count - Automated : 321 K/uL  Mean Cell Volume : 92.1 fl  Mean Cell Hemoglobin : 29.3 pg  Mean Cell Hemoglobin Concentration : 31.8 gm/dL  Auto Neutrophil # : 17.76 K/uL  Auto Lymphocyte # : 1.18 K/uL  Auto Monocyte # : 0.54 K/uL  Auto Eosinophil # : 0.30 K/uL  Auto Basophil # : 0.07 K/uL  Auto Neutrophil % : 88.0 %  Auto Lymphocyte % : 5.8 %  Auto Monocyte % : 2.7 %  Auto Eosinophil % : 1.5 %  Auto Basophil % : 0.3 %    04-29    142  |  102  |  9   ----------------------------<  76  3.8   |  32<H>  |  0.38<L>    Ca    8.1<L>      29 Apr 2020 06:28  Phos  3.8     04-29  Mg     2.3     04-29    TPro  5.4<L>  /  Alb  2.1<L>  /  TBili  1.0  /  DBili  x   /  AST  61<H>  /  ALT  75<H>  /  AlkPhos  268<H>  04-29    PT/INR - ( 29 Apr 2020 06:28 )   PT: 11.9 sec;   INR: 1.05 ratio         PTT - ( 29 Apr 2020 06:28 )  PTT:29.6 sec        RADIOLOGY & ADDITIONAL STUDIES REVIEWED:     [ ]GOALS OF CARE DISCUSSION WITH PATIENT/FAMILY/PROXY:    CRITICAL CARE TIME SPENT: 35 minutes INTERVAL /OVERNIGHT EVENTS: Pt intubated, sedated and on pressors.  Pt with large right pneumo - pigtail inserted.      PRESSORS: [x ] YES [ ] NO  WHICH:    ANTIBIOTICS:                  DATE STARTED:  ANTIBIOTICS:                  DATE STARTED:  ANTIBIOTICS:                  DATE STARTED:    Antimicrobial:  cefepime   IVPB 2000 milliGRAM(s) IV Intermittent every 8 hours    Cardiovascular:  phenylephrine    Infusion 0.1 MICROgram(s)/kG/Min IV Continuous <Continuous>    Pulmonary:    Hematalogic:  enoxaparin Injectable 70 milliGRAM(s) SubCutaneous every 12 hours    Other:  chlorhexidine 0.12% Liquid 15 milliLiter(s) Oral Mucosa every 12 hours  chlorhexidine 2% Cloths 1 Application(s) Topical <User Schedule>  HYDROmorphone Infusion. 2 mG/Hr IV Continuous <Continuous>  lactulose Syrup 10 Gram(s) Oral every 12 hours  pantoprazole  Injectable 40 milliGRAM(s) IV Push daily  propofol Infusion 10 MICROgram(s)/kG/Min IV Continuous <Continuous>  rocuronium Infusion 8 MICROgram(s)/kG/Min IV Continuous <Continuous>  sodium chloride 0.9% lock flush 10 milliLiter(s) IV Push every 1 hour PRN    cefepime   IVPB 2000 milliGRAM(s) IV Intermittent every 8 hours  chlorhexidine 0.12% Liquid 15 milliLiter(s) Oral Mucosa every 12 hours  chlorhexidine 2% Cloths 1 Application(s) Topical <User Schedule>  enoxaparin Injectable 70 milliGRAM(s) SubCutaneous every 12 hours  HYDROmorphone Infusion. 2 mG/Hr IV Continuous <Continuous>  lactulose Syrup 10 Gram(s) Oral every 12 hours  pantoprazole  Injectable 40 milliGRAM(s) IV Push daily  phenylephrine    Infusion 0.1 MICROgram(s)/kG/Min IV Continuous <Continuous>  propofol Infusion 10 MICROgram(s)/kG/Min IV Continuous <Continuous>  rocuronium Infusion 8 MICROgram(s)/kG/Min IV Continuous <Continuous>  sodium chloride 0.9% lock flush 10 milliLiter(s) IV Push every 1 hour PRN    Drug Dosing Weight  Height (cm): 157.48 (15 Apr 2020 13:15)  Weight (kg): 70.8 (15 Apr 2020 13:15)  BMI (kg/m2): 28.5 (15 Apr 2020 13:15)  BSA (m2): 1.72 (15 Apr 2020 13:15)    CENTRAL LINE: [x YES [ ] NO  LOCATION:   DATE INSERTED:  REMOVE: [ ] YES [ ] NO  EXPLAIN:    DIGGS: [x ] YES [ ] NO    DATE INSERTED:  REMOVE:  [ ] YES [ ] NO  EXPLAIN:    A-LINE:  [ x] YES [ ] NO  LOCATION:   DATE INSERTED:  REMOVE:  [ ] YES [ ] NO  EXPLAIN:      ICU Vital Signs Last 24 Hrs  T(C): 36.8 (29 Apr 2020 12:00), Max: 38.3 (28 Apr 2020 23:00)  T(F): 98.2 (29 Apr 2020 12:00), Max: 100.9 (28 Apr 2020 23:00)  HR: 93 (29 Apr 2020 15:11) (86 - 133)  BP: --  BP(mean): --  ABP: 100/64 (29 Apr 2020 12:00) (85/58 - 120/69)  ABP(mean): 99 (29 Apr 2020 08:00) (65 - 99)  RR: 36 (29 Apr 2020 12:00) (35 - 36)  SpO2: 95% (29 Apr 2020 15:11) (88% - 99%)      ABG - ( 29 Apr 2020 12:57 )  pH, Arterial: 7.29  pH, Blood: x     /  pCO2: 68    /  pO2: 58    / HCO3: 31    / Base Excess: 3.2   /  SaO2: 85                    04-28 @ 07:01  -  04-29 @ 07:00  --------------------------------------------------------  IN: 3592 mL / OUT: 1675 mL / NET: 1917 mL        Mode: AC/ CMV (Assist Control/ Continuous Mandatory Ventilation)  RR (machine): 35  TV (machine): 400  FiO2: 100  PEEP: 15  ITime: 1  MAP: 19  PIP: 41      PHYSICAL EXAM:    GENERAL:   HEAD: atraumatic, normocephalic   EYES: PERRLA, white sclera.   ENMT: nasal mucosa- Oral cavity-   NECK: supple, JVD/ no JVD, thyroid-   LYMPH: no palpable lymph nodes     SKIN: warm, dry   CHEST/LUNG: ET tube: size        cm at lip. No Chest deformity , no chest tenderness. bilateral breath sounds,no  adventitious sounds  HEART: RRR, no m/r/g   ABDOMEN: soft, nontender, nondistended; bowel sounds.  :diggs catheter.  EXTREMITIES: +1 non-pitting edema, no cyanosis, no clubbing.  NEURO: AA0X , mood/ affect-, no focal neuro deficits        LABS:  CBC Full  -  ( 29 Apr 2020 06:28 )  WBC Count : 20.20 K/uL  RBC Count : 3.79 M/uL  Hemoglobin : 11.1 g/dL  Hematocrit : 34.9 %  Platelet Count - Automated : 321 K/uL  Mean Cell Volume : 92.1 fl  Mean Cell Hemoglobin : 29.3 pg  Mean Cell Hemoglobin Concentration : 31.8 gm/dL  Auto Neutrophil # : 17.76 K/uL  Auto Lymphocyte # : 1.18 K/uL  Auto Monocyte # : 0.54 K/uL  Auto Eosinophil # : 0.30 K/uL  Auto Basophil # : 0.07 K/uL  Auto Neutrophil % : 88.0 %  Auto Lymphocyte % : 5.8 %  Auto Monocyte % : 2.7 %  Auto Eosinophil % : 1.5 %  Auto Basophil % : 0.3 %    04-29    142  |  102  |  9   ----------------------------<  76  3.8   |  32<H>  |  0.38<L>    Ca    8.1<L>      29 Apr 2020 06:28  Phos  3.8     04-29  Mg     2.3     04-29    TPro  5.4<L>  /  Alb  2.1<L>  /  TBili  1.0  /  DBili  x   /  AST  61<H>  /  ALT  75<H>  /  AlkPhos  268<H>  04-29    PT/INR - ( 29 Apr 2020 06:28 )   PT: 11.9 sec;   INR: 1.05 ratio         PTT - ( 29 Apr 2020 06:28 )  PTT:29.6 sec        RADIOLOGY & ADDITIONAL STUDIES REVIEWED:   < from: Xray Chest 1 View- PORTABLE-Routine (04.29.20 @ 08:00) >    EXAM:  XR CHEST PORTABLE ROUTINE 1V                            PROCEDURE DATE:  04/29/2020          INTERPRETATION:    Examination: XR CHEST    History: ADMDIAG1: J18.9 PNEUMONIA, UNSPECIFIED ORGANISM/, intubated    Portable chest x-ray is comparedto a previous examination dated 4/28/2020.    Impression: ET tube terminates at 4.6 cm above the lety. Stable NG tube, and right IJ central venous catheter.    New large right pneumothorax with left shift of the mediastinum, suggestive of a tensionpneumothorax.    Small right pleural effusion.    Patchy opacifications in the left lung, slightly improved.    PA Ms. Horn is informed.  < from: Xray Chest 1 View-PORTABLE IMMEDIATE (04.29.20 @ 10:56) >  EXAM:  XR CHEST PORTABLE IMMED 1V                            PROCEDURE DATE:  04/29/2020          INTERPRETATION:    CLINICAL STATEMENT: Follow-up chest pain.    TECHNIQUE: AP view of the chest.    COMPARISON: 4/29/2020    FINDINGS/  IMPRESSION:  Right chest tube noted with significant improvement of pneumothorax. Residual small right apical pneumothorax noted. Right subcutaneous emphysema.    ET tube, feeding tube, right central line again noted.    Bilateral airspace opacities present improving left upper lung.    No pleural effusion    Heart size within normal limits.    < end of copied text >      DISCRETE X-RAY DATA:  Percent of LEFT lung opacification: %  Percent of RIGHT lung opacification: %  Change in lung opacification from most recent x-ray (<=3 days): Decrease  Change from prior dated 3 or more days (same admission): Increase    < end of copied text >      [ ]GOALS OF CARE DISCUSSION WITH PATIENT/FAMILY/PROXY:    CRITICAL CARE TIME SPENT: 35 minutes

## 2020-04-29 NOTE — PROGRESS NOTE ADULT - ASSESSMENT
Neuro  - sedated on propofol, and Dilaudid      Cardiovascular  - on levo  - keep map >65    Pulmonary  - Acute hypoxic respiratory failure secondary to covid  - ABG - ( 28 Apr 2020 03:35 )  pH, Arterial: 7.42  pH, Blood: x     /  pCO2: 50    /  pO2: 71    / HCO3: 32    / Base Excess: 7.0   /  SaO2: 94        Mode: AC/ CMV (Assist Control/ Continuous Mandatory Ventilation)  RR (machine): 35  TV (machine): 400  FiO2: 80  PEEP: 10  ITime: 1  MAP: 16  PIP: 29          Infections  - COVID-19 viral pneumonia. Completed Plaquenil, Solumedrol and Tocilizumab.    - monitor crp, ferritin, procalcitonin.  CRP trending down  - IV tylenol q6 prn fever      Nephro  - No issues. Monitor urine output.     Gastrointestinal  -  TF    Heme  - Elevated D-dimer > 1000, continue full dose anticoagulation.     Endocrine  - Monitor finger sticks between 120-180.     Prophylaxis   - Full dose anticoagulation for elevated D-dimer. Neuro  - sedated on propofol, and Dilaudid  and julia    Cardiovascular  - on levo  - keep map >65    Pulmonary  - Acute hypoxic respiratory failure secondary to covid  - s/p New large right pneumothorax with left shift of the mediastinum, suggestive of a tension pneumothorax. s/p pigtail   ABG - ( 29 Apr 2020 12:57 )  pH, Arterial: 7.29  pH, Blood: x     /  pCO2: 68    /  pO2: 58    / HCO3: 31    / Base Excess: 3.2   /  SaO2: 85    Mode: AC/ CMV (Assist Control/ Continuous Mandatory Ventilation)  RR (machine): 35  TV (machine): 400  FiO2: 100  PEEP: 14    Infections  - COVID-19 viral pneumonia. Completed Plaquenil, Solumedrol and Tocilizumab.    - monitor crp, ferritin, procalcitonin.  CRP trending down  - IV tylenol q6 prn fever    Nephro  - No issues. Monitor urine output.   - UO decreasing - lasix x1 dose    Gastrointestinal  -  TF    Heme  - Elevated D-dimer > 1000, continue full dose anticoagulation.     Endocrine  - Monitor finger sticks between 120-180.     Prophylaxis   - Full dose anticoagulation for elevated D-dimer.

## 2020-04-29 NOTE — PROGRESS NOTE ADULT - SUBJECTIVE AND OBJECTIVE BOX
Time of Visit:  Patient seen and examined.     MEDICATIONS  (STANDING):  cefepime   IVPB 2000 milliGRAM(s) IV Intermittent every 8 hours  chlorhexidine 0.12% Liquid 15 milliLiter(s) Oral Mucosa every 12 hours  chlorhexidine 2% Cloths 1 Application(s) Topical <User Schedule>  enoxaparin Injectable 70 milliGRAM(s) SubCutaneous every 12 hours  HYDROmorphone Infusion. 2 mG/Hr (2 mL/Hr) IV Continuous <Continuous>  lactulose Syrup 10 Gram(s) Oral every 12 hours  pantoprazole  Injectable 40 milliGRAM(s) IV Push daily  phenylephrine    Infusion 0.1 MICROgram(s)/kG/Min (1.33 mL/Hr) IV Continuous <Continuous>  propofol Infusion 10 MICROgram(s)/kG/Min (4.25 mL/Hr) IV Continuous <Continuous>  rocuronium Infusion 8 MICROgram(s)/kG/Min (6.8 mL/Hr) IV Continuous <Continuous>      MEDICATIONS  (PRN):  sodium chloride 0.9% lock flush 10 milliLiter(s) IV Push every 1 hour PRN Pre/post blood products, medications, blood draw, and to maintain line patency       Medications up to date at time of exam.      PHYSICAL EXAMINATION:  Patient has no new complaints.  GENERAL: The patient is a well-developed, well-nourished, in no apparent distress.     Vital Signs Last 24 Hrs  T(C): 37.1 (29 Apr 2020 16:00), Max: 38.3 (28 Apr 2020 23:00)  T(F): 98.8 (29 Apr 2020 16:00), Max: 100.9 (28 Apr 2020 23:00)  HR: 93 (29 Apr 2020 15:11) (86 - 133)  BP: --  BP(mean): --  RR: 36 (29 Apr 2020 12:00) (35 - 36)  SpO2: 95% (29 Apr 2020 15:11) (88% - 99%)  Mode: AC/ CMV (Assist Control/ Continuous Mandatory Ventilation)  RR (machine): 35  TV (machine): 400  FiO2: 100  PEEP: 15  ITime: 1  MAP: 19  PIP: 41   (if applicable)    Chest Tube (if applicable)    HEENT: Head is normocephalic and atraumatic. Extraocular muscles are intact. Mucous membranes are moist. +ETT    NECK: Supple, no palpable adenopathy.    LUNGS: Clear to auscultation, no wheezing, rales, or rhonchi. Right  chest tube    HEART: Regular rate and rhythm without murmur.    ABDOMEN: Soft, nontender, and nondistended.  No hepatosplenomegaly is noted.    : No painful voiding, no pelvic pain    EXTREMITIES: Without any cyanosis, clubbing, rash, lesions or edema.    NEUROLOGIC: sedated    SKIN: Warm, dry, good turgor.      LABS:                        11.1   20.20 )-----------( 321      ( 29 Apr 2020 06:28 )             34.9     04-29    142  |  102  |  9   ----------------------------<  76  3.8   |  32<H>  |  0.38<L>    Ca    8.1<L>      29 Apr 2020 06:28  Phos  3.8     04-29  Mg     2.3     04-29    TPro  5.4<L>  /  Alb  2.1<L>  /  TBili  1.0  /  DBili  x   /  AST  61<H>  /  ALT  75<H>  /  AlkPhos  268<H>  04-29    PT/INR - ( 29 Apr 2020 06:28 )   PT: 11.9 sec;   INR: 1.05 ratio         PTT - ( 29 Apr 2020 06:28 )  PTT:29.6 sec    ABG - ( 29 Apr 2020 12:57 )  pH, Arterial: 7.29  pH, Blood: x     /  pCO2: 68    /  pO2: 58    / HCO3: 31    / Base Excess: 3.2   /  SaO2: 85                CARDIAC MARKERS ( 29 Apr 2020 17:44 )  0.139 ng/mL / x     / 330 U/L / x     / 2.7 ng/mL      D-Dimer Assay, Quantitative: 1797 ng/mL DDU (04-29-20 @ 06:28)        Procalcitonin, Serum: 0.45 ng/mL (04-29-20 @ 09:02)  Procalcitonin, Serum: 0.35 ng/mL (04-28-20 @ 09:22)      MICROBIOLOGY: (if applicable)    RADIOLOGY & ADDITIONAL STUDIES:  EKG:   CXR:  ECHO:    IMPRESSION: 49y Male PAST MEDICAL & SURGICAL HISTORY:  No pertinent past medical history  No significant past surgical history   p/w         IMP:  This is a 49 yr old man with prior mentioned medical condition admitted with acute hypoxic resp failure due to b/l pna secondary to covid-19 infection. He was intubated 4/20.    S/p IL-6, plaquenil and  steroids .   Pat had new right PTX.  s./p R ight  chest tube     -acute hypoxic resp failure  -b/l pna  -covid-19 infection  -ARDS  -septic shock  -new Right PTX 4/29      Plan:  -continue vent support with lung protective strategy  -chest tube ti suction   -sedated / pain control  -continue pressors to maintain MAP>65  -continue anticoag  -ngt feed  -trend biomarkers  -continue antibx  -f/u cultures

## 2020-04-30 NOTE — CHART NOTE - NSCHARTNOTEFT_GEN_A_CORE
PC  contacted Mr. Gucci Tejeda [phone] and confirmed he is the patient's brother and Deena Tejeda [phone] is the patients' niece.  Mr. Tejeda stated the patient's spouse is Ms. Jelly Gaspar [phone].  The couple has two daughters together ages 9 and 16.  Subsequently, this  contacted patient's wife  [phone] utilizing [employer]  # [id number] for [place]i translation.  Pc Ismael introduced herself and her role and provided emotional support.  Sw confirmed  is the spouse and wants to be contacted for medical discussions and decision making.   stated she would like to know if her  is a candidate for plasma treatment.  PC  stated she will relay this to the medical team which she did and provided  with her contact information.  PC  will remain available as needed.

## 2020-04-30 NOTE — CONSULT NOTE ADULT - SUBJECTIVE AND OBJECTIVE BOX
HPI:  49 years old Male patient from home, who is known COVID positive from 04/10/2019 w/ no significant PMHx or PSHx presents to the ED with fever and headache. Patient reports he has been sick for the past x2 weeks with SOB. Patient adds he is COVID-19 positive and endorses generalized weakness. Patient came to ED 5 days ago with chest pain and shortness of breath and was tested for COVID 19. Patient was saturating fine and was discharged from ED. Patient denies any other complaints.  Patient denies any chest pain, palpitations, abdominal pain, nausea, vomiting abdominal pain , change in urinary or bowel habits.  Patient is admitted with acute respiratory failure secondary to COVID-19 infection requiring 3L NC saturating 96 percent. Will monitor respiratory status. COVID 19 here today is negative. (15 Apr 2020 18:47)      PAST MEDICAL & SURGICAL HISTORY:  No pertinent past medical history  No significant past surgical history      SOCIAL HISTORY:    Admitted from:  home assisted living [facility]   Substance abuse history:              Tobacco hx:                  Alcohol hx:              Home Opioid hx:  Moravian:                                    Preferred Language:    Surrogate/HCP/Guardian:            Phone#:    FAMILY HISTORY:    Baseline ADLs (prior to admission):    Allergies    No Known Allergies    Intolerances      Present Symptoms:   Dyspnea:   Nausea/Vomiting:   Anxiety:  Depressed   Fatigue:  Loss of appetite:   Pain:                                location:          Review of Systems: [All others negative or Unable to obtain due to poor mentation]    MEDICATIONS  (STANDING):  cefepime   IVPB 2000 milliGRAM(s) IV Intermittent every 8 hours  chlorhexidine 0.12% Liquid 15 milliLiter(s) Oral Mucosa every 12 hours  chlorhexidine 2% Cloths 1 Application(s) Topical <User Schedule>  enoxaparin Injectable 70 milliGRAM(s) SubCutaneous every 12 hours  HYDROmorphone Infusion. 4 mG/Hr (4 mL/Hr) IV Continuous <Continuous>  lactulose Syrup 10 Gram(s) Oral every 12 hours  metoprolol tartrate 12.5 milliGRAM(s) Oral two times a day  pantoprazole  Injectable 40 milliGRAM(s) IV Push daily  phenylephrine    Infusion 0.1 MICROgram(s)/kG/Min (1.33 mL/Hr) IV Continuous <Continuous>  propofol Infusion 10 MICROgram(s)/kG/Min (4.25 mL/Hr) IV Continuous <Continuous>  rocuronium Infusion 8 MICROgram(s)/kG/Min (6.8 mL/Hr) IV Continuous <Continuous>    MEDICATIONS  (PRN):  acetaminophen    Suspension .. 650 milliGRAM(s) Oral every 6 hours PRN Temp greater or equal to 38C (100.4F)  sodium chloride 0.9% lock flush 10 milliLiter(s) IV Push every 1 hour PRN Pre/post blood products, medications, blood draw, and to maintain line patency      PHYSICAL EXAM:    Vital Signs Last 24 Hrs  T(C): 38.6 (30 Apr 2020 15:00), Max: 38.6 (30 Apr 2020 15:00)  T(F): 101.5 (30 Apr 2020 15:00), Max: 101.5 (30 Apr 2020 15:00)  HR: 139 (30 Apr 2020 16:00) (113 - 142)  BP: 138/68 (30 Apr 2020 16:00) (96/68 - 138/68)  BP(mean): 83 (30 Apr 2020 16:00) (80 - 83)  RR: 32 (30 Apr 2020 16:00) (30 - 32)  SpO2: 98% (30 Apr 2020 16:00) (97% - 100%)    General: alert  oriented x ____    [lethargic distressed cachexia  nonverbal  unarousable verbal]  Karnofsky Performance Score/Palliative Performance Status Version2:     %    HEENT: normal  dry mouth  ET tube/trach oral lesions:  Lungs: comfortable tachypnea/labored breathing  excessive secretions  CV: normal  tachycardia  GI: normal  distended  tender  incontinent               PEG/NG/OG tube  constipation  last BM:   : normal  incontinent  oliguria/anuria  diggs  Musculoskeletal: normal  weakness  edema             ambulatory  bedbound/wheelchair bound  Skin: normal  pressure ulcers: stage: edema: other:  Neuro: no deficits cognitive impairment dsyphagia/dysarthria paresis: other:  Oral intake ability: unable/only mouth care [minimal moderate full capability]  Diet: [NPO]    LABS:                        11.7   22.74 )-----------( 323      ( 30 Apr 2020 05:17 )             38.7     04-30    140  |  102  |  21<H>  ----------------------------<  122<H>  4.2   |  33<H>  |  0.74    Ca    7.9<L>      30 Apr 2020 05:17  Phos  3.9     04-30  Mg     2.7     04-30    TPro  6.4  /  Alb  2.3<L>  /  TBili  0.9  /  DBili  x   /  AST  165<H>  /  ALT  164<H>  /  AlkPhos  340<H>  04-30        RADIOLOGY & ADDITIONAL STUDIES:    ADVANCE DIRECTIVES:   Advanced Care Planning discussion total time spent: HPI:  49 years old Male patient from home, who is known COVID positive from 04/10/2019 w/ no significant PMHx or PSHx presents to the ED with fever and headache. Patient reports he has been sick for the past x2 weeks with SOB. Patient adds he is COVID-19 positive and endorses generalized weakness. Patient came to ED 5 days ago with chest pain and shortness of breath and was tested for COVID 19. Patient was saturating fine and was discharged from ED. Patient denies any other complaints.  Patient denies any chest pain, palpitations, abdominal pain, nausea, vomiting abdominal pain , change in urinary or bowel habits.  Patient is admitted with acute respiratory failure secondary to COVID-19 infection requiring 3L NC saturating 96 percent. Will monitor respiratory status. COVID 19 here today is negative. (15 Apr 2020 18:47)    Brief Hospital Course:  Patient was admitted with acute hypoxic respiratory failure due to b/l pna 2/2 covid-19 infection. He was intubated 4/20. S/p IL-6, plaquenil and steroids. Pt found to have R pneumothorax, s/p R chest tube. Patient remains in ICU - sedated/intubated, paralytic, 1 pressor. Request to identify surrogate and establish goals of care.       PAST MEDICAL & SURGICAL HISTORY:  No pertinent past medical history  No significant past surgical history      SOCIAL HISTORY:    Admitted from:  home   Substance abuse history/  Tobacco hx/ Alcohol hx/   Home Opioid hx: Unable to obtain - patient sedated/intubated  Caodaism:           n/a                         Preferred Language: Macedonian    Surrogate/HCP/Guardian:   Julianayunieltyesha Gaspar (domestic partner/surrogate): [phone]    FAMILY HISTORY: Unable to obtain - patient sedated/intubated    Baseline ADLs (prior to admission): ambulatory, independent of ADLs    No Known Allergies    Present Symptoms:     Review of Systems: Unable to obtain - patient sedated/intubated    MEDICATIONS  (STANDING):  cefepime   IVPB 2000 milliGRAM(s) IV Intermittent every 8 hours  chlorhexidine 0.12% Liquid 15 milliLiter(s) Oral Mucosa every 12 hours  chlorhexidine 2% Cloths 1 Application(s) Topical <User Schedule>  enoxaparin Injectable 70 milliGRAM(s) SubCutaneous every 12 hours  HYDROmorphone Infusion. 4 mG/Hr (4 mL/Hr) IV Continuous <Continuous>  lactulose Syrup 10 Gram(s) Oral every 12 hours  metoprolol tartrate 12.5 milliGRAM(s) Oral two times a day  pantoprazole  Injectable 40 milliGRAM(s) IV Push daily  phenylephrine    Infusion 0.1 MICROgram(s)/kG/Min (1.33 mL/Hr) IV Continuous <Continuous>  propofol Infusion 10 MICROgram(s)/kG/Min (4.25 mL/Hr) IV Continuous <Continuous>  rocuronium Infusion 8 MICROgram(s)/kG/Min (6.8 mL/Hr) IV Continuous <Continuous>    MEDICATIONS  (PRN):  acetaminophen    Suspension .. 650 milliGRAM(s) Oral every 6 hours PRN Temp greater or equal to 38C (100.4F)  sodium chloride 0.9% lock flush 10 milliLiter(s) IV Push every 1 hour PRN Pre/post blood products, medications, blood draw, and to maintain line patency      PHYSICAL EXAM:    Vital Signs Last 24 Hrs  T(C): 38.6 (30 Apr 2020 15:00), Max: 38.6 (30 Apr 2020 15:00)  T(F): 101.5 (30 Apr 2020 15:00), Max: 101.5 (30 Apr 2020 15:00)  HR: 139 (30 Apr 2020 16:00) (113 - 142)  BP: 138/68 (30 Apr 2020 16:00) (96/68 - 138/68)  BP(mean): 83 (30 Apr 2020 16:00) (80 - 83)  RR: 32 (30 Apr 2020 16:00) (30 - 32)  SpO2: 98% (30 Apr 2020 16:00) (97% - 100%)    General: Patient not medically stable for full physical exam. Patient sedated/intubated, on pressor and paralytic.   Karnofsky Performance Score/Palliative Performance Status Version2:    20 %    HEENT: ET tube  Lungs:  tachypnea, on ventilator, FiO2 80%, on dilaudid, propofol  CV:  tachycardia, on pressor support  GI:   incontinent, NG tube  :   diggs  Musculoskeletal:  Patient sedated/intubated, on paralytic; dependent on ADLs   Skin: unable to assess  Neuro: Patient sedated/intubated, on paralytic; dependent on ADLs   Oral intake ability: unable/only mouth care   Diet: NPO; continue TF via NG tube    LABS:                        11.7   22.74 )-----------( 323      ( 30 Apr 2020 05:17 )             38.7     04-30    140  |  102  |  21<H>  ----------------------------<  122<H>  4.2   |  33<H>  |  0.74    Ca    7.9<L>      30 Apr 2020 05:17  Phos  3.9     04-30  Mg     2.7     04-30    TPro  6.4  /  Alb  2.3<L>  /  TBili  0.9  /  DBili  x   /  AST  165<H>  /  ALT  164<H>  /  AlkPhos  340<H>  04-30        RADIOLOGY & ADDITIONAL STUDIES:  < from: Xray Chest 1 View- PORTABLE-Routine (04.30.20 @ 08:11) >  EXAM:  XR CHEST PORTABLE ROUTINE 1V                        PROCEDURE DATE:  04/30/2020    INTERPRETATION:  CLINICAL INDICATION: 49 years  Male with +covid.  COMPARISON: 4/29/2020  The right lung apex is partially omitted.  The tip of the ET tube is in the mid trachea. The right IJ catheter tip overlies the superior vena cava. The tip of the NG tube is below the diaphragm. A right pigtail thoracostomy tube is reidentified.  There is a small right apical pneumothorax measuring 1.2 cm, increased from 4/29/2020 at 5:27 PM but unchanged from 4/29/2020 at 10:03 AM.  AP view of the chest demonstrates bilateral interstitial and airspace infiltrates mildly improved.. There is no pleural effusion. There is no pneumothorax.  The heart is normal in size. There is no mediastinal or hilar mass.   The pulmonary vasculature is normal.   Mild thoracic degenerative changes are present.    IMPRESSION:  Small right apical pneumothorax is increased from 4/29/2020 at 5:27 PM but unchanged from 4/29/2020 at 10:03 AM.  Tubes and catheters in satisfactory position.  Bilateral infiltrates mildly improved    DISCRETE X-RAY DATA:  Percent of LEFT lung opacification: %  Percent of RIGHT lung opacification: %  Change in lung opacification from most recent x-ray (<=3 days): Decrease  Change from prior dated 3 or more days (same admission): Increase    < end of copied text >      ADVANCE DIRECTIVES:

## 2020-04-30 NOTE — CONSULT NOTE ADULT - PROBLEM SELECTOR RECOMMENDATION 3
2/2 COVID19; comorbid shock and multi-organ failure. CXR indicates Small right apical pneumothorax, bilateral infiltrates mildly improved. Patient remains sedated/intubated, on pressor, paralytic, IV abx. Patient's partner is identified surrogate.

## 2020-04-30 NOTE — PROGRESS NOTE ADULT - SUBJECTIVE AND OBJECTIVE BOX
Patient examined at bedside, no overnight events  right pigtail in palce to suction    T(C): 38.1 (04-30-20 @ 07:00), Max: 38.1 (04-30-20 @ 07:00)  HR: 139 (04-30-20 @ 08:00) (93 - 142)  BP: 96/68 (04-29-20 @ 20:00) (96/68 - 96/68)  RR: 32 (04-30-20 @ 08:00) (30 - 36)  SpO2: 100% (04-30-20 @ 08:00) (95% - 100%)  Wt(kg): --      04-29 @ 07:01  -  04-30 @ 07:00  --------------------------------------------------------  IN: 3038 mL / OUT: 2075 mL / NET: 963 mL      Physical Exam  General: AAOx3, No acute distress  Skin: No jaundice, no icterus  Chest: right pigtail in place to suction, no air leak  Abdomen: soft, nontender, nondistended  Extremities: non edematous, no calf pain bilaterally                          11.7   22.74 )-----------( 323      ( 30 Apr 2020 05:17 )             38.7   04-30    140  |  102  |  21<H>  ----------------------------<  122<H>  4.2   |  33<H>  |  0.74    Ca    7.9<L>      30 Apr 2020 05:17  Phos  3.9     04-30  Mg     2.7     04-30    TPro  6.4  /  Alb  2.3<L>  /  TBili  0.9  /  DBili  x   /  AST  165<H>  /  ALT  164<H>  /  AlkPhos  340<H>  04-30

## 2020-04-30 NOTE — PROGRESS NOTE ADULT - SUBJECTIVE AND OBJECTIVE BOX
INTERVAL /OVERNIGHT EVENTS: Pt with no acute events overnight. Pt remains sedated, intubated and pigtail catheter in place.       PRESSORS: [x ] YES [ ] NO  WHICH: levo    ANTIBIOTICS:                  DATE STARTED:  ANTIBIOTICS:                  DATE STARTED:  ANTIBIOTICS:                  DATE STARTED:    Antimicrobial:  cefepime   IVPB 2000 milliGRAM(s) IV Intermittent every 8 hours    Cardiovascular:  metoprolol tartrate 12.5 milliGRAM(s) Oral two times a day  phenylephrine    Infusion 0.1 MICROgram(s)/kG/Min IV Continuous <Continuous>    Pulmonary:    Hematalogic:  enoxaparin Injectable 70 milliGRAM(s) SubCutaneous every 12 hours    Other:  acetaminophen    Suspension .. 650 milliGRAM(s) Oral every 6 hours PRN  chlorhexidine 0.12% Liquid 15 milliLiter(s) Oral Mucosa every 12 hours  chlorhexidine 2% Cloths 1 Application(s) Topical <User Schedule>  HYDROmorphone Infusion. 4 mG/Hr IV Continuous <Continuous>  lactulose Syrup 10 Gram(s) Oral every 12 hours  pantoprazole  Injectable 40 milliGRAM(s) IV Push daily  propofol Infusion 10 MICROgram(s)/kG/Min IV Continuous <Continuous>  rocuronium Infusion 8 MICROgram(s)/kG/Min IV Continuous <Continuous>  sodium chloride 0.9% lock flush 10 milliLiter(s) IV Push every 1 hour PRN    acetaminophen    Suspension .. 650 milliGRAM(s) Oral every 6 hours PRN  cefepime   IVPB 2000 milliGRAM(s) IV Intermittent every 8 hours  chlorhexidine 0.12% Liquid 15 milliLiter(s) Oral Mucosa every 12 hours  chlorhexidine 2% Cloths 1 Application(s) Topical <User Schedule>  enoxaparin Injectable 70 milliGRAM(s) SubCutaneous every 12 hours  HYDROmorphone Infusion. 4 mG/Hr IV Continuous <Continuous>  lactulose Syrup 10 Gram(s) Oral every 12 hours  metoprolol tartrate 12.5 milliGRAM(s) Oral two times a day  pantoprazole  Injectable 40 milliGRAM(s) IV Push daily  phenylephrine    Infusion 0.1 MICROgram(s)/kG/Min IV Continuous <Continuous>  propofol Infusion 10 MICROgram(s)/kG/Min IV Continuous <Continuous>  rocuronium Infusion 8 MICROgram(s)/kG/Min IV Continuous <Continuous>  sodium chloride 0.9% lock flush 10 milliLiter(s) IV Push every 1 hour PRN    Drug Dosing Weight  Height (cm): 157.48 (15 Apr 2020 13:15)  Weight (kg): 70.8 (15 Apr 2020 13:15)  BMI (kg/m2): 28.5 (15 Apr 2020 13:15)  BSA (m2): 1.72 (15 Apr 2020 13:15)    CENTRAL LINE: [ x] YES [ ] NO  LOCATION:   DATE INSERTED:  REMOVE: [ ] YES [ ] NO  EXPLAIN:    DIGGS: [x ] YES [ ] NO    DATE INSERTED:  REMOVE:  [ ] YES [ ] NO  EXPLAIN:    A-LINE:  [x ] YES [ ] NO  LOCATION:   DATE INSERTED:  REMOVE:  [ ] YES [ ] NO  EXPLAIN:      ICU Vital Signs Last 24 Hrs  T(C): 38 (30 Apr 2020 12:00), Max: 38.1 (30 Apr 2020 07:00)  T(F): 100.4 (30 Apr 2020 12:00), Max: 100.5 (30 Apr 2020 07:00)  HR: 123 (30 Apr 2020 12:00) (113 - 142)  BP: 96/68 (29 Apr 2020 20:00) (96/68 - 96/68)  BP(mean): 80 (29 Apr 2020 20:00) (80 - 80)  ABP: 92/65 (30 Apr 2020 12:00) (14/76 - 123/78)  ABP(mean): 76 (30 Apr 2020 12:00) (76 - 95)  RR: 32 (30 Apr 2020 12:00) (30 - 32)  SpO2: 100% (30 Apr 2020 12:00) (99% - 100%)      ABG - ( 30 Apr 2020 13:58 )  pH, Arterial: 7.34  pH, Blood: x     /  pCO2: 61    /  pO2: 103   / HCO3: 32    / Base Excess: 5.1   /  SaO2: 97                    04-29 @ 07:01  -  04-30 @ 07:00  --------------------------------------------------------  IN: 3038 mL / OUT: 2175 mL / NET: 863 mL        Mode: AC/ CMV (Assist Control/ Continuous Mandatory Ventilation)  RR (machine): 32  TV (machine): 400  FiO2: 80  PEEP: 14  ITime: 1  MAP: 20  PIP: 33      PHYSICAL EXAM:    GENERAL:   HEAD: atraumatic, normocephalic   ENMT: nasal mucosa- Oral cavity- dry  SKIN: warm, dry   CHEST/LUNG: ET tube in place, right sided pigtail  HEART: RRR, no m/r/g   ABDOMEN: soft, nontender, nondistended; bowel sounds.  :diggs catheter.  EXTREMITIES: +1 non-pitting edema, no cyanosis, no clubbing.  NEURO: sedated        LABS:  CBC Full  -  ( 30 Apr 2020 05:17 )  WBC Count : 22.74 K/uL  RBC Count : 4.05 M/uL  Hemoglobin : 11.7 g/dL  Hematocrit : 38.7 %  Platelet Count - Automated : 323 K/uL  Mean Cell Volume : 95.6 fl  Mean Cell Hemoglobin : 28.9 pg  Mean Cell Hemoglobin Concentration : 30.2 gm/dL  Auto Neutrophil # : 21.60 K/uL  Auto Lymphocyte # : 0.23 K/uL  Auto Monocyte # : 0.23 K/uL  Auto Eosinophil # : 0.00 K/uL  Auto Basophil # : 0.00 K/uL  Auto Neutrophil % : 91.0 %  Auto Lymphocyte % : 1.0 %  Auto Monocyte % : 1.0 %  Auto Eosinophil % : 0.0 %  Auto Basophil % : 0.0 %    04-30    140  |  102  |  21<H>  ----------------------------<  122<H>  4.2   |  33<H>  |  0.74    Ca    7.9<L>      30 Apr 2020 05:17  Phos  3.9     04-30  Mg     2.7     04-30    TPro  6.4  /  Alb  2.3<L>  /  TBili  0.9  /  DBili  x   /  AST  165<H>  /  ALT  164<H>  /  AlkPhos  340<H>  04-30    PT/INR - ( 29 Apr 2020 06:28 )   PT: 11.9 sec;   INR: 1.05 ratio         PTT - ( 29 Apr 2020 06:28 )  PTT:29.6 sec        RADIOLOGY & ADDITIONAL STUDIES REVIEWED:   < from: Xray Chest 1 View- PORTABLE-Routine (04.30.20 @ 08:11) >  EXAM:  XR CHEST PORTABLE ROUTINE 1V                            PROCEDURE DATE:  04/30/2020          INTERPRETATION:  CLINICAL INDICATION: 49 years  Male with +covid.    COMPARISON: 4/29/2020    The right lung apex is partially omitted.    The tip of the ET tube is in the mid trachea. The right IJ catheter tip overlies the superior vena cava. The tip of the NG tube is below the diaphragm. A right pigtail thoracostomy tube is reidentified.    There is a small right apical pneumothorax measuring 1.2 cm, increased from 4/29/2020 at 5:27 PM but unchanged from 4/29/2020 at 10:03 AM.    AP view of the chest demonstrates bilateral interstitial and airspace infiltrates mildly improved.. There is no pleural effusion. There is no pneumothorax.    The heart is normal in size. There is no mediastinal or hilar mass.     The pulmonary vasculature is normal.     Mild thoracic degenerative changes are present.    IMPRESSION:    Small right apical pneumothorax is increased from 4/29/2020 at 5:27 PM but unchanged from 4/29/2020 at 10:03 AM.    Tubes and catheters in satisfactory position.    Bilateral infiltrates mildly improved        DISCRETE X-RAY DATA:  Percent of LEFT lung opacification: %  Percent of RIGHT lung opacification: %  Change in lung opacification from most recent x-ray (<=3 days): Decrease  Change from prior dated 3 or more days (same admission): Increase      < end of copied text >      [ ]GOALS OF CARE DISCUSSION WITH PATIENT/FAMILY/PROXY:    CRITICAL CARE TIME SPENT: 35 minutes

## 2020-04-30 NOTE — CONSULT NOTE ADULT - PROBLEM SELECTOR RECOMMENDATION 2
2/2 PNA due to COVID19; involving lungs, heart, liver. WBC: 22.74. CXR indicates Small right apical pneumothorax, bilateral infiltrates mildly improved.  Patient remains sedated/intubated, on pressor, paralytic, IV abx. Patient's partner is identified surrogate.

## 2020-04-30 NOTE — PROGRESS NOTE ADULT - ATTENDING COMMENTS
IMP:  This is a 49 yr old man with prior mentioned medical condition admitted with acute hypoxic resp failure due to b/l pna secondary to covid-19 infection. He was intubated 4/20.    S/p IL-6, plaquenil and  steroids .   Pat had new right PTX.  s./p R ight  chest tube     -acute hypoxic resp failure  -b/l pna  -covid-19 infection  -ARDS  -septic shock  -new Right PTX 4/29      Plan:  -continue vent support with lung protective strategy  -continue antibx  -chest tube to suction   -sedated / pain control  -continue pressors to maintain MAP>65  -continue anticoag  -ngt feed  -trend biomarkers  -continue antibx  -f/u cultures

## 2020-04-30 NOTE — PROGRESS NOTE ADULT - ASSESSMENT
48yo male with right pneumo s/p right pigtail 4/30    1) cont pigtail to suction  2) daily cxr  3) cont icu management

## 2020-04-30 NOTE — CONSULT NOTE ADULT - PROBLEM SELECTOR RECOMMENDATION 9
2/2 PNA/shock due to COVID19; involving lungs (patient intubated); heart (on pressor); shock liver (Alk Phos: 340, AST: 165, ALT: 164). Troponin 0.091. CXR indicates Small right apical pneumothorax, bilateral infiltrates mildly improved. s/p R chest tube 4/29. Patient remains sedated/intubated, on 1 pressor, paralytic, IV abx. Patient's partner is identified surrogate.

## 2020-04-30 NOTE — PROGRESS NOTE ADULT - ASSESSMENT
Neuro  - sedated on propofol, and Dilaudid  and julia    Cardiovascular  - keep map >65  - tachycardia - ecg done - ST, pt started on low dose metoprolol s/p fluid bolus      Pulmonary  - Acute hypoxic respiratory failure secondary to covid  - s/p pigtail insertion for pneumo 4/29, follow up xrays  ABG - ( 30 Apr 2020 13:58 )  pH, Arterial: 7.34  pH, Blood: x     /  pCO2: 61    /  pO2: 103   / HCO3: 32    / Base Excess: 5.1   /  SaO2: 97   Mode: AC/ CMV (Assist Control/ Continuous Mandatory Ventilation)  RR (machine): 32  TV (machine): 400  FiO2: 80  PEEP: 14    Infections  - COVID-19 viral pneumonia. Completed Plaquenil, Solumedrol and Tocilizumab.    - monitor crp, ferritin, procalcitonin.    - Pt on cefepime   - IV tylenol q6 prn fever    Nephro  - No issues. Monitor urine output.   - UO decreasing - s/p lasix yesterday. Today, 500cc bolus given.      Gastrointestinal  -  TF    Heme  - Elevated D-dimer > 1000, continue full dose anticoagulation.     Endocrine  - Monitor finger sticks between 120-180.     Prophylaxis   - Full dose anticoagulation for elevated D-dimer.

## 2020-04-30 NOTE — CONSULT NOTE ADULT - PROBLEM SELECTOR RECOMMENDATION 4
Per report, patient's partner is Ms. Reaves Hubert [phone]. Per report, patient's partner is Ms. Jelly Gaspar [phone], identified as surrogate per patient's brother. Palliative will follow to assist with complex medical decisionmaking.

## 2020-04-30 NOTE — CONSULT NOTE ADULT - CONSULT REASON
50 y/o M +COVID, MOF, s/p chest tube for pneumothorax. Request to identify surrogate and establish goals of care.

## 2020-05-01 NOTE — PROGRESS NOTE ADULT - SUBJECTIVE AND OBJECTIVE BOX
INTERVAL /OVERNIGHT EVENTS: Pt sedated and intubated.  Pt with new pneuo     PRESSORS: [x ] YES [ ] NO  WHICH: Pheny    ANTIBIOTICS:                  DATE STARTED:  ANTIBIOTICS:                  DATE STARTED:  ANTIBIOTICS:                  DATE STARTED:    Antimicrobial:  cefepime   IVPB 2000 milliGRAM(s) IV Intermittent every 8 hours    Cardiovascular:  metoprolol tartrate 12.5 milliGRAM(s) Oral two times a day  phenylephrine    Infusion 0.1 MICROgram(s)/kG/Min IV Continuous <Continuous>    Pulmonary:    Hematalogic:  enoxaparin Injectable 70 milliGRAM(s) SubCutaneous every 12 hours    Other:  acetaminophen    Suspension .. 650 milliGRAM(s) Oral every 6 hours PRN  chlorhexidine 0.12% Liquid 15 milliLiter(s) Oral Mucosa every 12 hours  chlorhexidine 2% Cloths 1 Application(s) Topical <User Schedule>  HYDROmorphone Infusion. 4 mG/Hr IV Continuous <Continuous>  lactulose Syrup 10 Gram(s) Oral every 12 hours  pantoprazole  Injectable 40 milliGRAM(s) IV Push daily  propofol Infusion 10 MICROgram(s)/kG/Min IV Continuous <Continuous>  rocuronium Infusion 8 MICROgram(s)/kG/Min IV Continuous <Continuous>  sodium chloride 0.9% lock flush 10 milliLiter(s) IV Push every 1 hour PRN    acetaminophen    Suspension .. 650 milliGRAM(s) Oral every 6 hours PRN  cefepime   IVPB 2000 milliGRAM(s) IV Intermittent every 8 hours  chlorhexidine 0.12% Liquid 15 milliLiter(s) Oral Mucosa every 12 hours  chlorhexidine 2% Cloths 1 Application(s) Topical <User Schedule>  enoxaparin Injectable 70 milliGRAM(s) SubCutaneous every 12 hours  HYDROmorphone Infusion. 4 mG/Hr IV Continuous <Continuous>  lactulose Syrup 10 Gram(s) Oral every 12 hours  metoprolol tartrate 12.5 milliGRAM(s) Oral two times a day  pantoprazole  Injectable 40 milliGRAM(s) IV Push daily  phenylephrine    Infusion 0.1 MICROgram(s)/kG/Min IV Continuous <Continuous>  propofol Infusion 10 MICROgram(s)/kG/Min IV Continuous <Continuous>  rocuronium Infusion 8 MICROgram(s)/kG/Min IV Continuous <Continuous>  sodium chloride 0.9% lock flush 10 milliLiter(s) IV Push every 1 hour PRN    Drug Dosing Weight  Height (cm): 157.48 (15 Apr 2020 13:15)  Weight (kg): 70.8 (15 Apr 2020 13:15)  BMI (kg/m2): 28.5 (15 Apr 2020 13:15)  BSA (m2): 1.72 (15 Apr 2020 13:15)    CENTRAL LINE: [x] YES [ ] NO  LOCATION:   DATE INSERTED:  REMOVE: [ ] YES [ ] NO  EXPLAIN:    DIGGS: [x ] YES [ ] NO    DATE INSERTED:  REMOVE:  [ ] YES [ ] NO  EXPLAIN:    A-LINE:  [x ] YES [ ] NO  LOCATION:   DATE INSERTED:  REMOVE:  [ ] YES [ ] NO  EXPLAIN:      ICU Vital Signs Last 24 Hrs  T(C): 37.7 (01 May 2020 12:00), Max: 38.6 (30 Apr 2020 15:00)  T(F): 99.8 (01 May 2020 12:00), Max: 101.5 (30 Apr 2020 15:00)  HR: 120 (01 May 2020 12:00) (106 - 139)  BP: 138/68 (30 Apr 2020 16:00) (138/68 - 138/68)  BP(mean): 83 (30 Apr 2020 16:00) (83 - 83)  ABP: 117/60 (01 May 2020 12:00) (76/52 - 140/79)  ABP(mean): 77 (01 May 2020 12:00) (61 - 94)  RR: 35 (01 May 2020 12:00) (32 - 35)  SpO2: 100% (01 May 2020 12:00) (97% - 100%)      ABG - ( 01 May 2020 04:03 )  pH, Arterial: 7.28  pH, Blood: x     /  pCO2: 68    /  pO2: 68    / HCO3: 31    / Base Excess: 3.7   /  SaO2: 92          04-30 @ 07:01  -  05-01 @ 07:00  --------------------------------------------------------  IN: 2031 mL / OUT: 853 mL / NET: 1178 mL        Mode: AC/ CMV (Assist Control/ Continuous Mandatory Ventilation)  RR (machine): 35  TV (machine): 400  FiO2: 70  PEEP: 12  ITime: 1  MAP: 18  PIP: 28      PHYSICAL EXAM:    GENERAL:   HEAD: atraumatic, normocephalic   EYES: PERRLA, white sclera.   ENMT: nasal mucosa- Oral cavity-   NECK: supple, JVD/ no JVD, thyroid-   LYMPH: no palpable lymph nodes     SKIN: warm, dry   CHEST/LUNG: ET tube in place  HEART: RRR, no m/r/g   ABDOMEN: soft, nontender, nondistended; bowel sounds.  :diggs catheter.  EXTREMITIES: +1 non-pitting edema, no cyanosis, no clubbing.  NEURO: sedated        LABS:  CBC Full  -  ( 01 May 2020 06:28 )  WBC Count : 23.48 K/uL  RBC Count : 3.42 M/uL  Hemoglobin : 10.1 g/dL  Hematocrit : 32.9 %  Platelet Count - Automated : 348 K/uL  Mean Cell Volume : 96.2 fl  Mean Cell Hemoglobin : 29.5 pg  Mean Cell Hemoglobin Concentration : 30.7 gm/dL  Auto Neutrophil # : 19.03 K/uL  Auto Lymphocyte # : 1.63 K/uL  Auto Monocyte # : 1.37 K/uL  Auto Eosinophil # : 0.23 K/uL  Auto Basophil # : 0.09 K/uL  Auto Neutrophil % : 81.1 %  Auto Lymphocyte % : 6.9 %  Auto Monocyte % : 5.8 %  Auto Eosinophil % : 1.0 %  Auto Basophil % : 0.4 %    05-01    138  |  99  |  23<H>  ----------------------------<  112<H>  3.9   |  32<H>  |  0.72    Ca    8.2<L>      01 May 2020 06:28  Phos  1.9     05-01  Mg     2.8     05-01    TPro  6.1  /  Alb  2.2<L>  /  TBili  0.8  /  DBili  x   /  AST  95<H>  /  ALT  137<H>  /  AlkPhos  297<H>  05-01            RADIOLOGY & ADDITIONAL STUDIES REVIEWED:     [ ]GOALS OF CARE DISCUSSION WITH PATIENT/FAMILY/PROXY:    CRITICAL CARE TIME SPENT: 35 minutes INTERVAL /OVERNIGHT EVENTS: Pt sedated and intubated.  Pt with new There is now a peripheral pneumothorax along the lung apex, lateral and inferior right lung measuring 3.1 cm at the apex.     PRESSORS: [x ] YES [ ] NO  WHICH: Pheny    ANTIBIOTICS:                  DATE STARTED:  ANTIBIOTICS:                  DATE STARTED:  ANTIBIOTICS:                  DATE STARTED:    Antimicrobial:  cefepime   IVPB 2000 milliGRAM(s) IV Intermittent every 8 hours    Cardiovascular:  metoprolol tartrate 12.5 milliGRAM(s) Oral two times a day  phenylephrine    Infusion 0.1 MICROgram(s)/kG/Min IV Continuous <Continuous>    Pulmonary:    Hematalogic:  enoxaparin Injectable 70 milliGRAM(s) SubCutaneous every 12 hours    Other:  acetaminophen    Suspension .. 650 milliGRAM(s) Oral every 6 hours PRN  chlorhexidine 0.12% Liquid 15 milliLiter(s) Oral Mucosa every 12 hours  chlorhexidine 2% Cloths 1 Application(s) Topical <User Schedule>  HYDROmorphone Infusion. 4 mG/Hr IV Continuous <Continuous>  lactulose Syrup 10 Gram(s) Oral every 12 hours  pantoprazole  Injectable 40 milliGRAM(s) IV Push daily  propofol Infusion 10 MICROgram(s)/kG/Min IV Continuous <Continuous>  rocuronium Infusion 8 MICROgram(s)/kG/Min IV Continuous <Continuous>  sodium chloride 0.9% lock flush 10 milliLiter(s) IV Push every 1 hour PRN    acetaminophen    Suspension .. 650 milliGRAM(s) Oral every 6 hours PRN  cefepime   IVPB 2000 milliGRAM(s) IV Intermittent every 8 hours  chlorhexidine 0.12% Liquid 15 milliLiter(s) Oral Mucosa every 12 hours  chlorhexidine 2% Cloths 1 Application(s) Topical <User Schedule>  enoxaparin Injectable 70 milliGRAM(s) SubCutaneous every 12 hours  HYDROmorphone Infusion. 4 mG/Hr IV Continuous <Continuous>  lactulose Syrup 10 Gram(s) Oral every 12 hours  metoprolol tartrate 12.5 milliGRAM(s) Oral two times a day  pantoprazole  Injectable 40 milliGRAM(s) IV Push daily  phenylephrine    Infusion 0.1 MICROgram(s)/kG/Min IV Continuous <Continuous>  propofol Infusion 10 MICROgram(s)/kG/Min IV Continuous <Continuous>  rocuronium Infusion 8 MICROgram(s)/kG/Min IV Continuous <Continuous>  sodium chloride 0.9% lock flush 10 milliLiter(s) IV Push every 1 hour PRN    Drug Dosing Weight  Height (cm): 157.48 (15 Apr 2020 13:15)  Weight (kg): 70.8 (15 Apr 2020 13:15)  BMI (kg/m2): 28.5 (15 Apr 2020 13:15)  BSA (m2): 1.72 (15 Apr 2020 13:15)    CENTRAL LINE: [x] YES [ ] NO  LOCATION:   DATE INSERTED:  REMOVE: [ ] YES [ ] NO  EXPLAIN:    DIGGS: [x ] YES [ ] NO    DATE INSERTED:  REMOVE:  [ ] YES [ ] NO  EXPLAIN:    A-LINE:  [x ] YES [ ] NO  LOCATION:   DATE INSERTED:  REMOVE:  [ ] YES [ ] NO  EXPLAIN:      ICU Vital Signs Last 24 Hrs  T(C): 37.7 (01 May 2020 12:00), Max: 38.6 (30 Apr 2020 15:00)  T(F): 99.8 (01 May 2020 12:00), Max: 101.5 (30 Apr 2020 15:00)  HR: 120 (01 May 2020 12:00) (106 - 139)  BP: 138/68 (30 Apr 2020 16:00) (138/68 - 138/68)  BP(mean): 83 (30 Apr 2020 16:00) (83 - 83)  ABP: 117/60 (01 May 2020 12:00) (76/52 - 140/79)  ABP(mean): 77 (01 May 2020 12:00) (61 - 94)  RR: 35 (01 May 2020 12:00) (32 - 35)  SpO2: 100% (01 May 2020 12:00) (97% - 100%)      ABG - ( 01 May 2020 04:03 )  pH, Arterial: 7.28  pH, Blood: x     /  pCO2: 68    /  pO2: 68    / HCO3: 31    / Base Excess: 3.7   /  SaO2: 92          04-30 @ 07:01  -  05-01 @ 07:00  --------------------------------------------------------  IN: 2031 mL / OUT: 853 mL / NET: 1178 mL        Mode: AC/ CMV (Assist Control/ Continuous Mandatory Ventilation)  RR (machine): 35  TV (machine): 400  FiO2: 70  PEEP: 12  ITime: 1  MAP: 18  PIP: 28      PHYSICAL EXAM:    GENERAL:   HEAD: atraumatic, normocephalic   ENMT: nasal mucosa- Oral cavity-   SKIN: warm, dry   CHEST/LUNG: ET tube in place  HEART: RRR, no m/r/g   ABDOMEN: soft, nontender, nondistended; bowel sounds.  : diggs catheter.  EXTREMITIES: +1 non-pitting edema, no cyanosis, no clubbing.  NEURO: sedated        LABS:  CBC Full  -  ( 01 May 2020 06:28 )  WBC Count : 23.48 K/uL  RBC Count : 3.42 M/uL  Hemoglobin : 10.1 g/dL  Hematocrit : 32.9 %  Platelet Count - Automated : 348 K/uL  Mean Cell Volume : 96.2 fl  Mean Cell Hemoglobin : 29.5 pg  Mean Cell Hemoglobin Concentration : 30.7 gm/dL  Auto Neutrophil # : 19.03 K/uL  Auto Lymphocyte # : 1.63 K/uL  Auto Monocyte # : 1.37 K/uL  Auto Eosinophil # : 0.23 K/uL  Auto Basophil # : 0.09 K/uL  Auto Neutrophil % : 81.1 %  Auto Lymphocyte % : 6.9 %  Auto Monocyte % : 5.8 %  Auto Eosinophil % : 1.0 %  Auto Basophil % : 0.4 %    05-01    138  |  99  |  23<H>  ----------------------------<  112<H>  3.9   |  32<H>  |  0.72    Ca    8.2<L>      01 May 2020 06:28  Phos  1.9     05-01  Mg     2.8     05-01    TPro  6.1  /  Alb  2.2<L>  /  TBili  0.8  /  DBili  x   /  AST  95<H>  /  ALT  137<H>  /  AlkPhos  297<H>  05-01            RADIOLOGY & ADDITIONAL STUDIES REVIEWED:   < from: Xray Chest 1 View- PORTABLE-Routine (05.01.20 @ 07:51) >  EXAM:  XR CHEST PORTABLE ROUTINE 1V                            PROCEDURE DATE:  05/01/2020          INTERPRETATION:  CLINICAL INDICATION: 49 years  Male with + covid.    COMPARISON: 4/30/2020    The tip of the ET tube is in the mid trachea 5.8 cm above the lety. There is a right IJ catheter with its tip over the superior vena cava. The tip of the NG tube is below the diaphragm. The right pigtail thoracostomy is reidentified.    There is now a peripheral pneumothorax along the lung apex, lateral and inferior right lung measuring 3.1 cm at the apex. There is no midline shift. There is no left pneumothorax    Diffuse bilateral airspace infiltrates are unchanged.    The heart is normal in size. There is no mediastinal or hilar mass.     The pulmonary vasculature is normal.     Mild thoracic degenerative changes are present.    IMPRESSION:    Developing small to moderate pneumothorax. No midline shift.    Tubes and catheters in situ as above.    Bilateral infiltrates unchanged.    Findingsdiscussed with JEROME Lane at 5/1/2020 8:49 AM with readback.    < end of copied text >      [ ]GOALS OF CARE DISCUSSION WITH PATIENT/FAMILY/PROXY:    CRITICAL CARE TIME SPENT: 35 minutes

## 2020-05-01 NOTE — PROGRESS NOTE ADULT - ASSESSMENT
49 yoM COVID19+ vented, R PTX s/p pigtail 4/29    no airleak, on LCS, minimal output  peep 12 from 14  CXR with mod ptx peripheral    - continue CT to sxn  - to be d/w Dr. Merritt

## 2020-05-01 NOTE — PROGRESS NOTE ADULT - ATTENDING COMMENTS
IMP:  This is a 49 yr old man with prior mentioned medical condition admitted with acute hypoxic resp failure due to b/l pna secondary to covid-19 infection. He was intubated 4/20.    S/p IL-6, plaquenil and  steroids .   Pat had new right PTX.  s./p R ight  chest tube     -acute hypoxic resp failure  -b/l pna  -covid-19 infection  -ARDS  -septic shock  -new Right PTX 4/29      Plan:  -continue vent support with lung protective strategy  -continue antibx  -chest tube to suction   -sedated / pain control  -continue pressors to maintain MAP>65  -continue anticoag  -ngt feed  -trend biomarkers  -continue antibx  -f/u cultures .

## 2020-05-01 NOTE — PROGRESS NOTE ADULT - SUBJECTIVE AND OBJECTIVE BOX
INTERVAL HPI/OVERNIGHT EVENTS:    No acute events overnight.   Pt resting comfortably.   Sedated, vented    MEDICATIONS  (STANDING):  cefepime   IVPB 2000 milliGRAM(s) IV Intermittent every 8 hours  chlorhexidine 0.12% Liquid 15 milliLiter(s) Oral Mucosa every 12 hours  chlorhexidine 2% Cloths 1 Application(s) Topical <User Schedule>  enoxaparin Injectable 70 milliGRAM(s) SubCutaneous every 12 hours  HYDROmorphone Infusion. 4 mG/Hr (4 mL/Hr) IV Continuous <Continuous>  lactulose Syrup 10 Gram(s) Oral every 12 hours  metoprolol tartrate 12.5 milliGRAM(s) Oral two times a day  pantoprazole  Injectable 40 milliGRAM(s) IV Push daily  phenylephrine    Infusion 0.1 MICROgram(s)/kG/Min (1.33 mL/Hr) IV Continuous <Continuous>  propofol Infusion 10 MICROgram(s)/kG/Min (4.25 mL/Hr) IV Continuous <Continuous>  rocuronium Infusion 8 MICROgram(s)/kG/Min (6.8 mL/Hr) IV Continuous <Continuous>    MEDICATIONS  (PRN):  acetaminophen    Suspension .. 650 milliGRAM(s) Oral every 6 hours PRN Temp greater or equal to 38C (100.4F)  sodium chloride 0.9% lock flush 10 milliLiter(s) IV Push every 1 hour PRN Pre/post blood products, medications, blood draw, and to maintain line patency      Vital Signs Last 24 Hrs  T(C): 37.9 (01 May 2020 08:00), Max: 38.6 (30 Apr 2020 15:00)  T(F): 100.3 (01 May 2020 08:00), Max: 101.5 (30 Apr 2020 15:00)  HR: 113 (01 May 2020 08:30) (106 - 139)  BP: 138/68 (30 Apr 2020 16:00) (138/68 - 138/68)  BP(mean): 83 (30 Apr 2020 16:00) (83 - 83)  RR: 35 (01 May 2020 08:00) (32 - 35)  SpO2: 100% (01 May 2020 08:30) (97% - 100%)      PHYSICAL EXAM  General: sedated  Resp: vented  Chest: R pigtail in place, minimal output, no airleak, on LCS    I&O's Detail    30 Apr 2020 07:01  -  01 May 2020 07:00  --------------------------------------------------------  IN:    Enteral Tube Flush: 750 mL    HYDROmorphone Infusion.: 68 mL    HYDROmorphone Infusion.: 7 mL    ns in tub fed  [id number]: 200 mL    phenylephrine   Infusion: 48 mL    propofol Infusion: 338 mL    rocuronium Infusion: 120 mL    Sodium Chloride 0.9% IV Bolus: 500 mL  Total IN: 2031 mL    OUT:    Chest Tube: 3 mL    Indwelling Catheter - Urethral: 850 mL  Total OUT: 853 mL    Total NET: 1178 mL          LABS:                        10.1   23.48 )-----------( 348      ( 01 May 2020 06:28 )             32.9             05-01    138  |  99  |  23<H>  ----------------------------<  112<H>  3.9   |  32<H>  |  0.72    Ca    8.2<L>      01 May 2020 06:28  Phos  1.9     05-01  Mg     2.8     05-01    TPro  6.1  /  Alb  2.2<L>  /  TBili  0.8  /  DBili  x   /  AST  95<H>  /  ALT  137<H>  /  AlkPhos  297<H>  05-01      < from: Xray Chest 1 View- PORTABLE-Routine (05.01.20 @ 07:51) >    INTERPRETATION:  CLINICAL INDICATION: 49 years  Male with + covid.    COMPARISON: 4/30/2020    The tip of the ET tube is in the mid trachea 5.8 cm above the lety. There is a right IJ catheter with its tip over the superior vena cava. The tip of the NG tube is below the diaphragm. The right pigtail thoracostomy is reidentified.    There is now a peripheral pneumothorax along the lung apex, lateral and inferior right lung measuring 3.1 cm at the apex. There is no midline shift. There is no left pneumothorax    Diffuse bilateral airspace infiltrates are unchanged.    The heart is normal in size. There is no mediastinal or hilar mass.     The pulmonary vasculature is normal.     Mild thoracic degenerative changes are present.    IMPRESSION:    Developing small to moderate pneumothorax. No midline shift.    Tubes and catheters in situ as above.    Bilateral infiltrates unchanged.    < end of copied text >

## 2020-05-01 NOTE — PROGRESS NOTE ADULT - ASSESSMENT
Neuro  - sedated on propofol, and Dilaudid  and julia    Cardiovascular  - keep map >65  - tachycardia - ecg done - ST, pt started on low dose metoprolol s/p fluid bolus      Pulmonary  - Acute hypoxic respiratory failure secondary to covid  - s/p pigtail insertion for pneumo 4/29, follow up xrays  ABG - ( 30 Apr 2020 13:58 )  pH, Arterial: 7.34  pH, Blood: x     /  pCO2: 61    /  pO2: 103   / HCO3: 32    / Base Excess: 5.1   /  SaO2: 97   Mode: AC/ CMV (Assist Control/ Continuous Mandatory Ventilation)  RR (machine): 32  TV (machine): 400  FiO2: 80  PEEP: 14    Infections  - COVID-19 viral pneumonia. Completed Plaquenil, Solumedrol and Tocilizumab.    - monitor crp, ferritin, procalcitonin.    - Pt on cefepime   - IV tylenol q6 prn fever    Nephro  - No issues. Monitor urine output.   - UO decreasing - s/p lasix yesterday. Today, 500cc bolus given.      Gastrointestinal  -  TF    Heme  - Elevated D-dimer > 1000, continue full dose anticoagulation.     Endocrine  - Monitor finger sticks between 120-180.     Prophylaxis   - Full dose anticoagulation for elevated D-dimer. Neuro  - sedated on propofol, and Dilaudid  and julia    Cardiovascular  - keep map >65  - sinus tachycardia, off levo, will monitor temp      Pulmonary  - Acute hypoxic respiratory failure secondary to covid  - s/p pigtail insertion for pneumo 4/29, +peripheral pneumothorax along the lung apex, lateral and inferior right lung measuring 3.1 cm at the apex.   ABG - ( 01 May 2020 04:03 )  pH, Arterial: 7.28  pH, Blood: x     /  pCO2: 68    /  pO2: 68    / HCO3: 31    / Base Excess: 3.7   /  SaO2: 92   RR (machine): 35  TV (machine): 400  FiO2: 70  PEEP: 12      Infections  - COVID-19 viral pneumonia. Completed Plaquenil, Solumedrol and Tocilizumab.    - monitor crp, ferritin, procalcitonin.    - Pt on cefepime   - IV tylenol q6 prn fever    Nephro  - No issues. Monitor urine output.   - UO decreasing - s/p lasix yesterday. Today, 500cc bolus given.      Gastrointestinal  -  TF    Heme  - Elevated D-dimer > 1000, continue full dose anticoagulation.     Endocrine  - Monitor finger sticks between 120-180.     Prophylaxis   - Full dose anticoagulation for elevated D-dimer. Neuro  - sedated on propofol, and Dilaudid and julia    Cardiovascular  - keep map >65  - sinus tachycardia, off levo, will monitor temp      Pulmonary  - Acute hypoxic respiratory failure secondary to covid  - s/p pigtail insertion for pneumo 4/29, +peripheral pneumothorax along the lung apex, lateral and inferior right lung measuring 3.1 cm at the apex. Dr. Hilliard aware - Suction increased to 40.  ABG - ( 01 May 2020 04:03 )  pH, Arterial: 7.28  pH, Blood: x     /  pCO2: 68    /  pO2: 68    / HCO3: 31    / Base Excess: 3.7   /  SaO2: 92   RR (machine): 35  TV (machine): 400  FiO2: 70  PEEP: 12      Infections  - COVID-19 viral pneumonia. Completed Plaquenil, Solumedrol and Tocilizumab.    - monitor crp, ferritin, procalcitonin.    - Pt on cefepime   - IV tylenol q6 prn fever    Nephro  - No issues. Monitor urine output.   - s/p NS bolus yesterday    Gastrointestinal  -  TF    Heme  - Elevated D-dimer > 1000, continue full dose anticoagulation.     Endocrine  - Monitor finger sticks between 120-180.     Prophylaxis   - Full dose anticoagulation for elevated D-dimer.

## 2020-05-01 NOTE — CHART NOTE - NSCHARTNOTEFT_GEN_A_CORE
Brief Palliative note    Spoke with patient's wife Jelly Gaspar  regarding current condition, guarded prognosis, on pressor support, CT in place. Discussed high risk of mortality during this hospitalization given COVID infection, all medical measures being taken to support him through it, however, if he continues to decline, aggressive measures such as CPR would be futile. She is agreeable for DNR but wishes to continue all other measures at this time. Support provided. MATTEO drafted.

## 2020-05-02 NOTE — PROGRESS NOTE ADULT - SUBJECTIVE AND OBJECTIVE BOX
Time of Visit:  Patient seen and examined.     MEDICATIONS  (STANDING):  cefepime   IVPB 2000 milliGRAM(s) IV Intermittent every 8 hours  chlorhexidine 0.12% Liquid 15 milliLiter(s) Oral Mucosa every 12 hours  chlorhexidine 2% Cloths 1 Application(s) Topical <User Schedule>  enoxaparin Injectable 70 milliGRAM(s) SubCutaneous every 12 hours  HYDROmorphone Infusion. 4 mG/Hr (4 mL/Hr) IV Continuous <Continuous>  lactulose Syrup 10 Gram(s) Oral every 12 hours  mupirocin 2% Nasal 1 Application(s) Nasal two times a day  pantoprazole  Injectable 40 milliGRAM(s) IV Push daily  phenylephrine    Infusion 0.1 MICROgram(s)/kG/Min (1.33 mL/Hr) IV Continuous <Continuous>  propofol Infusion 10 MICROgram(s)/kG/Min (4.25 mL/Hr) IV Continuous <Continuous>      MEDICATIONS  (PRN):  acetaminophen    Suspension .. 650 milliGRAM(s) Oral every 6 hours PRN Temp greater or equal to 38C (100.4F)  midazolam Injectable 2 milliGRAM(s) IV Push every 4 hours PRN agitation  sodium chloride 0.9% lock flush 10 milliLiter(s) IV Push every 1 hour PRN Pre/post blood products, medications, blood draw, and to maintain line patency       Medications up to date at time of exam.      PHYSICAL EXAMINATION:  Patient has no new complaints.  GENERAL: The patient is a well-developed, well-nourished, in no apparent distress.     Vital Signs Last 24 Hrs  T(C): 37.8 (02 May 2020 16:00), Max: 38.8 (02 May 2020 15:00)  T(F): 100 (02 May 2020 16:00), Max: 101.9 (02 May 2020 15:00)  HR: 128 (02 May 2020 16:00) (99 - 128)  BP: --  BP(mean): --  RR: 27 (02 May 2020 16:00) (27 - 35)  SpO2: 95% (02 May 2020 16:00) (91% - 100%)  Mode: AC/ CMV (Assist Control/ Continuous Mandatory Ventilation)  RR (machine): 35  TV (machine): 400  FiO2: 70  PEEP: 10  ITime: 1  MAP: 16  PIP: 28   (if applicable)    Chest Tube (if applicable)    HEENT: Head is normocephalic and atraumatic. Extraocular muscles are intact. Mucous membranes are moist.  +ETT    NECK: Supple, no palpable adenopathy.    LUNGS: Clear to auscultation, no wheezing, rales, or rhonchi.    HEART: Regular rate and rhythm without murmur.    ABDOMEN: Soft, nontender, and nondistended.  No hepatosplenomegaly is noted.    : No painful voiding, no pelvic pain    EXTREMITIES: Without any cyanosis, clubbing, rash, lesions or edema.    NEUROLOGIC: arousal to voice    SKIN: Warm, dry, good turgor.      LABS:                        8.2    21.15 )-----------( 343      ( 02 May 2020 06:19 )             25.9     05-02    135  |  96  |  15  ----------------------------<  90  3.4<L>   |  36<H>  |  0.52    Ca    7.7<L>      02 May 2020 06:19  Phos  1.6     05-02  Mg     2.5     05-02    TPro  5.6<L>  /  Alb  2.0<L>  /  TBili  0.8  /  DBili  x   /  AST  63<H>  /  ALT  95<H>  /  AlkPhos  258<H>  05-02        ABG - ( 02 May 2020 18:08 )  pH, Arterial: 7.36  pH, Blood: x     /  pCO2: 63    /  pO2: 53    / HCO3: 35    / Base Excess: 8.6   /  SaO2: 86                          Procalcitonin, Serum: 0.51 ng/mL (05-02-20 @ 11:02)  Procalcitonin, Serum: 0.62 ng/mL (05-01-20 @ 10:16)  Procalcitonin, Serum: 1.21 ng/mL (04-30-20 @ 09:53)      MICROBIOLOGY: (if applicable)    RADIOLOGY & ADDITIONAL STUDIES:  EKG:   CXR:< from: Xray Chest 1 View- PORTABLE-Routine (05.02.20 @ 10:35) >    EXAM:  XR CHEST PORTABLE ROUTINE 1V                            PROCEDURE DATE:  05/02/2020          INTERPRETATION:  CLINICAL INDICATION: 49 years  Male with + covid.    COMPARISON: 5/1/2020    The right costophrenic angles partially omitted.    The tip of the ET tube is in the mid trachea 5 cm above the lety. The NG tube extends below the diaphragm. Its tip is not included on the image. The right IJ catheter tip overlies the superior vena cava. A right pigtail thoracostomy tube is unchanged.    AP view of the chest demonstrates diffuse bilateral airspace infiltrates unchanged.. There is no pleural effusion. There is trace right apical pneumothorax measuring 6 mm, unchanged    The heart is normal in size. There is no mediastinal or hilar mass.     The pulmonary vasculature is normal.     Mild thoracic degenerative changes are present.    IMPRESSION:    Trace right apical pneumothorax unchanged.    Bilateral infiltrates unchanged.    Tubes and catheters in satisfactory position.        DISCRETE X-RAY DATA:  Percent of LEFT lung opacification: %  Percent of RIGHT lung opacification: %  Change in lung opacification from most recent x-ray (<=3 days): Stable  Change from prior dated 3 or more days (same admission): Stable                ZAIN MONET M.D., ATTENDING RADIOLOGIST  This document has been electronically signed. May  2 2020 10:39AM                < end of copied text >    ECHO:    IMPRESSION: 49y Male PAST MEDICAL & SURGICAL HISTORY:  No pertinent past medical history  No significant past surgical history   p/w           IMP:  This is a 49 yr old man with prior mentioned medical condition admitted with acute hypoxic resp failure due to b/l pna secondary to covid-19 infection. He was intubated 4/20.    S/p IL-6, plaquenil and  steroids .   Pat had new right PTX.  s./p R ight  chest tube     -acute hypoxic resp failure  -b/l pna  -covid-19 infection  -ARDS  -septic shock  -Right PTX 4/29      Plan:  -continue vent support with lung protective strategy  -continue antibx  -chest tube to suction   -decrease sedated / pain control  -SBT   -continue pressors to maintain MAP>65  -continue anticoag  -ngt feed  -trend biomarkers  -continue antibx  -f/u cultures .       case discussed with icu team

## 2020-05-02 NOTE — PROGRESS NOTE ADULT - ASSESSMENT
Neuro  - sedated on propofol, and Dilaudid   - dc julia  - wean down sedation and give versed pushes as needed    Cardiovascular  - keep map >65  - off pheny      Pulmonary  - Acute hypoxic respiratory failure secondary to covid  - s/p pigtail insertion for pneumo 4/29, +peripheral pneumothorax along the lung apex, lateral and inferior right lung measuring 3.1 cm at the apex. Dr. Hilliard aware - Suction increased to 40  ABG - ( 02 May 2020 03:35 )  pH, Arterial: 7.39  pH, Blood: x     /  pCO2: 63    /  pO2: 90    / HCO3: 37    / Base Excess: 10.5  /  SaO2: 97   Vent: 30/400/50/10    Infections  - COVID-19 viral pneumonia. Completed Plaquenil, Solumedrol and Tocilizumab.    - monitor crp, ferritin, procalcitonin.    - Pt on cefepime   - IV tylenol q6 prn fever    Nephro  - No issues. Monitor urine output.   - s/p NS bolus yesterday    Gastrointestinal  -  TF    Heme  - Elevated D-dimer > 1000, continue full dose anticoagulation.     Endocrine  - Monitor finger sticks between 120-180.     Prophylaxis   - Full dose anticoagulation for elevated D-dimer. Neuro  - sedated on propofol, and Dilaudid   - dc julia  - wean down sedation and give versed pushes as needed  - possible sbt in am if pt awake and vent settings minimal    Cardiovascular  - keep map >65  - off pheny      Pulmonary  - Acute hypoxic respiratory failure secondary to covid  - s/p pigtail insertion for pneumo 4/29, +peripheral pneumothorax along the lung apex, lateral and inferior right lung measuring 3.1 cm at the apex. Dr. Hilliard aware - Suction increased to 40, monitor xrays  ABG - ( 02 May 2020 03:35 )  pH, Arterial: 7.39  pH, Blood: x     /  pCO2: 63    /  pO2: 90    / HCO3: 37    / Base Excess: 10.5  /  SaO2: 97   Vent: 30/400/50/10    Infections  - COVID-19 viral pneumonia. Completed Plaquenil, Solumedrol and Tocilizumab.    - monitor crp, ferritin, procalcitonin.    - Pt on cefepime   - IV tylenol q6 prn fever    Nephro  - No issues. Monitor urine output.   - s/p NS bolus yesterday    Gastrointestinal  -  TF    Heme  - Elevated D-dimer > 1000, continue full dose anticoagulation.     Endocrine  - Monitor finger sticks between 120-180.     Prophylaxis   - Full dose anticoagulation for elevated D-dimer. Neuro  - sedated on propofol, and Dilaudid   - dc julia  - wean down sedation and give versed pushes as needed  - possible sbt in am if pt awake and vent settings minimal    Cardiovascular  - keep map >65  - off pheny      Pulmonary  - Acute hypoxic respiratory failure secondary to covid  - s/p pigtail insertion for pneumo 4/29, +peripheral pneumothorax along the lung apex, lateral and inferior right lung measuring 3.1 cm at the apex. Dr. Hilliard aware - Suction increased to 40, monitor xrays  ABG - ( 02 May 2020 03:35 )  pH, Arterial: 7.39  pH, Blood: x     /  pCO2: 63    /  pO2: 90    / HCO3: 37    / Base Excess: 10.5  /  SaO2: 97   Vent: 30/400/50/10    Infections  - COVID-19 viral pneumonia. Completed Plaquenil, Solumedrol and Tocilizumab.    - monitor crp, ferritin, procalcitonin.    - Pt on cefepime   - IV tylenol q6 prn fever    Nephro  - No issues. Monitor urine output.       Gastrointestinal  -  TF    Heme  - Elevated D-dimer > 1000, continue full dose anticoagulation.     Endocrine  - Monitor finger sticks between 120-180.     Prophylaxis   - Full dose anticoagulation for elevated D-dimer.

## 2020-05-02 NOTE — PROGRESS NOTE ADULT - SUBJECTIVE AND OBJECTIVE BOX
No acute events  reported overnight.   Pt resting comfortably.   Sedated, vented    Pigtail on pleurovac xuction, no air leak noted  output 170cc / 24 hrs    no crepitus  good air entry noted bilat                          8.2    21.15 )-----------( 343      ( 02 May 2020 06:19 )             25.9     05-02    135  |  96  |  15  ----------------------------<  90  3.4<L>   |  36<H>  |  0.52    Ca    7.7<L>      02 May 2020 06:19  Phos  1.6     05-02  Mg     2.5     05-02    TPro  5.6<L>  /  Alb  2.0<L>  /  TBili  0.8  /  DBili  x   /  AST  63<H>  /  ALT  95<H>  /  AlkPhos  258<H>  05-02

## 2020-05-02 NOTE — PROGRESS NOTE ADULT - ASSESSMENT
Right pigtail pleural cath on suction due to hx ptx on ventolated patient covid +  DNR status  continue pigtail to suction  cxr  observation

## 2020-05-02 NOTE — PROGRESS NOTE ADULT - SUBJECTIVE AND OBJECTIVE BOX
INTERVAL /OVERNIGHT EVENTS:     PRESSORS: [ ] YES [ ] NO  WHICH:    ANTIBIOTICS:                  DATE STARTED:  ANTIBIOTICS:                  DATE STARTED:  ANTIBIOTICS:                  DATE STARTED:    Antimicrobial:  cefepime   IVPB 2000 milliGRAM(s) IV Intermittent every 8 hours    Cardiovascular:  phenylephrine    Infusion 0.1 MICROgram(s)/kG/Min IV Continuous <Continuous>    Pulmonary:    Hematalogic:  enoxaparin Injectable 70 milliGRAM(s) SubCutaneous every 12 hours    Other:  acetaminophen    Suspension .. 650 milliGRAM(s) Oral every 6 hours PRN  chlorhexidine 0.12% Liquid 15 milliLiter(s) Oral Mucosa every 12 hours  chlorhexidine 2% Cloths 1 Application(s) Topical <User Schedule>  HYDROmorphone Infusion. 4 mG/Hr IV Continuous <Continuous>  lactulose Syrup 10 Gram(s) Oral every 12 hours  pantoprazole  Injectable 40 milliGRAM(s) IV Push daily  propofol Infusion 10 MICROgram(s)/kG/Min IV Continuous <Continuous>  sodium chloride 0.9% lock flush 10 milliLiter(s) IV Push every 1 hour PRN    acetaminophen    Suspension .. 650 milliGRAM(s) Oral every 6 hours PRN  cefepime   IVPB 2000 milliGRAM(s) IV Intermittent every 8 hours  chlorhexidine 0.12% Liquid 15 milliLiter(s) Oral Mucosa every 12 hours  chlorhexidine 2% Cloths 1 Application(s) Topical <User Schedule>  enoxaparin Injectable 70 milliGRAM(s) SubCutaneous every 12 hours  HYDROmorphone Infusion. 4 mG/Hr IV Continuous <Continuous>  lactulose Syrup 10 Gram(s) Oral every 12 hours  pantoprazole  Injectable 40 milliGRAM(s) IV Push daily  phenylephrine    Infusion 0.1 MICROgram(s)/kG/Min IV Continuous <Continuous>  propofol Infusion 10 MICROgram(s)/kG/Min IV Continuous <Continuous>  sodium chloride 0.9% lock flush 10 milliLiter(s) IV Push every 1 hour PRN    Drug Dosing Weight  Height (cm): 157.48 (15 Apr 2020 13:15)  Weight (kg): 70.8 (15 Apr 2020 13:15)  BMI (kg/m2): 28.5 (15 Apr 2020 13:15)  BSA (m2): 1.72 (15 Apr 2020 13:15)    CENTRAL LINE: [ ] YES [ ] NO  LOCATION:   DATE INSERTED:  REMOVE: [ ] YES [ ] NO  EXPLAIN:    DIGGS: [ ] YES [ ] NO    DATE INSERTED:  REMOVE:  [ ] YES [ ] NO  EXPLAIN:    A-LINE:  [ ] YES [ ] NO  LOCATION:   DATE INSERTED:  REMOVE:  [ ] YES [ ] NO  EXPLAIN:    PMH -reviewed admission note, no change since admission  Heart faliure: acute [ ] chronic [ ] acute or chronic [ ] diastolic [ ] systolic [ ] combied systolic and diastolic[ ]  DAVIS: ATN[ ] renal medullary necrosis [ ] CKD I [ ]CKDII [ ]CKD III [ ]CKD IV [ ]CKD V [ ]Other pathological lesions [ ]  Abdominal Nutrition Status: malnutrition [ ] cachexia [ ] morbid obesity/BMI=40 [ ] Supplement ordered [___________]     ICU Vital Signs Last 24 Hrs  T(C): 37.7 (02 May 2020 12:00), Max: 38.1 (02 May 2020 04:00)  T(F): 99.9 (02 May 2020 12:00), Max: 100.5 (02 May 2020 04:00)  HR: 122 (02 May 2020 12:00) (99 - 128)  BP: --  BP(mean): --  ABP: 122/59 (02 May 2020 12:00) (91/50 - 128/66)  ABP(mean): 76 (02 May 2020 08:00) (63 - 86)  RR: 34 (02 May 2020 12:00) (34 - 35)  SpO2: 91% (02 May 2020 12:00) (91% - 100%)      ABG - ( 02 May 2020 03:35 )  pH, Arterial: 7.39  pH, Blood: x     /  pCO2: 63    /  pO2: 90    / HCO3: 37    / Base Excess: 10.5  /  SaO2: 97                    05-01 @ 07:01  -  05-02 @ 07:00  --------------------------------------------------------  IN: 1301.6 mL / OUT: 1020 mL / NET: 281.6 mL        Mode: AC/ CMV (Assist Control/ Continuous Mandatory Ventilation)  RR (machine): 35  TV (machine): 400  FiO2: 70  PEEP: 10  ITime: 1  MAP: 16  PIP: 28      PHYSICAL EXAM:    GENERAL:   HEAD: atraumatic, normocephalic   EYES: PERRLA, white sclera.   ENMT: nasal mucosa- Oral cavity-   NECK: supple, JVD/ no JVD, thyroid-   LYMPH: no palpable lymph nodes     SKIN: warm, dry   CHEST/LUNG: ET tube: size        cm at lip. No Chest deformity , no chest tenderness. bilateral breath sounds,no  adventitious sounds  HEART: RRR, no m/r/g   ABDOMEN: soft, nontender, nondistended; bowel sounds.  :diggs catheter.  EXTREMITIES: +1 non-pitting edema, no cyanosis, no clubbing.  NEURO: AA0X , mood/ affect-, no focal neuro deficits        LABS:  CBC Full  -  ( 02 May 2020 06:19 )  WBC Count : 21.15 K/uL  RBC Count : 2.72 M/uL  Hemoglobin : 8.2 g/dL  Hematocrit : 25.9 %  Platelet Count - Automated : 343 K/uL  Mean Cell Volume : 95.2 fl  Mean Cell Hemoglobin : 30.1 pg  Mean Cell Hemoglobin Concentration : 31.7 gm/dL  Auto Neutrophil # : 17.02 K/uL  Auto Lymphocyte # : 1.12 K/uL  Auto Monocyte # : 1.41 K/uL  Auto Eosinophil # : 0.24 K/uL  Auto Basophil # : 0.06 K/uL  Auto Neutrophil % : 80.5 %  Auto Lymphocyte % : 5.3 %  Auto Monocyte % : 6.7 %  Auto Eosinophil % : 1.1 %  Auto Basophil % : 0.3 %    05-02    135  |  96  |  15  ----------------------------<  90  3.4<L>   |  36<H>  |  0.52    Ca    7.7<L>      02 May 2020 06:19  Phos  1.6     05-02  Mg     2.5     05-02    TPro  5.6<L>  /  Alb  2.0<L>  /  TBili  0.8  /  DBili  x   /  AST  63<H>  /  ALT  95<H>  /  AlkPhos  258<H>  05-02            RADIOLOGY & ADDITIONAL STUDIES REVIEWED:     [ ]GOALS OF CARE DISCUSSION WITH PATIENT/FAMILY/PROXY:    CRITICAL CARE TIME SPENT: 35 minutes INTERVAL /OVERNIGHT EVENTS: 49 year old male with no acute events overnight.      PRESSORS: [ ] YES [x ] NO  WHICH:     ANTIBIOTICS:                  DATE STARTED:  ANTIBIOTICS:                  DATE STARTED:  ANTIBIOTICS:                  DATE STARTED:    Antimicrobial:  cefepime   IVPB 2000 milliGRAM(s) IV Intermittent every 8 hours    Cardiovascular:  phenylephrine    Infusion 0.1 MICROgram(s)/kG/Min IV Continuous <Continuous>    Pulmonary:    Hematalogic:  enoxaparin Injectable 70 milliGRAM(s) SubCutaneous every 12 hours    Other:  acetaminophen    Suspension .. 650 milliGRAM(s) Oral every 6 hours PRN  chlorhexidine 0.12% Liquid 15 milliLiter(s) Oral Mucosa every 12 hours  chlorhexidine 2% Cloths 1 Application(s) Topical <User Schedule>  HYDROmorphone Infusion. 4 mG/Hr IV Continuous <Continuous>  lactulose Syrup 10 Gram(s) Oral every 12 hours  pantoprazole  Injectable 40 milliGRAM(s) IV Push daily  propofol Infusion 10 MICROgram(s)/kG/Min IV Continuous <Continuous>  sodium chloride 0.9% lock flush 10 milliLiter(s) IV Push every 1 hour PRN    acetaminophen    Suspension .. 650 milliGRAM(s) Oral every 6 hours PRN  cefepime   IVPB 2000 milliGRAM(s) IV Intermittent every 8 hours  chlorhexidine 0.12% Liquid 15 milliLiter(s) Oral Mucosa every 12 hours  chlorhexidine 2% Cloths 1 Application(s) Topical <User Schedule>  enoxaparin Injectable 70 milliGRAM(s) SubCutaneous every 12 hours  HYDROmorphone Infusion. 4 mG/Hr IV Continuous <Continuous>  lactulose Syrup 10 Gram(s) Oral every 12 hours  pantoprazole  Injectable 40 milliGRAM(s) IV Push daily  phenylephrine    Infusion 0.1 MICROgram(s)/kG/Min IV Continuous <Continuous>  propofol Infusion 10 MICROgram(s)/kG/Min IV Continuous <Continuous>  sodium chloride 0.9% lock flush 10 milliLiter(s) IV Push every 1 hour PRN    Drug Dosing Weight  Height (cm): 157.48 (15 Apr 2020 13:15)  Weight (kg): 70.8 (15 Apr 2020 13:15)  BMI (kg/m2): 28.5 (15 Apr 2020 13:15)  BSA (m2): 1.72 (15 Apr 2020 13:15)    CENTRAL LINE: [x ] YES [ ] NO  LOCATION:   DATE INSERTED:  REMOVE: [ ] YES [ ] NO  EXPLAIN:    DIGGS: [x ] YES [ ] NO    DATE INSERTED:  REMOVE:  [ ] YES [ ] NO  EXPLAIN:    A-LINE:  [x ] YES [ ] NO  LOCATION:   DATE INSERTED:  REMOVE:  [ ] YES [ ] NO  EXPLAIN:      ICU Vital Signs Last 24 Hrs  T(C): 37.7 (02 May 2020 12:00), Max: 38.1 (02 May 2020 04:00)  T(F): 99.9 (02 May 2020 12:00), Max: 100.5 (02 May 2020 04:00)  HR: 122 (02 May 2020 12:00) (99 - 128)  BP: --  BP(mean): --  ABP: 122/59 (02 May 2020 12:00) (91/50 - 128/66)  ABP(mean): 76 (02 May 2020 08:00) (63 - 86)  RR: 34 (02 May 2020 12:00) (34 - 35)  SpO2: 91% (02 May 2020 12:00) (91% - 100%)      ABG - ( 02 May 2020 03:35 )  pH, Arterial: 7.39  pH, Blood: x     /  pCO2: 63    /  pO2: 90    / HCO3: 37    / Base Excess: 10.5  /  SaO2: 97        05-01 @ 07:01  -  05-02 @ 07:00  --------------------------------------------------------  IN: 1301.6 mL / OUT: 1020 mL / NET: 281.6 mL        Mode: AC/ CMV (Assist Control/ Continuous Mandatory Ventilation)  RR (machine): 35  TV (machine): 400  FiO2: 70  PEEP: 10  ITime: 1  MAP: 16  PIP: 28      PHYSICAL EXAM:    GENERAL:   HEAD: atraumatic, normocephalic   ENMT: nasal mucosa- Oral cavity-   SKIN: warm, dry   CHEST/LUNG: ET tube in place  HEART: RRR, no m/r/g   ABDOMEN: soft, nontender, nondistended; bowel sounds.  : diggs catheter.  EXTREMITIES: +1 non-pitting edema, no cyanosis, no clubbing.  NEURO: sedated        LABS:  CBC Full  -  ( 02 May 2020 06:19 )  WBC Count : 21.15 K/uL  RBC Count : 2.72 M/uL  Hemoglobin : 8.2 g/dL  Hematocrit : 25.9 %  Platelet Count - Automated : 343 K/uL  Mean Cell Volume : 95.2 fl  Mean Cell Hemoglobin : 30.1 pg  Mean Cell Hemoglobin Concentration : 31.7 gm/dL  Auto Neutrophil # : 17.02 K/uL  Auto Lymphocyte # : 1.12 K/uL  Auto Monocyte # : 1.41 K/uL  Auto Eosinophil # : 0.24 K/uL  Auto Basophil # : 0.06 K/uL  Auto Neutrophil % : 80.5 %  Auto Lymphocyte % : 5.3 %  Auto Monocyte % : 6.7 %  Auto Eosinophil % : 1.1 %  Auto Basophil % : 0.3 %    05-02    135  |  96  |  15  ----------------------------<  90  3.4<L>   |  36<H>  |  0.52    Ca    7.7<L>      02 May 2020 06:19  Phos  1.6     05-02  Mg     2.5     05-02    TPro  5.6<L>  /  Alb  2.0<L>  /  TBili  0.8  /  DBili  x   /  AST  63<H>  /  ALT  95<H>  /  AlkPhos  258<H>  05-02            RADIOLOGY & ADDITIONAL STUDIES REVIEWED:     [ ]GOALS OF CARE DISCUSSION WITH PATIENT/FAMILY/PROXY:    CRITICAL CARE TIME SPENT: 35 minutes INTERVAL /OVERNIGHT EVENTS: 49 year old male with no acute events overnight.      PRESSORS: [ ] YES [x ] NO  WHICH:     ANTIBIOTICS:                  DATE STARTED:  ANTIBIOTICS:                  DATE STARTED:  ANTIBIOTICS:                  DATE STARTED:    Antimicrobial:  cefepime   IVPB 2000 milliGRAM(s) IV Intermittent every 8 hours    Cardiovascular:  phenylephrine    Infusion 0.1 MICROgram(s)/kG/Min IV Continuous <Continuous>    Pulmonary:    Hematalogic:  enoxaparin Injectable 70 milliGRAM(s) SubCutaneous every 12 hours    Other:  acetaminophen    Suspension .. 650 milliGRAM(s) Oral every 6 hours PRN  chlorhexidine 0.12% Liquid 15 milliLiter(s) Oral Mucosa every 12 hours  chlorhexidine 2% Cloths 1 Application(s) Topical <User Schedule>  HYDROmorphone Infusion. 4 mG/Hr IV Continuous <Continuous>  lactulose Syrup 10 Gram(s) Oral every 12 hours  pantoprazole  Injectable 40 milliGRAM(s) IV Push daily  propofol Infusion 10 MICROgram(s)/kG/Min IV Continuous <Continuous>  sodium chloride 0.9% lock flush 10 milliLiter(s) IV Push every 1 hour PRN    acetaminophen    Suspension .. 650 milliGRAM(s) Oral every 6 hours PRN  cefepime   IVPB 2000 milliGRAM(s) IV Intermittent every 8 hours  chlorhexidine 0.12% Liquid 15 milliLiter(s) Oral Mucosa every 12 hours  chlorhexidine 2% Cloths 1 Application(s) Topical <User Schedule>  enoxaparin Injectable 70 milliGRAM(s) SubCutaneous every 12 hours  HYDROmorphone Infusion. 4 mG/Hr IV Continuous <Continuous>  lactulose Syrup 10 Gram(s) Oral every 12 hours  pantoprazole  Injectable 40 milliGRAM(s) IV Push daily  phenylephrine    Infusion 0.1 MICROgram(s)/kG/Min IV Continuous <Continuous>  propofol Infusion 10 MICROgram(s)/kG/Min IV Continuous <Continuous>  sodium chloride 0.9% lock flush 10 milliLiter(s) IV Push every 1 hour PRN    Drug Dosing Weight  Height (cm): 157.48 (15 Apr 2020 13:15)  Weight (kg): 70.8 (15 Apr 2020 13:15)  BMI (kg/m2): 28.5 (15 Apr 2020 13:15)  BSA (m2): 1.72 (15 Apr 2020 13:15)    CENTRAL LINE: [x ] YES [ ] NO  LOCATION:   DATE INSERTED:  REMOVE: [ ] YES [ ] NO  EXPLAIN:    DIGGS: [x ] YES [ ] NO    DATE INSERTED:  REMOVE:  [ ] YES [ ] NO  EXPLAIN:    A-LINE:  [x ] YES [ ] NO  LOCATION:   DATE INSERTED:  REMOVE:  [ ] YES [ ] NO  EXPLAIN:      ICU Vital Signs Last 24 Hrs  T(C): 37.7 (02 May 2020 12:00), Max: 38.1 (02 May 2020 04:00)  T(F): 99.9 (02 May 2020 12:00), Max: 100.5 (02 May 2020 04:00)  HR: 122 (02 May 2020 12:00) (99 - 128)  BP: --  BP(mean): --  ABP: 122/59 (02 May 2020 12:00) (91/50 - 128/66)  ABP(mean): 76 (02 May 2020 08:00) (63 - 86)  RR: 34 (02 May 2020 12:00) (34 - 35)  SpO2: 91% (02 May 2020 12:00) (91% - 100%)      ABG - ( 02 May 2020 03:35 )  pH, Arterial: 7.39  pH, Blood: x     /  pCO2: 63    /  pO2: 90    / HCO3: 37    / Base Excess: 10.5  /  SaO2: 97        05-01 @ 07:01  -  05-02 @ 07:00  --------------------------------------------------------  IN: 1301.6 mL / OUT: 1020 mL / NET: 281.6 mL        Mode: AC/ CMV (Assist Control/ Continuous Mandatory Ventilation)  RR (machine): 35  TV (machine): 400  FiO2: 70  PEEP: 10  ITime: 1  MAP: 16  PIP: 28      PHYSICAL EXAM:    GENERAL:   HEAD: atraumatic, normocephalic   ENMT: nasal mucosa- Oral cavity-   SKIN: warm, dry   CHEST/LUNG: ET tube in place  HEART: RRR, no m/r/g   ABDOMEN: soft, nontender, nondistended; bowel sounds.  : diggs catheter.  EXTREMITIES: +1 non-pitting edema, no cyanosis, no clubbing.  NEURO: sedated        LABS:  CBC Full  -  ( 02 May 2020 06:19 )  WBC Count : 21.15 K/uL  RBC Count : 2.72 M/uL  Hemoglobin : 8.2 g/dL  Hematocrit : 25.9 %  Platelet Count - Automated : 343 K/uL  Mean Cell Volume : 95.2 fl  Mean Cell Hemoglobin : 30.1 pg  Mean Cell Hemoglobin Concentration : 31.7 gm/dL  Auto Neutrophil # : 17.02 K/uL  Auto Lymphocyte # : 1.12 K/uL  Auto Monocyte # : 1.41 K/uL  Auto Eosinophil # : 0.24 K/uL  Auto Basophil # : 0.06 K/uL  Auto Neutrophil % : 80.5 %  Auto Lymphocyte % : 5.3 %  Auto Monocyte % : 6.7 %  Auto Eosinophil % : 1.1 %  Auto Basophil % : 0.3 %    05-02    135  |  96  |  15  ----------------------------<  90  3.4<L>   |  36<H>  |  0.52    Ca    7.7<L>      02 May 2020 06:19  Phos  1.6     05-02  Mg     2.5     05-02    TPro  5.6<L>  /  Alb  2.0<L>  /  TBili  0.8  /  DBili  x   /  AST  63<H>  /  ALT  95<H>  /  AlkPhos  258<H>  05-02            RADIOLOGY & ADDITIONAL STUDIES REVIEWED:   < from: Xray Chest 1 View- PORTABLE-Routine (05.02.20 @ 10:35) >    EXAM:  XR CHEST PORTABLE ROUTINE 1V                            PROCEDURE DATE:  05/02/2020          INTERPRETATION:  CLINICAL INDICATION: 49 years  Male with + covid.    COMPARISON: 5/1/2020    The right costophrenic angles partially omitted.    The tip of the ET tube is in the mid trachea 5 cm above the lety. The NG tube extends below the diaphragm. Its tip is not included on the image. The right IJ catheter tip overlies the superior vena cava. A right pigtail thoracostomy tube is unchanged.    AP view of the chest demonstrates diffuse bilateral airspace infiltrates unchanged.. There is no pleural effusion. There is trace right apical pneumothorax measuring 6 mm, unchanged    The heart is normal in size. There is no mediastinal or hilar mass.     The pulmonary vasculature is normal.     Mild thoracic degenerative changes are present.    IMPRESSION:    Trace right apical pneumothorax unchanged.    Bilateral infiltrates unchanged.    Tubes and catheters in satisfactory position.        DISCRETE X-RAY DATA:  Percent of LEFT lung opacification: %  Percent of RIGHT lung opacification: %  Change in lung opacification from most recent x-ray (<=3 days): Stable  Change from prior dated 3 or more days (same admission): Stable      < end of copied text >      [ ]GOALS OF CARE DISCUSSION WITH PATIENT/FAMILY/PROXY:    CRITICAL CARE TIME SPENT: 35 minutes

## 2020-05-03 NOTE — PROGRESS NOTE ADULT - ASSESSMENT
48yo M man with no significant PMH admitted with acute hypoxic respiratory failure requiring intubation 2/2 covid-19 infection, hospital course c/b right PTX s/p placement of right  chest tube.    Neuro  - sedated on propofol, and Dilaudid   - wean down sedation and give versed pushes as needed  - possible sbt in am if pt awake and vent settings minimal    Cardiovascular  - keep map >65  - off pheny      Pulmonary  - Acute hypoxic respiratory failure secondary to covid  - s/p pigtail insertion for pneumo 4/29, +peripheral pneumothorax along the lung apex, lateral and inferior right lung measuring 3.1 cm at the apex. Dr. Hilliard aware - Suction increased to 40, monitor xrays  ABG - ( 02 May 2020 03:35 )  pH, Arterial: 7.39  pH, Blood: x     /  pCO2: 63    /  pO2: 90    / HCO3: 37    / Base Excess: 10.5  /  SaO2: 97   Vent: 30/400/50/10    Infections  - COVID-19 viral pneumonia. Completed Plaquenil, Solumedrol and Tocilizumab.    - monitor crp, ferritin, procalcitonin.    - Pt on cefepime   - IV tylenol q6 prn fever    Nephro  - No issues. Monitor urine output.       Gastrointestinal  -  TF    Heme  - Elevated D-dimer > 1000, continue full dose anticoagulation.     Endocrine  - Monitor finger sticks between 120-180.     Prophylaxis   - Full dose anticoagulation for elevated D-dimer. 48yo M man with no significant PMH admitted with acute hypoxic respiratory failure requiring intubation 2/2 covid-19 infection, hospital course c/b right PTX s/p placement of right  chest tube.    Neuro  - sedated on propofol, and Dilaudid   - wean sedation for sbt   - transition to precedex infusion and fentanyl prn for sedation    Cardiovascular  - keep map >65  - off pheny      Pulmonary  - Acute hypoxic respiratory failure secondary to covid  - s/p pigtail insertion for pneumo 4/29, +peripheral pneumothorax along the lung apex, lateral and inferior right lung measuring 3.1 cm at the apex. Dr. Hilliard aware - Suction increased to 40, monitor xrays  ABG - ( 02 May 2020 03:35 )  pH, Arterial: 7.39  pH, Blood: x     /  pCO2: 63    /  pO2: 90    / HCO3: 37    / Base Excess: 10.5  /  SaO2: 97   Vent: 30/400/50/10    Infections  - COVID-19 viral pneumonia. Completed Plaquenil, Solumedrol and Tocilizumab.    - monitor crp, ferritin, procalcitonin.    - Pt on cefepime   - IV tylenol q6 prn fever    Nephro  - No issues. Monitor urine output.       Gastrointestinal  -  TF    Heme  - Elevated D-dimer > 1000, continue full dose anticoagulation.     Endocrine  - Monitor finger sticks between 120-180.     Prophylaxis   - Full dose anticoagulation for elevated D-dimer. 48yo M man with no significant PMH admitted with acute hypoxic respiratory failure requiring intubation 2/2 covid-19 infection, hospital course c/b right PTX s/p placement of right  chest tube.    Neuro  - sedated on propofol, and Dilaudid   - wean sedation for sbt   - transition to precedex infusion and fentanyl prn for sedation    Cardiovascular  - keep map >65  - wean off pheny as jyoti      Pulmonary  - Acute hypoxic respiratory failure secondary to covid  - s/p pigtail insertion for pneumo   - f/u  xrays  - Vent: 30/400/50/10    Infections  - COVID-19 viral pneumonia. Completed Plaquenil, Solumedrol and Tocilizumab.    - monitor crp, ferritin, procalcitonin.    - Pt on cefepime   - start vancomycin 1 g Q12 hr   - IV tylenol q6 prn fever    Nephro  - Monitor urine output.     Gastrointestinal  - cont TF    Heme  - Elevated D-dimer > 1000  - cont full dose anticoagulation.     Endocrine  - Monitor finger sticks between 120-180.     Prophylaxis   - Full dose anticoagulation for elevated D-dimer.   - cont pantoprazole 50yo M man with no significant PMH admitted with acute hypoxic respiratory failure requiring intubation 2/2 covid-19 infection, hospital course c/b right PTX s/p placement of right  chest tube.    Neuro  - sedated on propofol, and fentanyl   - transition to precedex infusion and fentanyl prn for sedation    Cardiovascular  - keep map >65  - wean off pheny as jyoti      Pulmonary  - Acute hypoxic respiratory failure secondary to covid  - s/p pigtail insertion for pneumo   - f/u  xrays, pneumothorax   -chest tube   - Vent: 30/400/50/10    Infections  - COVID-19 viral pneumonia. Completed Plaquenil, Solumedrol and Tocilizumab.    - monitor crp, ferritin, procalcitonin.    - Pt on cefepime   - start vancomycin 1 g Q12 hr   -procal elevated   - IV tylenol q6 prn fever    Nephro  - Monitor urine output.     Gastrointestinal  - cont TF    Heme  - Elevated D-dimer > 1000  - cont full dose anticoagulation.     Endocrine  - Monitor finger sticks between 120-180.     Prophylaxis   - Full dose anticoagulation for elevated D-dimer.   - cont pantoprazole     addendum note:  patient was tachypneic and tachycardic, desaturation bucking the vent, he was given versed 4 mg IVP improved

## 2020-05-03 NOTE — PROGRESS NOTE ADULT - SUBJECTIVE AND OBJECTIVE BOX
Time of Visit:  Patient seen and examined.     MEDICATIONS  (STANDING):  cefepime   IVPB 2000 milliGRAM(s) IV Intermittent every 8 hours  chlorhexidine 0.12% Liquid 15 milliLiter(s) Oral Mucosa every 12 hours  chlorhexidine 2% Cloths 1 Application(s) Topical <User Schedule>  dexMEDEtomidine Infusion 0.2 MICROgram(s)/kG/Hr (3.54 mL/Hr) IV Continuous <Continuous>  enoxaparin Injectable 70 milliGRAM(s) SubCutaneous every 12 hours  fentaNYL   Infusion. 0.5 MICROgram(s)/kG/Hr (3.54 mL/Hr) IV Continuous <Continuous>  lactulose Syrup 10 Gram(s) Oral every 12 hours  midazolam Infusion 0.02 mG/kG/Hr (1.42 mL/Hr) IV Continuous <Continuous>  mupirocin 2% Nasal 1 Application(s) Nasal two times a day  pantoprazole  Injectable 40 milliGRAM(s) IV Push daily  phenylephrine    Infusion 0.1 MICROgram(s)/kG/Min (1.33 mL/Hr) IV Continuous <Continuous>  vancomycin  IVPB 1000 milliGRAM(s) IV Intermittent every 12 hours      MEDICATIONS  (PRN):  acetaminophen    Suspension .. 650 milliGRAM(s) Oral every 6 hours PRN Temp greater or equal to 38C (100.4F)  fentaNYL    Injectable 25 MICROGram(s) IV Push every 4 hours PRN Moderate Pain (4 - 6)  sodium chloride 0.9% lock flush 10 milliLiter(s) IV Push every 1 hour PRN Pre/post blood products, medications, blood draw, and to maintain line patency       Medications up to date at time of exam.      PHYSICAL EXAMINATION:  Patient has no new complaints.  GENERAL: The patient is a well-developed, well-nourished, in no apparent distress.     Vital Signs Last 24 Hrs  T(C): 37.8 (03 May 2020 16:00), Max: 39 (03 May 2020 02:55)  T(F): 100 (03 May 2020 16:00), Max: 102.2 (03 May 2020 02:55)  HR: 121 (03 May 2020 16:00) (100 - 128)  BP: 98/57 (03 May 2020 04:00) (90/45 - 125/99)  BP(mean): --  RR: 31 (03 May 2020 16:00) (26 - 35)  SpO2: 97% (03 May 2020 16:00) (95% - 99%)  Mode: AC/ CMV (Assist Control/ Continuous Mandatory Ventilation)  RR (machine): 30  TV (machine): 400  FiO2: 80  PEEP: 10  ITime: 1  MAP: 18  PIP: 23   (if applicable)    Chest Tube (if applicable)    HEENT: Head is normocephalic and atraumatic. Extraocular muscles are intact. Mucous membranes are moist.     NECK: Supple, no palpable adenopathy.    LUNGS: Clear to auscultation, no wheezing, rales, or rhonchi. Right chest tube    HEART: Regular rate and rhythm without murmur.    ABDOMEN: Soft, nontender, and nondistended.  No hepatosplenomegaly is noted.    : No painful voiding, no pelvic pain    EXTREMITIES: Without any cyanosis, clubbing, rash, lesions or edema.    NEUROLOGIC: sedated    SKIN: Warm, dry, good turgor.      LABS:                        7.6    26.85 )-----------( 318      ( 03 May 2020 04:15 )             23.7     05-03    140  |  100  |  10  ----------------------------<  118<H>  3.6   |  34<H>  |  0.48<L>    Ca    7.4<L>      03 May 2020 12:12  Phos  2.5     05-03  Mg     2.4     05-03    TPro  5.4<L>  /  Alb  1.8<L>  /  TBili  1.0  /  DBili  x   /  AST  58<H>  /  ALT  70<H>  /  AlkPhos  260<H>  05-03        ABG - ( 03 May 2020 12:24 )  pH, Arterial: 7.40  pH, Blood: x     /  pCO2: 55    /  pO2: 56    / HCO3: 34    / Base Excess: 8.5   /  SaO2: 89                          Procalcitonin, Serum: 1.26 ng/mL (05-03-20 @ 13:22)  Procalcitonin, Serum: 0.51 ng/mL (05-02-20 @ 11:02)  Procalcitonin, Serum: 0.62 ng/mL (05-01-20 @ 10:16)      MICROBIOLOGY: (if applicable)    RADIOLOGY & ADDITIONAL STUDIES:  EKG:   CXR:< from: Xray Chest 1 View-PORTABLE IMMEDIATE (05.03.20 @ 12:05) >    EXAM:  XR CHEST PORTABLE IMMED 1V                            PROCEDURE DATE:  05/03/2020          INTERPRETATION:    AP semierect chest on May 3, 2020 11:18 AM. Patient requires intubation. Patient tested positive for the Covid virus on April 11.    Heart size is within normal limits.    Endotracheal tube, nasogastric tube, right jugular line, and catheter right chest tube remain.    Diffuse advanced bilateral infiltrates are again noted similar to earlier in the day.    Earlier in the day there was a rather small right apical pneumothorax is diminished.    Diminished small right apical pneumothorax. Persistent diffuse advanced infiltrates.    SCRETE X-RAY DATA:  Percent of LEFT lung opacification: %  Percent of RIGHT lung opacification: %  Change in lung opacification from most recent x-ray (<=3 days): Stable  Change from prior dated 3 or more days (same admission): Increase                  OFELIA العراقي M.D., ATTENDING RADIOLOGIST  This document has been electronically signed. May  3 2020 12:11PM                < end of copied text >    ECHO:    IMPRESSION: 49y Male PAST MEDICAL & SURGICAL HISTORY:  No pertinent past medical history  No significant past surgical history   p/w         IMP:  This is a 49 yr old man with prior mentioned medical condition admitted with acute hypoxic resp failure due to b/l pna secondary to covid-19 infection. He was intubated 4/20.    S/p IL-6, plaquenil and  steroids .   Pat had new right PTX.  s./p R ight  chest tube     -acute hypoxic resp failure  -b/l pna  -covid-19 infection  -ARDS  -septic shock  -Right PTX 4/29      Plan:  -continue vent support with lung protective strategy  -continue antibx  -chest tube to suction   -decrease sedated / pain control  -SBT   -continue pressors to maintain MAP>65  -continue anticoag  -ngt feed  -trend biomarkers  -continue antibx  -f/u cultures .       case discussed with icu team

## 2020-05-03 NOTE — PROGRESS NOTE ADULT - SUBJECTIVE AND OBJECTIVE BOX
Pt s/p replace ET tube overnight due to leak.  Pt Sedated, vented    Pigtail on pleurovac suction, no air leak noted  output 370cc / 24 hrs    no crepitus  good air entry noted bilat                                     7.6    26.85 )-----------( 318      ( 03 May 2020 04:15 )             23.7         Assessment and Plan:   · Assessment		  Right pigtail pleural cath on suction due to hx ptx on ventolated patient covid +  DNR status  continue pigtail to suction  cxr  observation

## 2020-05-03 NOTE — AIRWAY PLACEMENT NOTE ADULT - POST AIRWAY PLACEMENT ASSESSMENT:
ett through cords visualized through cords by team with glidescope/chest excursion noted/breath sounds bilateral

## 2020-05-03 NOTE — PROGRESS NOTE ADULT - ATTENDING COMMENTS
acute hypoxic respiratory distress requiring intubation and sedation. Imaging c/w ARDS. Admitted to MICU for management.  #Sepsis 2/2 COVID+  neuro checks d/c versed, start precedex and fentanyl PRN 50 ucg  continue propofol   d/c dilaudid  CV phenyleprhine, keep MAP > 65 mmhg   #COVID+  - COVID isolation protocol  #Hypoxic respiratory failure 2/2 ARDS 2/2 COVID+  - CXR on arrival with BL multilobar interstitial opacities c/w ARDS 2/2 COVID+   intubated and sedated for increased work of breathing now breathing in sync with vent  - continue to trend ABG with vent adjustments  #ARDS  - as above  - current vent settings: VC/AC with FIO2 % 80 (6cc/kg IBW), RR 30, PEEP10 ,   - CXR slightly worsened   - continue to closely monitor pt  - worsening apical pneumothorax, chest tube in place, will follow   renal :   ID: febrile, on cefepime emperic cefepime day 8, cx neg so far, will panculture, if cx negative de/c cefepime   start vancomycin 1 g Q12 hr   #Sepsis 2/2 COVID+s  E: Replete K<4, Mg<2  N: start TF   G: protonix 40mg IV daily  lovenox     Code: FULL CODE

## 2020-05-03 NOTE — CHART NOTE - NSCHARTNOTEFT_GEN_A_CORE
Writer notified that pt with rectal temp 108.3. Stat cooling blanket, ice and acetaminophen with decreasing temperature. Increasing pressor requirements, noted kinked chest tube, when repositioned MAP quickly improved. Repeat labs pending, ABG 7.29/66/78/31 on 30/400/100/10.

## 2020-05-03 NOTE — PROGRESS NOTE ADULT - SUBJECTIVE AND OBJECTIVE BOX
Patient Discussed on Morning Rounds with      Primary Diagnosis: COVID Pneumonia    SUBJECTIVE:  49yMale    Interval Events:    OBJECTIVE:  Vitals: ICU Vital Signs Last 24 Hrs  T(C): 39 (03 May 2020 02:55), Max: 39 (03 May 2020 02:55)  T(F): 102.2 (03 May 2020 02:55), Max: 102.2 (03 May 2020 02:55)  HR: 106 (03 May 2020 08:00) (90 - 128)  BP: 98/57 (03 May 2020 04:00) (90/45 - 125/99)  BP(mean): --  ABP: 105/74 (03 May 2020 08:00) (105/74 - 122/59)  ABP(mean): 75 (02 May 2020 16:00) (75 - 75)  RR: 31 (03 May 2020 08:00) (26 - 35)  SpO2: 97% (03 May 2020 08:00) (91% - 100%)    I&O:  I&O's Detail    02 May 2020 07:01  -  03 May 2020 07:00  --------------------------------------------------------  IN:    Enteral Tube Flush: 420 mL    HYDROmorphone Infusion.: 40 mL    HYDROmorphone Infusion.: 22 mL    ns in tub fed  [id number]: 399 mL    phenylephrine   Infusion: 421.2 mL    propofol Infusion: 499.7 mL    rocuronium Infusion: 6.6 mL    Solution: 583.1 mL    Solution: 100 mL    Solution: 100 mL  Total IN: 2591.6 mL    OUT:    Chest Tube: 390 mL    Indwelling Catheter - Urethral: 1920 mL  Total OUT: 2310 mL    Total NET: 281.6 mL        VENTILATOR SETTINGS:  Mode: AC/ CMV (Assist Control/ Continuous Mandatory Ventilation)  RR (machine): 30  TV (machine): 400  FiO2: 70  PEEP: 10  ITime: 1  MAP: 12  PIP: 25    ABG - ( 03 May 2020 03:56 )  pH, Arterial: 7.40  pH, Blood: x     /  pCO2: 54    /  pO2: 65    / HCO3: 33    / Base Excess: 7.7   /  SaO2: 93                  PHYSICAL EXAM: Physical Exam performed by Intensivist to minimize risk of exposure.  Please refer to Attending Attestation for comprehensive exam.   General: No acute distress  Neuro: [Sedated]  CV: RRR  Pulm: [INTUBATED]  : [Bowie]  Psych: affect appropriate    LABS:                         7.6    26.85 )-----------( 318      ( 03 May 2020 04:15 )             23.7   05-03    139  |  100  |  10  ----------------------------<  117<H>  3.2<L>   |  35<H>  |  0.55    Ca    7.4<L>      03 May 2020 04:15  Phos  1.5     05-03  Mg     2.4     05-03    TPro  5.4<L>  /  Alb  1.8<L>  /  TBili  1.0  /  DBili  x   /  AST  58<H>  /  ALT  70<H>  /  AlkPhos  260<H>  05-03    Ferritin, Serum: 1665 ng/mL (05-02-20 @ 10:35)  ABG - ( 03 May 2020 03:56 )  pH, Arterial: 7.40  pH, Blood: x     /  pCO2: 54    /  pO2: 65    / HCO3: 33    / Base Excess: 7.7   /  SaO2: 93                CAPILLARY BLOOD GLUCOSE      POCT Blood Glucose.: 119 mg/dL (02 May 2020 17:44)  POCT Blood Glucose.: 134 mg/dL (02 May 2020 11:20)     MEDICATIONS  (STANDING):  cefepime   IVPB 2000 milliGRAM(s) IV Intermittent every 8 hours  chlorhexidine 0.12% Liquid 15 milliLiter(s) Oral Mucosa every 12 hours  chlorhexidine 2% Cloths 1 Application(s) Topical <User Schedule>  enoxaparin Injectable 70 milliGRAM(s) SubCutaneous every 12 hours  HYDROmorphone Infusion. 2 mG/Hr (2 mL/Hr) IV Continuous <Continuous>  lactulose Syrup 10 Gram(s) Oral every 12 hours  mupirocin 2% Nasal 1 Application(s) Nasal two times a day  pantoprazole  Injectable 40 milliGRAM(s) IV Push daily  phenylephrine    Infusion 0.1 MICROgram(s)/kG/Min (1.33 mL/Hr) IV Continuous <Continuous>  potassium chloride  10 mEq/100 mL IVPB 10 milliEquivalent(s) IV Intermittent every 1 hour  propofol Infusion 10 MICROgram(s)/kG/Min (4.25 mL/Hr) IV Continuous <Continuous>    MEDICATIONS  (PRN):  acetaminophen    Suspension .. 650 milliGRAM(s) Oral every 6 hours PRN Temp greater or equal to 38C (100.4F)  midazolam Injectable 2 milliGRAM(s) IV Push every 4 hours PRN agitation  sodium chloride 0.9% lock flush 10 milliLiter(s) IV Push every 1 hour PRN Pre/post blood products, medications, blood draw, and to maintain line patency      RADIOLOGY/OTHER STUDIES: Patient Discussed on Morning Rounds with      Primary Diagnosis: COVID Pneumonia    SUBJECTIVE:  49yMale    Interval Events: ET exchanged, febrile     T(C): 39 (05-03-20 @ 02:55), Max: 39 (05-03-20 @ 02:55)  HR: 105 (05-03-20 @ 09:17) (90 - 128)  BP: 98/57 (05-03-20 @ 04:00) (90/45 - 125/99)  RR: 31 (05-03-20 @ 08:00) (26 - 35)  SpO2: 97% (05-03-20 @ 09:17) (91% - 100%)  05-02-20 @ 07:01  -  05-03-20 @ 07:00  --------------------------------------------------------  IN: 2591.6 mL / OUT: 2310 mL / NET: 281.6 mL    acetaminophen    Suspension .. 650 milliGRAM(s) Oral every 6 hours PRN  cefepime   IVPB 2000 milliGRAM(s) IV Intermittent every 8 hours  chlorhexidine 0.12% Liquid 15 milliLiter(s) Oral Mucosa every 12 hours  chlorhexidine 2% Cloths 1 Application(s) Topical <User Schedule>  dexMEDEtomidine Infusion 0.2 MICROgram(s)/kG/Hr IV Continuous <Continuous>  enoxaparin Injectable 70 milliGRAM(s) SubCutaneous every 12 hours  fentaNYL    Injectable 25 MICROGram(s) IV Push every 4 hours PRN  lactulose Syrup 10 Gram(s) Oral every 12 hours  mupirocin 2% Nasal 1 Application(s) Nasal two times a day  pantoprazole  Injectable 40 milliGRAM(s) IV Push daily  phenylephrine    Infusion 0.1 MICROgram(s)/kG/Min IV Continuous <Continuous>  potassium chloride  10 mEq/100 mL IVPB 10 milliEquivalent(s) IV Intermittent every 1 hour  sodium chloride 0.9% lock flush 10 milliLiter(s) IV Push every 1 hour PRN  Mode: AC/ CMV (Assist Control/ Continuous Mandatory Ventilation), RR (machine): 30, TV (machine): 400, FiO2: 70, PEEP: 10, ITime: 1, MAP: 12, PIP: 27      VENTILATOR SETTINGS:  Mode: AC/ CMV (Assist Control/ Continuous Mandatory Ventilation)  RR (machine): 30  TV (machine): 400  FiO2: 70  PEEP: 10  ITime: 1  MAP: 12  PIP: 25    ABG - ( 03 May 2020 03:56 )  pH, Arterial: 7.40  pH, Blood: x     /  pCO2: 54    /  pO2: 65    / HCO3: 33    / Base Excess: 7.7   /  SaO2: 93                  PHYSICAL EXAM: Physical Exam performed by Intensivist to minimize risk of exposure.  Please refer to Attending Attestation for comprehensive exam.   General: No acute distress  Neuro: [Sedated]  CV: RRR  Pulm: [INTUBATED]  : [Bowie]  Psych: affect appropriate    LABS:                         7.6    26.85 )-----------( 318      ( 03 May 2020 04:15 )             23.7   05-03    139  |  100  |  10  ----------------------------<  117<H>  3.2<L>   |  35<H>  |  0.55    Ca    7.4<L>      03 May 2020 04:15  Phos  1.5     05-03  Mg     2.4     05-03    TPro  5.4<L>  /  Alb  1.8<L>  /  TBili  1.0  /  DBili  x   /  AST  58<H>  /  ALT  70<H>  /  AlkPhos  260<H>  05-03    Ferritin, Serum: 1665 ng/mL (05-02-20 @ 10:35)  ABG - ( 03 May 2020 03:56 )  pH, Arterial: 7.40  pH, Blood: x     /  pCO2: 54    /  pO2: 65    / HCO3: 33    / Base Excess: 7.7   /  SaO2: 93                CAPILLARY BLOOD GLUCOSE      POCT Blood Glucose.: 119 mg/dL (02 May 2020 17:44)  POCT Blood Glucose.: 134 mg/dL (02 May 2020 11:20)     MEDICATIONS  (STANDING):  cefepime   IVPB 2000 milliGRAM(s) IV Intermittent every 8 hours  chlorhexidine 0.12% Liquid 15 milliLiter(s) Oral Mucosa every 12 hours  chlorhexidine 2% Cloths 1 Application(s) Topical <User Schedule>  enoxaparin Injectable 70 milliGRAM(s) SubCutaneous every 12 hours  HYDROmorphone Infusion. 2 mG/Hr (2 mL/Hr) IV Continuous <Continuous>  lactulose Syrup 10 Gram(s) Oral every 12 hours  mupirocin 2% Nasal 1 Application(s) Nasal two times a day  pantoprazole  Injectable 40 milliGRAM(s) IV Push daily  phenylephrine    Infusion 0.1 MICROgram(s)/kG/Min (1.33 mL/Hr) IV Continuous <Continuous>  potassium chloride  10 mEq/100 mL IVPB 10 milliEquivalent(s) IV Intermittent every 1 hour  propofol Infusion 10 MICROgram(s)/kG/Min (4.25 mL/Hr) IV Continuous <Continuous>    MEDICATIONS  (PRN):  acetaminophen    Suspension .. 650 milliGRAM(s) Oral every 6 hours PRN Temp greater or equal to 38C (100.4F)  midazolam Injectable 2 milliGRAM(s) IV Push every 4 hours PRN agitation  sodium chloride 0.9% lock flush 10 milliLiter(s) IV Push every 1 hour PRN Pre/post blood products, medications, blood draw, and to maintain line patency      RADIOLOGY/OTHER STUDIES: Patient Discussed on Morning Rounds with Dr. Nath    Primary Diagnosis: COVID Pneumonia    SUBJECTIVE:  Pt seen and examined.     Interval Events: ET exchanged, febrile     T(C): 39 (05-03-20 @ 02:55), Max: 39 (05-03-20 @ 02:55)  HR: 105 (05-03-20 @ 09:17) (90 - 128)  BP: 98/57 (05-03-20 @ 04:00) (90/45 - 125/99)  RR: 31 (05-03-20 @ 08:00) (26 - 35)  SpO2: 97% (05-03-20 @ 09:17) (91% - 100%)  05-02-20 @ 07:01  -  05-03-20 @ 07:00  --------------------------------------------------------  IN: 2591.6 mL / OUT: 2310 mL / NET: 281.6 mL    acetaminophen    Suspension .. 650 milliGRAM(s) Oral every 6 hours PRN  cefepime   IVPB 2000 milliGRAM(s) IV Intermittent every 8 hours  chlorhexidine 0.12% Liquid 15 milliLiter(s) Oral Mucosa every 12 hours  chlorhexidine 2% Cloths 1 Application(s) Topical <User Schedule>  dexMEDEtomidine Infusion 0.2 MICROgram(s)/kG/Hr IV Continuous <Continuous>  enoxaparin Injectable 70 milliGRAM(s) SubCutaneous every 12 hours  fentaNYL    Injectable 25 MICROGram(s) IV Push every 4 hours PRN  lactulose Syrup 10 Gram(s) Oral every 12 hours  mupirocin 2% Nasal 1 Application(s) Nasal two times a day  pantoprazole  Injectable 40 milliGRAM(s) IV Push daily  phenylephrine    Infusion 0.1 MICROgram(s)/kG/Min IV Continuous <Continuous>  potassium chloride  10 mEq/100 mL IVPB 10 milliEquivalent(s) IV Intermittent every 1 hour  sodium chloride 0.9% lock flush 10 milliLiter(s) IV Push every 1 hour PRN  Mode: AC/ CMV (Assist Control/ Continuous Mandatory Ventilation), RR (machine): 30, TV (machine): 400, FiO2: 70, PEEP: 10, ITime: 1, MAP: 12, PIP: 27    VENTILATOR SETTINGS:  Mode: AC/ CMV (Assist Control/ Continuous Mandatory Ventilation)  RR (machine): 30  TV (machine): 400  FiO2: 70  PEEP: 10  ITime: 1  MAP: 12  PIP: 25    ABG - ( 03 May 2020 03:56 )  pH, Arterial: 7.40  pH, Blood: x     /  pCO2: 54    /  pO2: 65    / HCO3: 33    / Base Excess: 7.7   /  SaO2: 93        PHYSICAL EXAM:   General: No acute distress  Neuro: Sedated  CV: RRR  Pulm: INTUBATED  ABDOMEN: soft, nontender, nondistended; bowel sounds.  : [place]  EXTREMITIES: +1 non-pitting edema, no cyanosis, no clubbing.    LABS:                         7.6    26.85 )-----------( 318      ( 03 May 2020 04:15 )             23.7   05-03    139  |  100  |  10  ----------------------------<  117<H>  3.2<L>   |  35<H>  |  0.55    Ca    7.4<L>      03 May 2020 04:15  Phos  1.5     05-03  Mg     2.4     05-03    TPro  5.4<L>  /  Alb  1.8<L>  /  TBili  1.0  /  DBili  x   /  AST  58<H>  /  ALT  70<H>  /  AlkPhos  260<H>  05-03    Ferritin, Serum: 1665 ng/mL (05-02-20 @ 10:35)  ABG - ( 03 May 2020 03:56 )  pH, Arterial: 7.40  pH, Blood: x     /  pCO2: 54    /  pO2: 65    / HCO3: 33    / Base Excess: 7.7   /  SaO2: 93        CAPILLARY BLOOD GLUCOSE    POCT Blood Glucose.: 119 mg/dL (02 May 2020 17:44)  POCT Blood Glucose.: 134 mg/dL (02 May 2020 11:20)     MEDICATIONS  (STANDING):  cefepime   IVPB 2000 milliGRAM(s) IV Intermittent every 8 hours  chlorhexidine 0.12% Liquid 15 milliLiter(s) Oral Mucosa every 12 hours  chlorhexidine 2% Cloths 1 Application(s) Topical <User Schedule>  enoxaparin Injectable 70 milliGRAM(s) SubCutaneous every 12 hours  HYDROmorphone Infusion. 2 mG/Hr (2 mL/Hr) IV Continuous <Continuous>  lactulose Syrup 10 Gram(s) Oral every 12 hours  mupirocin 2% Nasal 1 Application(s) Nasal two times a day  pantoprazole  Injectable 40 milliGRAM(s) IV Push daily  phenylephrine    Infusion 0.1 MICROgram(s)/kG/Min (1.33 mL/Hr) IV Continuous <Continuous>  potassium chloride  10 mEq/100 mL IVPB 10 milliEquivalent(s) IV Intermittent every 1 hour  propofol Infusion 10 MICROgram(s)/kG/Min (4.25 mL/Hr) IV Continuous <Continuous>    MEDICATIONS  (PRN):  acetaminophen    Suspension .. 650 milliGRAM(s) Oral every 6 hours PRN Temp greater or equal to 38C (100.4F)  midazolam Injectable 2 milliGRAM(s) IV Push every 4 hours PRN agitation  sodium chloride 0.9% lock flush 10 milliLiter(s) IV Push every 1 hour PRN Pre/post blood products, medications, blood draw, and to maintain line patency      RADIOLOGY/OTHER STUDIES:

## 2020-05-04 NOTE — PROGRESS NOTE ADULT - ATTENDING COMMENTS
50 y/o  COVID + intubated / sedated     Neuro  - sedated on propofol, versed, fentanyl   Cardiovascular  - keep map >65  - On levophed   Pulmonary  - Acute hypoxic respiratory failure secondary to covid  - s/p pigtail insertion for pneumo 4/29, +peripheral pneumothorax along the lung apex, lateral and inferior right lung measuring 3.1 cm at the apex. Dr. Millard aware - Suction increased to 40, monitor xrays  ABG - ( 04 May 2020 04:20 )  pH, Arterial: 7.34  pH, Blood: x     /  pCO2: 61    /  pO2: 65    / HCO3: 32    / Base Excess: 5.6   /  SaO2: 91      Vent: 30/400/80/8  Infections  - COVID-19 viral pneumonia. Completed Plaquenil, Solumedrol and Tocilizumab.    - monitor crp, ferritin, procalcitonin.    - Pt on cefepime   - IV tylenol q6 prn fever    Nephro  - No issues. Monitor urine output.       Gastrointestinal  -  TF  ID-  Leukocytosis worsening   started on vanco   follow up vanco through   - follow up blood cultures     Heme  - Elevated D-dimer > 1000, continue full dose anticoagulation.     Endocrine  - Monitor finger sticks between 120-180.     Prophylaxis   - Full dose anticoagulation for elevated D-dimer.

## 2020-05-04 NOTE — CONSULT NOTE ADULT - ASSESSMENT
Septic Shock  COVID -19 infection  Pneumonia/ARDS  Fevers  Leukocytosis      Plan - Cont Vancomycin 1 gm iv q12hrs  Cont Maxipime 2 gms iv q8hrs  cont pressor support  Prognosis is grave and mortality is expected. Septic Shock  COVID -19 infection  Pneumonia/ARDS  Fevers  Leukocytosis      Plan - Cont Vancomycin 1 gm iv q12hrs  Cont Maxipime 2 gms iv q8hrs  Start Flagyl 500mgs iv q8hrs.  cont pressor support  Prognosis is grave and mortality is expected.

## 2020-05-04 NOTE — PROGRESS NOTE ADULT - ASSESSMENT
Neuro  - sedated on propofol, versed, fentanyl       Cardiovascular  - keep map >65  - On levophed       Pulmonary  - Acute hypoxic respiratory failure secondary to covid  - s/p pigtail insertion for pneumo 4/29, +peripheral pneumothorax along the lung apex, lateral and inferior right lung measuring 3.1 cm at the apex. Dr. Millard aware - Suction increased to 40, monitor xrays  ABG - ( 04 May 2020 04:20 )  pH, Arterial: 7.34  pH, Blood: x     /  pCO2: 61    /  pO2: 65    / HCO3: 32    / Base Excess: 5.6   /  SaO2: 91      Vent: 30/400/80/8  Infections  - COVID-19 viral pneumonia. Completed Plaquenil, Solumedrol and Tocilizumab.    - monitor crp, ferritin, procalcitonin.    - Pt on cefepime   - IV tylenol q6 prn fever    Nephro  - No issues. Monitor urine output.       Gastrointestinal  -  TF  ID-  Leukocytosis worsening   started on vanco   follow up vanco through   - follow up blood cultures     Heme  - Elevated D-dimer > 1000, continue full dose anticoagulation.     Endocrine  - Monitor finger sticks between 120-180.     Prophylaxis   - Full dose anticoagulation for elevated D-dimer.

## 2020-05-04 NOTE — CONSULT NOTE ADULT - SUBJECTIVE AND OBJECTIVE BOX
HPI:  49 years old Male patient from home, who is known COVID positive from 04/10/2019 w/ no significant PMHx or PSHx presents to the ED with fever and headache. Patient reports he has been sick for the past x2 weeks with SOB. Patient adds he is COVID-19 positive and endorses generalized weakness. Patient came to ED 5 days ago with chest pain and shortness of breath and was tested for COVID 19. Patient was saturating fine and was discharged from ED. Patient denies any other complaints.  Patient denies any chest pain, palpitations, abdominal pain, nausea, vomiting abdominal pain , change in urinary or bowel habits.  Patient is admitted with acute respiratory failure secondary to COVID-19 infection requiring 3L NC saturating 96 percent. Will monitor respiratory status. COVID 19 here today is negative. (15 Apr 2020 18:47)      PAST MEDICAL & SURGICAL HISTORY:  No pertinent past medical history  No significant past surgical history      No Known Allergies      Meds:  acetaminophen    Suspension .. 650 milliGRAM(s) Oral every 6 hours PRN  cefepime   IVPB 2000 milliGRAM(s) IV Intermittent every 8 hours  chlorhexidine 0.12% Liquid 15 milliLiter(s) Oral Mucosa every 12 hours  chlorhexidine 2% Cloths 1 Application(s) Topical <User Schedule>  enoxaparin Injectable 70 milliGRAM(s) SubCutaneous every 12 hours  fentaNYL    Injectable 25 MICROGram(s) IV Push every 4 hours PRN  fentaNYL   Infusion. 1 MICROgram(s)/kG/Hr IV Continuous <Continuous>  lactulose Syrup 10 Gram(s) Oral every 12 hours  midazolam Infusion 0.02 mG/kG/Hr IV Continuous <Continuous>  mupirocin 2% Nasal 1 Application(s) Nasal two times a day  norepinephrine Infusion 0.05 MICROgram(s)/kG/Min IV Continuous <Continuous>  pantoprazole  Injectable 40 milliGRAM(s) IV Push daily  propofol Infusion 10 MICROgram(s)/kG/Min IV Continuous <Continuous>  sodium chloride 0.9% lock flush 10 milliLiter(s) IV Push every 1 hour PRN  vancomycin  IVPB 1000 milliGRAM(s) IV Intermittent every 12 hours      SOCIAL HISTORY:  Smoker:    ETOH use:     FAMILY HISTORY:      VITALS:  Vital Signs Last 24 Hrs  T(C): 37.4 (04 May 2020 16:00), Max: 42.3 (03 May 2020 20:30)  T(F): 99.4 (04 May 2020 16:00), Max: 108.2 (03 May 2020 20:30)  HR: 116 (04 May 2020 16:00) (85 - 160)  BP: --  BP(mean): --  RR: 34 (04 May 2020 16:00) (34 - 88)  SpO2: 91% (04 May 2020 16:00) (65% - 100%)    LABS/DIAGNOSTIC TESTS:                          8.1    30.64 )-----------( 250      ( 04 May 2020 05:50 )             25.8     WBC Count: 30.64 K/uL (05-04 @ 05:50)  WBC Count: 36.86 K/uL (05-04 @ 00:15)  WBC Count: 26.85 K/uL (05-03 @ 04:15)  WBC Count: 28.59 K/uL (05-02 @ 20:41)  WBC Count: 21.15 K/uL (05-02 @ 06:19)      05-04    141  |  101  |  16  ----------------------------<  155<H>  3.4<L>   |  31  |  0.64    Ca    7.0<L>      04 May 2020 05:50  Phos  4.4     05-04  Mg     2.4     05-04    TPro  6.0  /  Alb  1.7<L>  /  TBili  2.2<H>  /  DBili  x   /  AST  49<H>  /  ALT  58  /  AlkPhos  226<H>  05-04          LIVER FUNCTIONS - ( 04 May 2020 05:50 )  Alb: 1.7 g/dL / Pro: 6.0 g/dL / ALK PHOS: 226 U/L / ALT: 58 U/L DA / AST: 49 U/L / GGT: x             PT/INR - ( 04 May 2020 00:15 )   PT: 15.7 sec;   INR: 1.38 ratio         PTT - ( 04 May 2020 00:15 )  PTT:37.4 sec    LACTATE:    ABG - ABG - ( 04 May 2020 04:20 )  pH, Arterial: 7.34  pH, Blood: x     /  pCO2: 61    /  pO2: 65    / HCO3: 32    / Base Excess: 5.6   /  SaO2: 91                  CULTURES:   .Blood Blood-Peripheral  05-03 @ 12:32   No growth to date.  --  --      .Blood Blood  04-23 @ 09:58   No Growth Final  --  --          RADIOLOGY:< from: Xray Chest 1 View- PORTABLE-Routine (05.04.20 @ 06:50) >  EXAM:  XR CHEST PORTABLE ROUTINE 1V                            PROCEDURE DATE:  05/04/2020          INTERPRETATION:    Examination: XR CHEST    History: ADMDIAG1: J18.9 PNEUMONIA, UNSPECIFIED ORGANISM/, COVID INTUBATED    Portable chest x-ray is compared to a previous examination dated 5/3/2020.    Impression: Stable ET tube, NG tube and right IJ central venous catheter.     Stable right chest tube. Stable small right apical pneumothorax.    Airspace opacifications in both lungs, grossly unchanged.    New small left pleural effusion.    The trachea is midline.    Stable cardiac silhouette.      DISCRETE X-RAY DATA:  Percent of LEFT lung opacification: %  Percent of RIGHT lung opacification: %  Change in lung opacification from most recent x-ray (<=3 days): Stable  Change from prior dated 3 or more days (same admission): Increase                  ROCKY CARBAJAL M.D., ATTENDING RADIOLOGIST  This document has been electronically signed. May  4 2020  9:08AM                < end of copied text >        ROS  [  ] UNABLE TO ELICIT HPI:  49 years old Male patient from home, who is known COVID positive from 04/10/2019 w/ no significant PMHx or PSHx presents to the ED with fever and headache. Patient reports he has been sick for the past x2 weeks with SOB. Patient adds he is COVID-19 positive and endorses generalized weakness. Patient came to ED 5 days ago with chest pain and shortness of breath and was tested for COVID 19. Patient was saturating fine and was discharged from ED. Patient denies any other complaints.  Patient denies any chest pain, palpitations, abdominal pain, nausea, vomiting abdominal pain , change in urinary or bowel habits.  Patient is admitted with acute respiratory failure secondary to COVID-19 infection requiring 3L NC saturating 96 percent. Will monitor respiratory status. COVID 19 here today is negative. (15 Apr 2020 18:47)      History as above, Pt who was admitted with above symptoms has progressed in his disease and is now in the ICU , intubated and vent dependent , he is sedated and and on 2 pressors and is very Tachypneic and is in respiratory distress despite being on the vent. He spiked a fever as high as 108.2!! and then became hypothermic. He has an elevated WBC count which has been increasing.    PAST MEDICAL & SURGICAL HISTORY:  No pertinent past medical history  No significant past surgical history      No Known Allergies      Meds:  acetaminophen    Suspension .. 650 milliGRAM(s) Oral every 6 hours PRN  cefepime   IVPB 2000 milliGRAM(s) IV Intermittent every 8 hours  chlorhexidine 0.12% Liquid 15 milliLiter(s) Oral Mucosa every 12 hours  chlorhexidine 2% Cloths 1 Application(s) Topical <User Schedule>  enoxaparin Injectable 70 milliGRAM(s) SubCutaneous every 12 hours  fentaNYL    Injectable 25 MICROGram(s) IV Push every 4 hours PRN  fentaNYL   Infusion. 1 MICROgram(s)/kG/Hr IV Continuous <Continuous>  lactulose Syrup 10 Gram(s) Oral every 12 hours  midazolam Infusion 0.02 mG/kG/Hr IV Continuous <Continuous>  mupirocin 2% Nasal 1 Application(s) Nasal two times a day  norepinephrine Infusion 0.05 MICROgram(s)/kG/Min IV Continuous <Continuous>  pantoprazole  Injectable 40 milliGRAM(s) IV Push daily  propofol Infusion 10 MICROgram(s)/kG/Min IV Continuous <Continuous>  sodium chloride 0.9% lock flush 10 milliLiter(s) IV Push every 1 hour PRN  vancomycin  IVPB 1000 milliGRAM(s) IV Intermittent every 12 hours      SOCIAL HISTORY: unknown      FAMILY HISTORY: unknown      VITALS:  Vital Signs Last 24 Hrs  T(C): 37.4 (04 May 2020 16:00), Max: 42.3 (03 May 2020 20:30)  T(F): 99.4 (04 May 2020 16:00), Max: 108.2 (03 May 2020 20:30)  HR: 116 (04 May 2020 16:00) (85 - 160)  BP: --  BP(mean): --  RR: 34 (04 May 2020 16:00) (34 - 88)  SpO2: 91% (04 May 2020 16:00) (65% - 100%)    LABS/DIAGNOSTIC TESTS:                          8.1    30.64 )-----------( 250      ( 04 May 2020 05:50 )             25.8     WBC Count: 30.64 K/uL (05-04 @ 05:50)  WBC Count: 36.86 K/uL (05-04 @ 00:15)  WBC Count: 26.85 K/uL (05-03 @ 04:15)  WBC Count: 28.59 K/uL (05-02 @ 20:41)  WBC Count: 21.15 K/uL (05-02 @ 06:19)      05-04    141  |  101  |  16  ----------------------------<  155<H>  3.4<L>   |  31  |  0.64    Ca    7.0<L>      04 May 2020 05:50  Phos  4.4     05-04  Mg     2.4     05-04    TPro  6.0  /  Alb  1.7<L>  /  TBili  2.2<H>  /  DBili  x   /  AST  49<H>  /  ALT  58  /  AlkPhos  226<H>  05-04          LIVER FUNCTIONS - ( 04 May 2020 05:50 )  Alb: 1.7 g/dL / Pro: 6.0 g/dL / ALK PHOS: 226 U/L / ALT: 58 U/L DA / AST: 49 U/L / GGT: x             PT/INR - ( 04 May 2020 00:15 )   PT: 15.7 sec;   INR: 1.38 ratio         PTT - ( 04 May 2020 00:15 )  PTT:37.4 sec    LACTATE:    ABG - ABG - ( 04 May 2020 04:20 )  pH, Arterial: 7.34  pH, Blood: x     /  pCO2: 61    /  pO2: 65    / HCO3: 32    / Base Excess: 5.6   /  SaO2: 91                  CULTURES:   .Blood Blood-Peripheral  05-03 @ 12:32   No growth to date.  --  --      .Blood Blood  04-23 @ 09:58   No Growth Final  --  --          RADIOLOGY:< from: Xray Chest 1 View- PORTABLE-Routine (05.04.20 @ 06:50) >  EXAM:  XR CHEST PORTABLE ROUTINE 1V                            PROCEDURE DATE:  05/04/2020          INTERPRETATION:    Examination: XR CHEST    History: ADMDIAG1: J18.9 PNEUMONIA, UNSPECIFIED ORGANISM/, COVID INTUBATED    Portable chest x-ray is compared to a previous examination dated 5/3/2020.    Impression: Stable ET tube, NG tube and right IJ central venous catheter.     Stable right chest tube. Stable small right apical pneumothorax.    Airspace opacifications in both lungs, grossly unchanged.    New small left pleural effusion.    The trachea is midline.    Stable cardiac silhouette.      DISCRETE X-RAY DATA:  Percent of LEFT lung opacification: %  Percent of RIGHT lung opacification: %  Change in lung opacification from most recent x-ray (<=3 days): Stable  Change from prior dated 3 or more days (same admission): Increase                  ROCKY CARBAJAL M.D., ATTENDING RADIOLOGIST  This document has been electronically signed. May  4 2020  9:08AM                < end of copied text >        ROS  [ x ] UNABLE TO ELICIT

## 2020-05-04 NOTE — PROGRESS NOTE ADULT - SUBJECTIVE AND OBJECTIVE BOX
Pt Sedated, vented    Pigtail on pleurovac suction, no air leak noted  300cc pleural fluid 24 hrs,    no crepitus  good air entry noted bilat    continue CT to suction  daily cxr  observation

## 2020-05-04 NOTE — PROGRESS NOTE ADULT - SUBJECTIVE AND OBJECTIVE BOX
Time of Visit:  Patient seen and examined.     MEDICATIONS  (STANDING):  cefepime   IVPB 2000 milliGRAM(s) IV Intermittent every 8 hours  chlorhexidine 0.12% Liquid 15 milliLiter(s) Oral Mucosa every 12 hours  chlorhexidine 2% Cloths 1 Application(s) Topical <User Schedule>  enoxaparin Injectable 70 milliGRAM(s) SubCutaneous every 12 hours  fentaNYL   Infusion. 1 MICROgram(s)/kG/Hr (7.08 mL/Hr) IV Continuous <Continuous>  lactulose Syrup 10 Gram(s) Oral every 12 hours  midazolam Infusion 0.02 mG/kG/Hr (1.42 mL/Hr) IV Continuous <Continuous>  mupirocin 2% Nasal 1 Application(s) Nasal two times a day  norepinephrine Infusion 0.05 MICROgram(s)/kG/Min (3.32 mL/Hr) IV Continuous <Continuous>  pantoprazole  Injectable 40 milliGRAM(s) IV Push daily  propofol Infusion 10 MICROgram(s)/kG/Min (4.25 mL/Hr) IV Continuous <Continuous>  vancomycin  IVPB 1000 milliGRAM(s) IV Intermittent every 12 hours      MEDICATIONS  (PRN):  acetaminophen    Suspension .. 650 milliGRAM(s) Oral every 6 hours PRN Temp greater or equal to 38C (100.4F)  fentaNYL    Injectable 25 MICROGram(s) IV Push every 4 hours PRN Moderate Pain (4 - 6)  sodium chloride 0.9% lock flush 10 milliLiter(s) IV Push every 1 hour PRN Pre/post blood products, medications, blood draw, and to maintain line patency       Medications up to date at time of exam.      PHYSICAL EXAMINATION:  Patient has no new complaints.  GENERAL: The patient is a well-developed, well-nourished, in no apparent distress.     Vital Signs Last 24 Hrs  T(C): 37.2 (04 May 2020 16:00), Max: 42.3 (03 May 2020 20:30)  T(F): 99 (04 May 2020 16:00), Max: 108.2 (03 May 2020 20:30)  HR: 99 (04 May 2020 15:18) (85 - 160)  BP: --  BP(mean): --  RR: 88 (04 May 2020 12:00) (88 - 88)  SpO2: 92% (04 May 2020 15:18) (65% - 100%)  Mode: AC/ CMV (Assist Control/ Continuous Mandatory Ventilation)  RR (machine): 30  TV (machine): 400  FiO2: 80  PEEP: 11  ITime: 1  MAP: 13  PIP: 27   (if applicable)    Chest Tube (if applicable)    HEENT: Head is normocephalic and atraumatic. Extraocular muscles are intact. Mucous membranes are moist.     NECK: Supple, no palpable adenopathy.    LUNGS: Clear to auscultation, no wheezing, rales, or rhonchi.    HEART: Regular rate and rhythm without murmur.    ABDOMEN: Soft, nontender, and nondistended.  No hepatosplenomegaly is noted.    : No painful voiding, no pelvic pain    EXTREMITIES: Without any cyanosis, clubbing, rash, lesions or edema.    NEUROLOGIC: Awake, alert, oriented, grossly intact    SKIN: Warm, dry, good turgor.      LABS:                        8.1    30.64 )-----------( 250      ( 04 May 2020 05:50 )             25.8     05-04    141  |  101  |  16  ----------------------------<  155<H>  3.4<L>   |  31  |  0.64    Ca    7.0<L>      04 May 2020 05:50  Phos  4.4     05-04  Mg     2.4     05-04    TPro  6.0  /  Alb  1.7<L>  /  TBili  2.2<H>  /  DBili  x   /  AST  49<H>  /  ALT  58  /  AlkPhos  226<H>  05-04    PT/INR - ( 04 May 2020 00:15 )   PT: 15.7 sec;   INR: 1.38 ratio         PTT - ( 04 May 2020 00:15 )  PTT:37.4 sec    ABG - ( 04 May 2020 04:20 )  pH, Arterial: 7.34  pH, Blood: x     /  pCO2: 61    /  pO2: 65    / HCO3: 32    / Base Excess: 5.6   /  SaO2: 91                    D-Dimer Assay, Quantitative: 1302 ng/mL DDU (05-04-20 @ 05:50)        Procalcitonin, Serum: 1.26 ng/mL (05-03-20 @ 13:22)  Procalcitonin, Serum: 0.51 ng/mL (05-02-20 @ 11:02)      MICROBIOLOGY: (if applicable)    RADIOLOGY & ADDITIONAL STUDIES:  EKG:   CXR:  ECHO:    IMPRESSION: 49y Male PAST MEDICAL & SURGICAL HISTORY:  No pertinent past medical history  No significant past surgical history   p/w           RECOMMENDATIONS:

## 2020-05-04 NOTE — PROGRESS NOTE ADULT - SUBJECTIVE AND OBJECTIVE BOX
INTERVAL /OVERNIGHT EVENTS: 49 year old male, febrile to 108 overnight. Hypoxic, increasing O2 requirements, started on empiric antibiotics      PRESSORS: [X ] YES [ ] NO  WHICH:     ANTIBIOTICS: Vancomycin                  DATE STARTED: 5/3  ANTIBIOTICS:   Cefepime               DATE STARTED: 4/25  ANTIBIOTICS:                  DATE STARTED:    Antimicrobial:  cefepime   IVPB 2000 milliGRAM(s) IV Intermittent every 8 hours    Cardiovascular:  Levophed     Pulmonary:    Hematalogic:  enoxaparin Injectable 70 milliGRAM(s) SubCutaneous every 12 hours    Other:  acetaminophen    Suspension .. 650 milliGRAM(s) Oral every 6 hours PRN  chlorhexidine 0.12% Liquid 15 milliLiter(s) Oral Mucosa every 12 hours  chlorhexidine 2% Cloths 1 Application(s) Topical <User Schedule>  HYDROmorphone Infusion. 4 mG/Hr IV Continuous <Continuous>  lactulose Syrup 10 Gram(s) Oral every 12 hours  pantoprazole  Injectable 40 milliGRAM(s) IV Push daily  propofol Infusion 10 MICROgram(s)/kG/Min IV Continuous <Continuous>  sodium chloride 0.9% lock flush 10 milliLiter(s) IV Push every 1 hour PRN    acetaminophen    Suspension .. 650 milliGRAM(s) Oral every 6 hours PRN  cefepime   IVPB 2000 milliGRAM(s) IV Intermittent every 8 hours  chlorhexidine 0.12% Liquid 15 milliLiter(s) Oral Mucosa every 12 hours  chlorhexidine 2% Cloths 1 Application(s) Topical <User Schedule>  enoxaparin Injectable 70 milliGRAM(s) SubCutaneous every 12 hours  HYDROmorphone Infusion. 4 mG/Hr IV Continuous <Continuous>  lactulose Syrup 10 Gram(s) Oral every 12 hours  pantoprazole  Injectable 40 milliGRAM(s) IV Push daily  phenylephrine    Infusion 0.1 MICROgram(s)/kG/Min IV Continuous <Continuous>  propofol Infusion 10 MICROgram(s)/kG/Min IV Continuous <Continuous>  sodium chloride 0.9% lock flush 10 milliLiter(s) IV Push every 1 hour PRN    Drug Dosing Weight  Height (cm): 157.48 (15 Apr 2020 13:15)  Weight (kg): 70.8 (15 Apr 2020 13:15)  BMI (kg/m2): 28.5 (15 Apr 2020 13:15)  BSA (m2): 1.72 (15 Apr 2020 13:15)    CENTRAL LINE: [x ] YES [ ] NO  LOCATION:   DATE INSERTED:  REMOVE: [ ] YES [ ] NO  EXPLAIN:    DIGGS: [x ] YES [ ] NO    DATE INSERTED:  REMOVE:  [ ] YES [ ] NO  EXPLAIN:    A-LINE:  [x ] YES [ ] NO  LOCATION:   DATE INSERTED:  REMOVE:  [ ] YES [ ] NO  EXPLAIN:    ICU Vital Signs Last 24 Hrs  T(C): 37.1 (04 May 2020 12:00), Max: 42.3 (03 May 2020 20:30)  T(F): 98.7 (04 May 2020 12:00), Max: 108.2 (03 May 2020 20:30)  HR: 105 (04 May 2020 12:00) (85 - 160)  BP: --  BP(mean): --  ABP: 99/57 (04 May 2020 12:00) (79/45 - 144/80)  ABP(mean): --  RR: 88 (04 May 2020 12:00) (31 - 88)  SpO2: 90% (04 May 2020 09:47) (65% - 100%)      I&O's Detail    03 May 2020 07:01  -  04 May 2020 07:00  --------------------------------------------------------  IN:    dexmedetomidine Infusion: 318 mL    Enteral Tube Flush: 30 mL    fentaNYL Infusion.: 169.9 mL    midazolam Infusion: 22.4 mL    norepinephrine Infusion: 113.2 mL    ns in tub fed  [id number]: 360 mL    phenylephrine   Infusion: 358.4 mL    propofol Infusion: 306 mL    vasopressin Infusion: 19.2 mL  Total IN: 1697.1 mL    OUT:    Chest Tube: 300 mL    Indwelling Catheter - Urethral: 3350 mL  Total OUT: 3650 mL    Total NET: -1952.9 mL      Mode: AC/ CMV (Assist Control/ Continuous Mandatory Ventilation)  RR (machine): 30  TV (machine): 420  FiO2: 70  PEEP: 8  ITime: 1  MAP: 11  PIP: 19    ABG - ( 04 May 2020 04:20 )  pH, Arterial: 7.34  pH, Blood: x     /  pCO2: 61    /  pO2: 65    / HCO3: 32    / Base Excess: 5.6   /  SaO2: 91            Physical Exam:   GENERAL: Intubated sedated   HEAD: atraumatic, normocephalic   ENMT: nasal mucosa- Oral cavity-   SKIN: warm, dry   CHEST/LUNG: ET tube in place  HEART: RRR, no m/r/g   ABDOMEN: soft, nontender, nondistended; bowel sounds.  : diggs catheter.  EXTREMITIES: +1 non-pitting edema, no cyanosis, no clubbing.  NEURO: sedated        LABS:                        8.1    30.64 )-----------( 250      ( 04 May 2020 05:50 )             25.8   05-04    141  |  101  |  16  ----------------------------<  155<H>  3.4<L>   |  31  |  0.64    Ca    7.0<L>      04 May 2020 05:50  Phos  4.4     05-04  Mg     2.4     05-04    TPro  6.0  /  Alb  1.7<L>  /  TBili  2.2<H>  /  DBili  x   /  AST  49<H>  /  ALT  58  /  AlkPhos  226<H>  05-04      RADIOLOGY & ADDITIONAL STUDIES REVIEWED:   < from: Xray Chest 1 View- PORTABLE-Routine (05.04.20 @ 06:50) >  EXAM:  XR CHEST PORTABLE ROUTINE 1V                            PROCEDURE DATE:  05/04/2020          INTERPRETATION:    Examination: XR CHEST    History: ADMDIAG1: J18.9 PNEUMONIA, UNSPECIFIED ORGANISM/, COVID INTUBATED    Portable chest x-ray is compared to a previous examination dated 5/3/2020.    Impression: Stable ET tube, NG tube and right IJ central venous catheter.     Stable right chest tube. Stable small right apical pneumothorax.    Airspace opacifications in both lungs, grossly unchanged.    New small left pleural effusion.    The trachea is midline.    Stable cardiac silhouette.      DISCRETE X-RAY DATA:  Percent of LEFT lung opacification: %  Percent of RIGHT lung opacification: %  Change in lung opacification from most recent x-ray (<=3 days): Stable  Change from prior dated 3 or more days (same admission): Increase    < end of copied text >      [ ]GOALS OF CARE DISCUSSION WITH PATIENT/FAMILY/PROXY:    CRITICAL CARE TIME SPENT: 35 minutes

## 2020-05-05 NOTE — PROGRESS NOTE ADULT - ASSESSMENT
49 year old male s/p right pigtail chest tube 4/29. COVID +    - continue pigtail to wall suction while intubated  - daily CXR  - respiratory care per ICU  - COVID care  - will discuss with Dr. Millard

## 2020-05-05 NOTE — PROGRESS NOTE ADULT - SUBJECTIVE AND OBJECTIVE BOX
s/p right pigtail chest tube 4/29  Patient seen and examined at bedside in ICU    Vital Signs Last 24 Hrs  T(F): 97.6 (05-05-20 @ 04:00), Max: 100 (05-04-20 @ 20:00)  HR: 115 (05-05-20 @ 09:35)  RR: 30 (05-05-20 @ 02:14)  SpO2: 90% (05-05-20 @ 09:35)  POCT Blood Glucose.: 151 mg/dL (04 May 2020 17:42)    GENERAL: sedated  CHEST/LUNG: intubated on vent. Right pigtail chest tube in place. +air leak. output 190ml/24hours    I&O's Detail    04 May 2020 07:01  -  05 May 2020 07:00  --------------------------------------------------------  IN:    Enteral Tube Flush: 110 mL    fentaNYL Infusion.: 443.5 mL    midazolam Infusion: 57.6 mL    norepinephrine Infusion: 406 mL    ns in tub fed  [id number]: 345 mL    Packed Red Blood Cells: 500 mL    propofol Infusion: 568.5 mL    Solution: 500 mL    Solution: 150 mL    Solution: 250 mL  Total IN: 3330.6 mL    OUT:    Chest Tube: 190 mL    Indwelling Catheter - Urethral: 2700 mL  Total OUT: 2890 mL    Total NET: 440.6 mL    LABS:                        7.9    17.93 )-----------( 220      ( 05 May 2020 06:47 )             25.1     05-05    146<H>  |  107  |  12  ----------------------------<  98  3.2<L>   |  35<H>  |  0.54    Ca    7.3<L>      05 May 2020 06:47  Phos  3.0     05-05  Mg     2.5     05-05    TPro  5.4<L>  /  Alb  1.5<L>  /  TBili  1.2  /  DBili  x   /  AST  31  /  ALT  37  /  AlkPhos  162<H>  05-05    PT/INR - ( 04 May 2020 20:45 )   PT: 14.1 sec;   INR: 1.24 ratio       PTT - ( 04 May 2020 20:45 )  PTT:39.7 sec    RADIOLOGY & ADDITIONAL STUDIES:  AM CXR pending

## 2020-05-05 NOTE — PROGRESS NOTE ADULT - SUBJECTIVE AND OBJECTIVE BOX
INTERVAL /OVERNIGHT EVENTS: Received 1 unit prbc overnight     PRESSORS: [X ] YES [ ] NO  WHICH:     ANTIBIOTICS: Vancomycin                  DATE STARTED: 5/3  ANTIBIOTICS:   Cefepime               DATE STARTED: 4/25  ANTIBIOTICS:                  DATE STARTED:    Antimicrobial:  cefepime   IVPB 2000 milliGRAM(s) IV Intermittent every 8 hours    Cardiovascular:  Levophed     Pulmonary:    Hematalogic:  enoxaparin Injectable 70 milliGRAM(s) SubCutaneous every 12 hours    Other:  acetaminophen    Suspension .. 650 milliGRAM(s) Oral every 6 hours PRN  chlorhexidine 0.12% Liquid 15 milliLiter(s) Oral Mucosa every 12 hours  chlorhexidine 2% Cloths 1 Application(s) Topical <User Schedule>  HYDROmorphone Infusion. 4 mG/Hr IV Continuous <Continuous>  lactulose Syrup 10 Gram(s) Oral every 12 hours  pantoprazole  Injectable 40 milliGRAM(s) IV Push daily  propofol Infusion 10 MICROgram(s)/kG/Min IV Continuous <Continuous>  sodium chloride 0.9% lock flush 10 milliLiter(s) IV Push every 1 hour PRN    acetaminophen    Suspension .. 650 milliGRAM(s) Oral every 6 hours PRN  cefepime   IVPB 2000 milliGRAM(s) IV Intermittent every 8 hours  chlorhexidine 0.12% Liquid 15 milliLiter(s) Oral Mucosa every 12 hours  chlorhexidine 2% Cloths 1 Application(s) Topical <User Schedule>  enoxaparin Injectable 70 milliGRAM(s) SubCutaneous every 12 hours  HYDROmorphone Infusion. 4 mG/Hr IV Continuous <Continuous>  lactulose Syrup 10 Gram(s) Oral every 12 hours  pantoprazole  Injectable 40 milliGRAM(s) IV Push daily  phenylephrine    Infusion 0.1 MICROgram(s)/kG/Min IV Continuous <Continuous>  propofol Infusion 10 MICROgram(s)/kG/Min IV Continuous <Continuous>  sodium chloride 0.9% lock flush 10 milliLiter(s) IV Push every 1 hour PRN    Drug Dosing Weight  Height (cm): 157.48 (15 Apr 2020 13:15)  Weight (kg): 70.8 (15 Apr 2020 13:15)  BMI (kg/m2): 28.5 (15 Apr 2020 13:15)  BSA (m2): 1.72 (15 Apr 2020 13:15)    CENTRAL LINE: [x ] YES [ ] NO  LOCATION:   DATE INSERTED:  REMOVE: [ ] YES [ ] NO  EXPLAIN:    DIGGS: [x ] YES [ ] NO    DATE INSERTED:  REMOVE:  [ ] YES [ ] NO  EXPLAIN:    A-LINE:  [x ] YES [ ] NO  LOCATION:   DATE INSERTED:  REMOVE:  [ ] YES [ ] NO  EXPLAIN:    ICU Vital Signs Last 24 Hrs  T(C): 37.2 (05 May 2020 11:37), Max: 37.8 (04 May 2020 20:00)  T(F): 98.9 (05 May 2020 11:37), Max: 100 (04 May 2020 20:00)  HR: 109 (05 May 2020 11:37) (80 - 116)  BP: --  BP(mean): --  ABP: 106/59 (05 May 2020 11:37) (102/94 - 134/71)  ABP(mean): --  RR: 31 (05 May 2020 11:37) (30 - 41)  SpO2: 92% (05 May 2020 11:37) (90% - 99%)    I&O's Detail    04 May 2020 07:01  -  05 May 2020 07:00  --------------------------------------------------------  IN:    Enteral Tube Flush: 110 mL    fentaNYL Infusion.: 443.5 mL    midazolam Infusion: 57.6 mL    norepinephrine Infusion: 406 mL    ns in tub fed  [id number]: 345 mL    Packed Red Blood Cells: 500 mL    propofol Infusion: 568.5 mL    Solution: 500 mL    Solution: 150 mL    Solution: 250 mL  Total IN: 3330.6 mL    OUT:    Chest Tube: 190 mL    Indwelling Catheter - Urethral: 2700 mL  Total OUT: 2890 mL    Total NET: 440.6 mL      05 May 2020 07:01  -  05 May 2020 14:50  --------------------------------------------------------  IN:    fentaNYL Infusion.: 116 mL    midazolam Infusion: 20 mL    norepinephrine Infusion: 32 mL    ns in tub fed  [id number]: 60 mL    propofol Infusion: 100 mL    Solution: 300 mL  Total IN: 628 mL    OUT:  Total OUT: 0 mL    Total NET: 628 mL        Mode: AC/ CMV (Assist Control/ Continuous Mandatory Ventilation)  RR (machine): 30  TV (machine): 420  FiO2: 70  PEEP: 10  ITime: 1  MAP: 15  PIP: 28    ABG - ( 04 May 2020 04:20 )  pH, Arterial: 7.34  pH, Blood: x     /  pCO2: 61    /  pO2: 65    / HCO3: 32    / Base Excess: 5.6   /  SaO2: 91                Physical Exam:   GENERAL: Intubated sedated   HEAD: atraumatic, normocephalic   ENMT: nasal mucosa- Oral cavity-   SKIN: warm, dry   CHEST/LUNG: ET tube in place  HEART: RRR, no m/r/g   < from: Xray Chest 1 View- PORTABLE-Urgent (05.05.20 @ 10:40) >    EXAM:  XR CHEST PORTABLE URGENT 1V                            PROCEDURE DATE:  05/05/2020          INTERPRETATION:  History: Status post RIGHT pigtail catheter    Chest:  one view.      Comparison: 05/04/2020    AP radiograph of the chest demonstrates unchanged RIGHT pigtail catheter. Tiny RIGHT pneumothorax unchanged. Diffuse alveolar infiltrates unchanged. Lines and tubes in satisfactory position. The cardiac silhouette is normal in size. Osseous structures are intact.    Impression:unchangedRIGHT pigtail catheter. Tiny RIGHT pneumothorax unchanged. Diffuse alveolar infiltrates unchanged. Lines and tubes in satisfactory position.                ESTELLE TRUONG M.D., ATTENDING RADIOLOGIST  This document has been electronically signed. May5 2020 10:45AM        < end of copied text >  ABDOMEN: soft, nontender, nondistended; bowel sounds.  : diggs catheter.  EXTREMITIES: +1 non-pitting edema, no cyanosis, no clubbing.  NEURO: sedated        LABS:                                        7.9    17.93 )-----------( 220      ( 05 May 2020 06:47 )             25.1   05-05    146<H>  |  107  |  12  ----------------------------<  98  3.2<L>   |  35<H>  |  0.54    Ca    7.3<L>      05 May 2020 06:47  Phos  3.0     05-05  Mg     2.5     05-05    TPro  5.4<L>  /  Alb  1.5<L>  /  TBili  1.2  /  DBili  x   /  AST  31  /  ALT  37  /  AlkPhos  162<H>  05-05      RADIOLOGY & ADDITIONAL STUDIES REVIEWED:     CRITICAL CARE TIME SPENT: 35 minutes

## 2020-05-05 NOTE — PROGRESS NOTE ADULT - ASSESSMENT
Neuro  - sedated on propofol, versed, fentanyl       Cardiovascular  - keep map >65  - On levophed       Pulmonary  - Acute hypoxic respiratory failure secondary to covid  - s/p pigtail insertion for pneumo 4/29, +peripheral pneumothorax along the lung apex    Vent: 30/400/80/8  Infections  - COVID-19 viral pneumonia. Completed Plaquenil, Solumedrol and Tocilizumab.    - monitor crp, ferritin,   -procalcitonin 198  - Pt on cefepime   - IV tylenol q6 prn fever    Nephro  - No issues 2.7L out   hypokalemia   Gastrointestinal  -  TF  ID-  Leukocytosis worsening    follow up vanco through    follow up blood cultures   continue cefepime     Heme  - Elevated D-dimer > 1000, continue full dose anticoagulation.     Endocrine  - Monitor finger sticks between 120-180.     Prophylaxis   - Full dose anticoagulation for elevated D-dimer.

## 2020-05-05 NOTE — CHART NOTE - NSCHARTNOTEFT_GEN_A_CORE
Pt with hemoglobin drop 8.1 --> 7.0, on .18mcg/kg/min of levophed. 1 unit of packed rbc ordered for transfusion, HR/BP/pressor requirement without appreciated change since start of transfusion (approx 50mL transfused). Pt on temperature regulating blanket and remains between 99-100F, however concern for possible masked fever and increased work of breathing (which was also appreciated the night 5/3-4). Discussed with Dr. Cheung, will continue transfusion with close monitoring at slowed rate and give 50mg IVP Benadryl. Pt with hemoglobin drop 8.1 --> 7.0, on .18mcg/kg/min of levophed. 1 unit of packed rbc ordered for transfusion, HR/BP/pressor requirement without appreciated change since start of transfusion (approx 50mL transfused). Pt on temperature regulating blanket and remains between 99-100F, however concern for possible masked fever and increased work of breathing (which was also appreciated the night 5/3-4). Discussed with Dr. Rodriguez will continue transfusion with close monitoring at slowed rate and give 50mg IVP Benadryl.

## 2020-05-05 NOTE — CHART NOTE - NSCHARTNOTEFT_GEN_A_CORE
Writer called pt's documented healthcare proxy at approx [phone] on 5/5 [place] at [phone] via phone  (Japanese) and obtained consent for blood transfusion, verified by bedside RN Jil.

## 2020-05-05 NOTE — CHART NOTE - NSCHARTNOTEFT_GEN_A_CORE
Assessment:   Patient is a 49y old  Male who presents with a chief complaint of fever cough (05 May 2020 09:50). Pt intubated. Covid +. Jevity 1.5 @15 ml, Propofol @ 25 ml (Flow records). (if Propofol @25 ml/hr x24 hrs= 660 kcals       Factors impacting intake: [ ] none [ ] nausea  [ ] vomiting [ ] diarrhea [ ] constipation  [ ]chewing problems [ ] swallowing issues  [x ] other: intubated, critically ill.    Diet Prescription: Diet, NPO with Tube Feed:   Tube Feeding Modality: Nasogastric  Jevity 1.5 William  Total Volume for 24 Hours (mL): 360  Continuous  Starting Tube Feed Rate {mL per Hour}: 15  Until Goal Tube Feed Rate (mL per Hour): 15  Tube Feed Duration (in Hours): 24  Tube Feed Start Time: 18:00  No Carb Prosource (1pkg = 15gms Protein)     Qty per Day:  tid (20 @ 17:14)        Daily Height in cm: 157.48 (15 Apr 2020 13:15)      Daily Weight in k (01 May 2020 08:00)  Weight in k.7 (2020 08:00)  Weight in k.8 (2020 11:55)    % Weight Change: 1+ generalized edema noted.    Pertinent Medications: MEDICATIONS  (STANDING):  cefepime   IVPB 2000 milliGRAM(s) IV Intermittent every 8 hours  chlorhexidine 0.12% Liquid 15 milliLiter(s) Oral Mucosa every 12 hours  chlorhexidine 2% Cloths 1 Application(s) Topical <User Schedule>  enoxaparin Injectable 70 milliGRAM(s) SubCutaneous every 12 hours  fentaNYL   Infusion. 1 MICROgram(s)/kG/Hr (7.08 mL/Hr) IV Continuous <Continuous>  lactulose Syrup 10 Gram(s) Oral every 12 hours  midazolam Infusion 0.02 mG/kG/Hr (1.42 mL/Hr) IV Continuous <Continuous>  mupirocin 2% Nasal 1 Application(s) Nasal two times a day  norepinephrine Infusion 0.05 MICROgram(s)/kG/Min (3.32 mL/Hr) IV Continuous <Continuous>  pantoprazole  Injectable 40 milliGRAM(s) IV Push daily  propofol Infusion 10 MICROgram(s)/kG/Min (4.25 mL/Hr) IV Continuous <Continuous>  senna 1 Tablet(s) Oral two times a day  vancomycin  IVPB 1000 milliGRAM(s) IV Intermittent every 12 hours    MEDICATIONS  (PRN):  acetaminophen    Suspension .. 650 milliGRAM(s) Oral every 6 hours PRN Temp greater or equal to 38C (100.4F)  fentaNYL    Injectable 25 MICROGram(s) IV Push every 4 hours PRN Moderate Pain (4 - 6)  sodium chloride 0.9% lock flush 10 milliLiter(s) IV Push every 1 hour PRN Pre/post blood products, medications, blood draw, and to maintain line patency    Pertinent Labs:  Na146 mmol/L<H> Glu 98 mg/dL K+ 3.2 mmol/L<L> Cr  0.54 mg/dL BUN 12 mg/dL  Phos 3.0 mg/dL  Alb 1.5 g/dL<L>  Chol 128 mg/dL LDL 78 mg/dL HDL 24 mg/dL<L> Trig 130 mg/dL     CAPILLARY BLOOD GLUCOSE      POCT Blood Glucose.: 125 mg/dL (05 May 2020 11:19)  POCT Blood Glucose.: 151 mg/dL (04 May 2020 17:42)        Estimated Needs:   [x ] no change since previous assessment  [x ] recalculated: ()      Previous Nutrition Diagnosis:   [ ] Altered GI function  [ ]Inadequate Oral Intake [ ] Swallowing Difficulty   [ ] Altered nutrition related labs [ ] Increased Nutrient Needs [ ] Overweight/Obesity   [ ] Unintended Weight Loss [ ] Food & Nutrition Related Knowledge Deficit [x ] Malnutrition (moderate)  [ ] Other:     Nutrition Diagnosis is [x ] ongoing  [ ] resolved [ ] not applicable       Interventions:   Recommend  [ ] Change Diet To:  [ ] Nutrition Supplement  [x ] Nutrition Support: With Propofol @25 ml/hr: Jevity 1.5 15x24 + 1 Pkt Prosource TID (Jevity 1.5 360 ml, 540 kcals, 23 gm protein). 3 Pkt Prosource add 45 gm protein, 180 kcals. MD to monitor. RD available.          Monitoring and Evaluation:    [ x ] Tolerance to diet prescription [ x ] weights [ x ] labs[ x ] follow up per protocol  [ ] other:

## 2020-05-06 NOTE — CHART NOTE - NSCHARTNOTEFT_GEN_A_CORE
Reassessment:   49yMalePatient is a 49y old  Male who presents with a chief complaint of fever cough (05 May 2020 14:45)    Pt intubated, sedated, with CT.     Factors impacting intake: [ ] none [ ] nausea  [ ] vomiting [ ] diarrhea [ ] constipation  [ ]chewing problems [ ] swallowing issues  [ ] other:     Diet Prescription: Diet, NPO with Tube Feed:   Tube Feeding Modality: Nasogastric  Jevity 1.5 William  Total Volume for 24 Hours (mL): 1200  Continuous  Starting Tube Feed Rate {mL per Hour}: 15  Increase Tube Feed Rate by (mL): 10     Every 2 hours  Until Goal Tube Feed Rate (mL per Hour): 50  Tube Feed Duration (in Hours): 24  Tube Feed Start Time: 21:50  No Carb Prosource (1pkg = 15gms Protein)     Qty per Day:  tid (20 @ 21:41)    Intake: Per flow records pt receiving TF at 50ml/hkq33htk; 1800kcal, 77g pro. Propofol @ 25.8yvh68hsu (673kcal). Prosource TID (180kcal, 45g pro). With propofol at 25.5/TF@50/prosource: 2653kcal/d, 122g pro. Calories/protein in excess of needs. Please see recs below. +1 generalized edema noted.     Daily Weight in k (01 May 2020 08:00)  Weight in k.7 (2020 08:00)    % Weight Change    Pertinent Medications: MEDICATIONS  (STANDING):  albumin human 25% IVPB 100 milliLiter(s) IV Intermittent once  cefepime   IVPB 2000 milliGRAM(s) IV Intermittent every 8 hours  chlorhexidine 0.12% Liquid 15 milliLiter(s) Oral Mucosa every 12 hours  chlorhexidine 2% Cloths 1 Application(s) Topical <User Schedule>  enoxaparin Injectable 70 milliGRAM(s) SubCutaneous every 12 hours  fentaNYL   Infusion. 1 MICROgram(s)/kG/Hr (7.08 mL/Hr) IV Continuous <Continuous>  furosemide   Injectable 40 milliGRAM(s) IV Push once  lactulose Syrup 10 Gram(s) Oral every 12 hours  midazolam Infusion 0.02 mG/kG/Hr (1.42 mL/Hr) IV Continuous <Continuous>  mupirocin 2% Nasal 1 Application(s) Nasal two times a day  norepinephrine Infusion 0.05 MICROgram(s)/kG/Min (3.32 mL/Hr) IV Continuous <Continuous>  pantoprazole  Injectable 40 milliGRAM(s) IV Push daily  potassium chloride   Powder 40 milliEquivalent(s) Oral two times a day  propofol Infusion 10 MICROgram(s)/kG/Min (4.25 mL/Hr) IV Continuous <Continuous>  senna 1 Tablet(s) Oral two times a day  vancomycin  IVPB 1000 milliGRAM(s) IV Intermittent every 12 hours    MEDICATIONS  (PRN):  acetaminophen    Suspension .. 650 milliGRAM(s) Oral every 6 hours PRN Temp greater or equal to 38C (100.4F)  sodium chloride 0.9% lock flush 10 milliLiter(s) IV Push every 1 hour PRN Pre/post blood products, medications, blood draw, and to maintain line patency    Pertinent Labs:  Na142 mmol/L Glu 121 mg/dL<H> K+ 3.1 mmol/L<L> Cr  0.56 mg/dL BUN 12 mg/dL  Phos 1.9 mg/dL<L> -06 Alb 1.6 g/dL<L> 04-16 Chol 128 mg/dL LDL 78 mg/dL HDL 24 mg/dL<L> Trig 130 mg/dL     CAPILLARY BLOOD GLUCOSE      POCT Blood Glucose.: 125 mg/dL (05 May 2020 11:19)    Skin: DTI to right heel fifth toe    Estimated Needs:   [X ] no change since previous assessment  15-20kcal 1062-1416kcal/d, 1-1.2g/kg protein 70.8-85g/d  [ ] recalculated:     Previous Nutrition Diagnosis:   [ ] Inadequate Energy Intake [ ]Inadequate Oral Intake [ ] Excessive Energy Intake   [ ] Underweight [ ] Increased Nutrient Needs [ ] Overweight/Obesity   [ ] Altered GI Function [ ] Unintended Weight Loss [ ] Food & Nutrition Related Knowledge Deficit [ X] Malnutrition     Nutrition Diagnosis is [X ] ongoing  [ ] resolved [ ] not applicable     New Nutrition Diagnosis: [ ] not applicable       Interventions:   Recommend  [ ] Change Diet To:  [ ] Nutrition Supplement  [ X] Nutrition Support Rec Jevity 1.5 @36bxn42 hrs to provide 540kcal, 23g pro, 274ml free water. Prosource 1pkt TID (180kcal, 45g pro). With propofol at 25.5/TF@15/prosource TID total kcal/pro: 1393kcal, 68g pro.   [ ] Other:     Monitoring and Evaluation:   [ ] PO intake [ x ] Tolerance to diet prescription [ x ] weights [ x ] labs[ x ] follow up per protocol  [ ] other: Reassessment:   49yMalePatient is a 49y old  Male who presents with a chief complaint of fever cough (05 May 2020 14:45)    Pt intubated, sedated, with CT.     Factors impacting intake: [ ] none [ ] nausea  [ ] vomiting [ ] diarrhea [ ] constipation  [ ]chewing problems [ ] swallowing issues  [ ] other:     Diet Prescription: Diet, NPO with Tube Feed:   Tube Feeding Modality: Nasogastric  Jevity 1.5 William  Total Volume for 24 Hours (mL): 1200  Continuous  Starting Tube Feed Rate {mL per Hour}: 15  Increase Tube Feed Rate by (mL): 10     Every 2 hours  Until Goal Tube Feed Rate (mL per Hour): 50  Tube Feed Duration (in Hours): 24  Tube Feed Start Time: 21:50  No Carb Prosource (1pkg = 15gms Protein)     Qty per Day:  tid (20 @ 21:41)    Intake: Per flow records pt receiving TF at 50ml/tcj96bwj; 1800kcal, 77g pro. Propofol @ 25.8xgd21anm (673kcal). Prosource TID (180kcal, 45g pro). With propofol at 25.5/TF@50/prosource: 2653kcal/d, 122g pro. Calories/protein in excess of needs. Please see recs below. D/w NP. +1 generalized edema noted. Unsure about delivery of prosource.     Daily Weight in k (01 May 2020 08:00)  Weight in k.7 (2020 08:00)    % Weight Change    Pertinent Medications: MEDICATIONS  (STANDING):  albumin human 25% IVPB 100 milliLiter(s) IV Intermittent once  cefepime   IVPB 2000 milliGRAM(s) IV Intermittent every 8 hours  chlorhexidine 0.12% Liquid 15 milliLiter(s) Oral Mucosa every 12 hours  chlorhexidine 2% Cloths 1 Application(s) Topical <User Schedule>  enoxaparin Injectable 70 milliGRAM(s) SubCutaneous every 12 hours  fentaNYL   Infusion. 1 MICROgram(s)/kG/Hr (7.08 mL/Hr) IV Continuous <Continuous>  furosemide   Injectable 40 milliGRAM(s) IV Push once  lactulose Syrup 10 Gram(s) Oral every 12 hours  midazolam Infusion 0.02 mG/kG/Hr (1.42 mL/Hr) IV Continuous <Continuous>  mupirocin 2% Nasal 1 Application(s) Nasal two times a day  norepinephrine Infusion 0.05 MICROgram(s)/kG/Min (3.32 mL/Hr) IV Continuous <Continuous>  pantoprazole  Injectable 40 milliGRAM(s) IV Push daily  potassium chloride   Powder 40 milliEquivalent(s) Oral two times a day  propofol Infusion 10 MICROgram(s)/kG/Min (4.25 mL/Hr) IV Continuous <Continuous>  senna 1 Tablet(s) Oral two times a day  vancomycin  IVPB 1000 milliGRAM(s) IV Intermittent every 12 hours    MEDICATIONS  (PRN):  acetaminophen    Suspension .. 650 milliGRAM(s) Oral every 6 hours PRN Temp greater or equal to 38C (100.4F)  sodium chloride 0.9% lock flush 10 milliLiter(s) IV Push every 1 hour PRN Pre/post blood products, medications, blood draw, and to maintain line patency    Pertinent Labs: - Na142 mmol/L Glu 121 mg/dL<H> K+ 3.1 mmol/L<L> Cr  0.56 mg/dL BUN 12 mg/dL 05-06 Phos 1.9 mg/dL<L> 05-06 Alb 1.6 g/dL<L> 04-16 Chol 128 mg/dL LDL 78 mg/dL HDL 24 mg/dL<L> Trig 130 mg/dL     CAPILLARY BLOOD GLUCOSE      POCT Blood Glucose.: 125 mg/dL (05 May 2020 11:19)    Skin: DTI to right heel fifth toe    Estimated Needs:   [X ] no change since previous assessment  15-20kcal 1062-1416kcal/d, 1-1.2g/kg protein 70.8-85g/d  [ ] recalculated:     Previous Nutrition Diagnosis:   [ ] Inadequate Energy Intake [ ]Inadequate Oral Intake [ ] Excessive Energy Intake   [ ] Underweight [ ] Increased Nutrient Needs [ ] Overweight/Obesity   [ ] Altered GI Function [ ] Unintended Weight Loss [ ] Food & Nutrition Related Knowledge Deficit [ X] Malnutrition     Nutrition Diagnosis is [X ] ongoing  [ ] resolved [ ] not applicable     New Nutrition Diagnosis: [ ] not applicable       Interventions:   Recommend  [ ] Change Diet To:  [ ] Nutrition Supplement  [ X] Nutrition Support Rec Jevity 1.5 @98pzr11 hrs to provide 540kcal, 23g pro, 274ml free water. Prosource 1pkt TID (180kcal, 45g pro). With propofol at 25.5/TF@15/prosource TID total kcal/pro: 1393kcal, 68g pro.   [X ] Other: provision of prosource? Triglyceride monitoring on propofol.     Monitoring and Evaluation:   [ ] PO intake [ x ] Tolerance to diet prescription [ x ] weights [ x ] labs[ x ] follow up per protocol  [ ] other:

## 2020-05-06 NOTE — CHART NOTE - NSCHARTNOTEFT_GEN_A_CORE
Pt vanc trough 44.6, Vancomycin held, a.m level ordered, please redose as able. Procal noted to be 198, blood cultures x2 ordered, UA stat. Pending repeat BMP, will supplement electrolytes prn.

## 2020-05-06 NOTE — PROGRESS NOTE ADULT - SUBJECTIVE AND OBJECTIVE BOX
INTERVAL /OVERNIGHT EVENTS: 49 year old male, intubated, sedated     PRESSORS: [x ] YES [ ] NO  WHICH: levo    ANTIBIOTICS:                  DATE STARTED:  ANTIBIOTICS:                  DATE STARTED:  ANTIBIOTICS:                  DATE STARTED:    Antimicrobial:  cefepime   IVPB 2000 milliGRAM(s) IV Intermittent every 8 hours  vancomycin  IVPB 1000 milliGRAM(s) IV Intermittent every 12 hours    Cardiovascular:  norepinephrine Infusion 0.05 MICROgram(s)/kG/Min IV Continuous <Continuous>    Pulmonary:    Hematalogic:  enoxaparin Injectable 70 milliGRAM(s) SubCutaneous every 12 hours    Other:  acetaminophen    Suspension .. 650 milliGRAM(s) Oral every 6 hours PRN  chlorhexidine 0.12% Liquid 15 milliLiter(s) Oral Mucosa every 12 hours  chlorhexidine 2% Cloths 1 Application(s) Topical <User Schedule>  fentaNYL   Infusion. 1 MICROgram(s)/kG/Hr IV Continuous <Continuous>  lactulose Syrup 10 Gram(s) Oral every 12 hours  midazolam Infusion 0.02 mG/kG/Hr IV Continuous <Continuous>  mupirocin 2% Nasal 1 Application(s) Nasal two times a day  pantoprazole  Injectable 40 milliGRAM(s) IV Push daily  potassium chloride   Powder 40 milliEquivalent(s) Oral two times a day  potassium phosphate IVPB 30 milliMole(s) IV Intermittent once  propofol Infusion 10 MICROgram(s)/kG/Min IV Continuous <Continuous>  senna 1 Tablet(s) Oral two times a day  sodium chloride 0.9% lock flush 10 milliLiter(s) IV Push every 1 hour PRN    acetaminophen    Suspension .. 650 milliGRAM(s) Oral every 6 hours PRN  cefepime   IVPB 2000 milliGRAM(s) IV Intermittent every 8 hours  chlorhexidine 0.12% Liquid 15 milliLiter(s) Oral Mucosa every 12 hours  chlorhexidine 2% Cloths 1 Application(s) Topical <User Schedule>  enoxaparin Injectable 70 milliGRAM(s) SubCutaneous every 12 hours  fentaNYL   Infusion. 1 MICROgram(s)/kG/Hr IV Continuous <Continuous>  lactulose Syrup 10 Gram(s) Oral every 12 hours  midazolam Infusion 0.02 mG/kG/Hr IV Continuous <Continuous>  mupirocin 2% Nasal 1 Application(s) Nasal two times a day  norepinephrine Infusion 0.05 MICROgram(s)/kG/Min IV Continuous <Continuous>  pantoprazole  Injectable 40 milliGRAM(s) IV Push daily  potassium chloride   Powder 40 milliEquivalent(s) Oral two times a day  potassium phosphate IVPB 30 milliMole(s) IV Intermittent once  propofol Infusion 10 MICROgram(s)/kG/Min IV Continuous <Continuous>  senna 1 Tablet(s) Oral two times a day  sodium chloride 0.9% lock flush 10 milliLiter(s) IV Push every 1 hour PRN  vancomycin  IVPB 1000 milliGRAM(s) IV Intermittent every 12 hours    Drug Dosing Weight  Height (cm): 157.48 (15 Apr 2020 13:15)  Weight (kg): 70.8 (15 Apr 2020 13:15)  BMI (kg/m2): 28.5 (15 Apr 2020 13:15)  BSA (m2): 1.72 (15 Apr 2020 13:15)    CENTRAL LINE: [x ] YES [ ] NO  LOCATION:   DATE INSERTED:  REMOVE: [ ] YES [ ] NO  EXPLAIN:    DIGGS: [x ] YES [ ] NO    DATE INSERTED:  REMOVE:  [ ] YES [ ] NO  EXPLAIN:    A-LINE:  [x ] YES [ ] NO  LOCATION:   DATE INSERTED:  REMOVE:  [ ] YES [ ] NO  EXPLAIN:    ICU Vital Signs Last 24 Hrs  T(C): 36.7 (06 May 2020 08:00), Max: 37.8 (05 May 2020 20:00)  T(F): 98.1 (06 May 2020 08:00), Max: 100 (05 May 2020 20:00)  HR: 125 (06 May 2020 10:00) (95 - 125)  BP: 130/59 (06 May 2020 07:30) (130/59 - 130/59)  BP(mean): 72 (06 May 2020 07:30) (72 - 72)  ABP: 134/62 (06 May 2020 10:00) (88/50 - 141/62)  ABP(mean): 82 (06 May 2020 10:00) (82 - 89)  RR: 31 (06 May 2020 08:00) (31 - 44)  SpO2: 93% (06 May 2020 08:00) (92% - 96%)      ABG - ( 06 May 2020 04:25 )  pH, Arterial: 7.36  pH, Blood: x     /  pCO2: 60    /  pO2: 56    / HCO3: 33    / Base Excess: 7.0   /  SaO2: 88          05-05 @ 07:01  -  05-06 @ 07:00  --------------------------------------------------------  IN: 3580.2 mL / OUT: 2060 mL / NET: 1520.2 mL        Mode: AC/ CMV (Assist Control/ Continuous Mandatory Ventilation)  RR (machine): 30  TV (machine): 420  FiO2: 80  PEEP: 10  ITime: 1  MAP: 15  PIP: 32      PHYSICAL EXAM:    GENERAL:   HEAD: atraumatic, normocephalic   ENMT: nasal mucosa- Oral cavity- dry  SKIN: warm, dry   CHEST/LUNG: ET tube in place  HEART: RRR, no m/r/g   ABDOMEN: soft, nontender, nondistended; bowel sounds.  : diggs catheter.  EXTREMITIES: +1 non-pitting edema, no cyanosis, no clubbing.  NEURO: sedated     LABS:  CBC Full  -  ( 06 May 2020 05:44 )  WBC Count : 20.45 K/uL  RBC Count : 2.91 M/uL  Hemoglobin : 8.6 g/dL  Hematocrit : 27.8 %  Platelet Count - Automated : 283 K/uL  Mean Cell Volume : 95.5 fl  Mean Cell Hemoglobin : 29.6 pg  Mean Cell Hemoglobin Concentration : 30.9 gm/dL  Auto Neutrophil # : 17.84 K/uL  Auto Lymphocyte # : 0.98 K/uL  Auto Monocyte # : 0.38 K/uL  Auto Eosinophil # : 0.86 K/uL  Auto Basophil # : 0.07 K/uL  Auto Neutrophil % : 87.2 %  Auto Lymphocyte % : 4.8 %  Auto Monocyte % : 1.9 %  Auto Eosinophil % : 4.2 %  Auto Basophil % : 0.3 %    05-06    142  |  104  |  12  ----------------------------<  121<H>  3.1<L>   |  34<H>  |  0.56    Ca    7.5<L>      06 May 2020 05:44  Phos  1.9     05-06  Mg     2.5     05-06    TPro  5.7<L>  /  Alb  1.6<L>  /  TBili  1.2  /  DBili  x   /  AST  27  /  ALT  30  /  AlkPhos  154<H>  05-06    PT/INR - ( 04 May 2020 20:45 )   PT: 14.1 sec;   INR: 1.24 ratio         PTT - ( 04 May 2020 20:45 )  PTT:39.7 sec    Culture Results:   No growth to date. (05-03 @ 12:32)  Culture Results:   No growth to date. (05-03 @ 12:32)      RADIOLOGY & ADDITIONAL STUDIES REVIEWED:       [ ]GOALS OF CARE DISCUSSION WITH PATIENT/FAMILY/PROXY:    CRITICAL CARE TIME SPENT: 35 minutes

## 2020-05-06 NOTE — PROGRESS NOTE ADULT - ASSESSMENT
Neuro  - sedated on propofol, versed, fentanyl.  Will try to wean off versed.      Cardiovascular  - keep map >65  - On levophed       Pulmonary  - Acute hypoxic respiratory failure secondary to covid  - s/p pigtail insertion for pneumo 4/29, +peripheral pneumothorax along the lung apex which improved when suction increased to 40.  Xray overnight night shows right pneumo.  Will do serial xrays and monitor.    ABG - ( 06 May 2020 04:25 )  pH, Arterial: 7.36  pH, Blood: x     /  pCO2: 60    /  pO2: 56    / HCO3: 33    / Base Excess: 7.0   /  SaO2: 88   - Vent: 30/420/85/10      Infections  - COVID-19 viral pneumonia. Completed Plaquenil, Solumedrol and Tocilizumab.    - monitor crp, ferritin,   -procalcitonin 198  - Pt on cefepime   - IV tylenol q6 prn fever    Nephro  - No issues 2.1L out but pt still 1L position,   - lasix x1  - hypokalemia and hypophosphatemia - will replace    Gastrointestinal  -  TF    ID-  Leukocytosis worsening    follow up vanco through    follow up blood cultures   continue cefepime and vanco, - VT today    Heme  - Elevated D-dimer > 1000, continue full dose anticoagulation.     Endocrine  - Monitor finger sticks between 120-180.     Prophylaxis   - Full dose anticoagulation for elevated D-dimer.

## 2020-05-06 NOTE — PROGRESS NOTE ADULT - ATTENDING COMMENTS
mildly worsening right apical pna with pigtail in place.  pigtail functional- repeat xray stable no change, mildly improve? 49 yr old male COVID +. right pigtail in place.     mildly worsening right apical pna with pigtail in place.  pigtail functional- repeat xray stable no change, mildly improve? net positive 1.5l. elevated wbc.    -continue with vent  -sedate  -levo  -gi/ppi ppx    continue with current treatment.

## 2020-05-07 NOTE — PROGRESS NOTE ADULT - SUBJECTIVE AND OBJECTIVE BOX
HPI:  49 years old Male patient from home, who is known COVID positive from 04/10/2019 w/ no significant PMHx or PSHx presents to the ED with fever and headache. Patient reports he has been sick for the past x2 weeks with SOB. Patient adds he is COVID-19 positive and endorses generalized weakness. Patient came to ED 5 days ago with chest pain and shortness of breath and was tested for COVID 19. Patient was saturating fine and was discharged from ED. Patient denies any other complaints.  Patient denies any chest pain, palpitations, abdominal pain, nausea, vomiting abdominal pain , change in urinary or bowel habits.  Patient is admitted with acute respiratory failure secondary to COVID-19 infection requiring 3L NC saturating 96 percent. Will monitor respiratory status. COVID 19 here today is negative. (15 Apr 2020 18:47)    SURGERY: Chest tube insertion: right pigtail    EVENTS: elevated plateau P         ICU Vital Signs Last 24 Hrs  T(C): 36.7 (07 May 2020 07:00), Max: 37.4 (06 May 2020 17:00)  T(F): 98 (07 May 2020 07:00), Max: 99.3 (06 May 2020 17:00)  HR: 113 (07 May 2020 09:00) (87 - 130)  BP: 130/62 (07 May 2020 07:00) (104/56 - 130/62)  BP(mean): 76 (07 May 2020 07:00) (65 - 76)  ABP: 136/67 (07 May 2020 09:00) (90/50 - 139/66)  ABP(mean): 80 (07 May 2020 09:00) (62 - 90)  RR: 34 (07 May 2020 08:00) (30 - 35)  SpO2: 95% (07 May 2020 08:52) (91% - 99%)     05-06 @ 07:01  -  05-07 @ 07:00  --------------------------------------------------------  IN: 2549.4 mL / OUT: 3370 mL / NET: -820.6 mL                             8.4    21.02 )-----------( 403      ( 07 May 2020 06:31 )             27.1    05-07    141  |  100  |  14  ----------------------------<  95  3.7   |  39<H>  |  0.61    Ca    7.7<L>      07 May 2020 06:32  Phos  1.8       Mg     2.3         TPro  5.9<L>  /  Alb  1.8<L>  /  TBili  1.5<H>  /  DBili  x   /  AST  24  /  ALT  24  /  AlkPhos  147<H>     ABG - ( 07 May 2020 04:22 )  pH, Arterial: 7.38  pH, Blood: x     /  pCO2: 57    /  pO2: 74    / HCO3: 33    / Base Excess: 6.8   /  SaO2: 95                       PHYSICAL EXAM:    limited, sedated, covid +       MEDICATIONS:  Antibiotics:   cefepime   IVPB 2000 milliGRAM(s) IV Intermittent every 8 hours     Neurological:   acetaminophen    Suspension .. 650 milliGRAM(s) Oral every 6 hours PRN  fentaNYL   Infusion. 1 MICROgram(s)/kG/Hr IV Continuous <Continuous>  midazolam Infusion 0.02 mG/kG/Hr IV Continuous <Continuous>  propofol Infusion 10 MICROgram(s)/kG/Min IV Continuous <Continuous>    Cardiac:   furosemide   Injectable 20 milliGRAM(s) IV Push once  norepinephrine Infusion 0.05 MICROgram(s)/kG/Min IV Continuous <Continuous>    Pulm:    Heme:   enoxaparin Injectable 70 milliGRAM(s) SubCutaneous every 12 hours    Other:   chlorhexidine 0.12% Liquid 15 milliLiter(s) Oral Mucosa every 12 hours  chlorhexidine 2% Cloths 1 Application(s) Topical <User Schedule>  lactulose Syrup 10 Gram(s) Oral every 12 hours  pantoprazole  Injectable 40 milliGRAM(s) IV Push daily  potassium phosphate IVPB 30 milliMole(s) IV Intermittent once  senna 1 Tablet(s) Oral two times a day  sodium chloride 0.9% lock flush 10 milliLiter(s) IV Push every 1 hour PRN Pre/post blood products, medications, blood draw, and to maintain line patency  vasopressin Infusion 0.04 Unit(s)/Min IV Continuous <Continuous>       DEVICES: [] Restraints [] YARY/HMV []LD [] ET tube [] Trach [] Chest Tube [] A-line [] Bowie [] NGT [] Rectal Tube   Mode: AC/ CMV (Assist Control/ Continuous Mandatory Ventilation)  RR (machine): 32  TV (machine): 420  FiO2: 85  PEEP: 10  ITime: 1  MAP: 18  PIP: 39            assessment and Plan:   · Assessment	  Neuro  - sedated on propofol, versed, fentanyl.  Will try to wean off versed.      Cardiovascular  - keep map >65  - On levophed       Pulmonary  - Acute hypoxic respiratory failure secondary to covid  - s/p pigtail insertion for pneumo , +peripheral pneumothorax along the lung apex which improved when suction increased to 40.  Xray overnight night shows right pneumo.  Will do serial xrays and monitor.    - Vent: 30/420/85/10  Blood Gas Profile - Arterial (20 @ 04:22)    pH, Arterial: 7.38    pCO2, Arterial: 57 mmHg    pO2, Arterial: 74 mmHg    HCO3, Arterial: 33 mmol/L    Base Excess, Arterial: 6.8 mmol/L    Oxygen Saturation, Arterial: 95 %    FIO2, Arterial: 100    Blood Gas Comments Arterial: 33/410/100%/+10    MARCO  decrease PEEP to 8, FIO2 90 given worsening pneumothorax   plateaaP 34,  TV tv 410, rate increased to 33, decrease PEEP to 8       Infections  - COVID-19 viral pneumonia. Completed Plaquenil, Solumedrol and Tocilizumab.    - monitor crp, ferritin,   repeat procal   - IV tylenol q6 prn fever  cefepime day 10, d/c cefepime  vancomycin day 10 d/c vancomycin   if spike panculture     Nephro  - lasix x1  - creat stable     Gastrointestinal  -  TF  -pantoprazole     Heme  - Elevated D-dimer > 1000, continue full dose anticoagulation. lovenox     Endocrine  - Monitor finger sticks between 120-180.     Prophylaxis   - Full dose anticoagulation for elevated D-dimer.

## 2020-05-07 NOTE — PROGRESS NOTE ADULT - ASSESSMENT
Septic Shock  COVID -19 infection  Pneumonia/ARDS  Fevers - resolved  Leukocytosis - in same range      Plan - completed  abx course  cont pressor support  Prognosis is grave and mortality is expected.  Time spent - 30 mins.

## 2020-05-07 NOTE — PROGRESS NOTE ADULT - SUBJECTIVE AND OBJECTIVE BOX
49y Male    Meds:    Allergies    No Known Allergies    Intolerances        VITALS:  Vital Signs Last 24 Hrs  T(C): 36.6 (07 May 2020 16:00), Max: 37.3 (07 May 2020 00:00)  T(F): 97.9 (07 May 2020 16:00), Max: 99.1 (07 May 2020 00:00)  HR: 106 (07 May 2020 16:00) (87 - 122)  BP: 82/51 (07 May 2020 15:00) (82/51 - 130/62)  BP(mean): 58 (07 May 2020 15:00) (58 - 76)  RR: 34 (07 May 2020 12:00) (32 - 35)  SpO2: 99% (07 May 2020 15:17) (91% - 99%)    LABS/DIAGNOSTIC TESTS:                          8.4    21.02 )-----------( 403      ( 07 May 2020 06:31 )             27.1         05-07    141  |  100  |  14  ----------------------------<  95  3.7   |  39<H>  |  0.61    Ca    7.7<L>      07 May 2020 06:32  Phos  1.8     05-07  Mg     2.3     05-07    TPro  5.9<L>  /  Alb  1.8<L>  /  TBili  1.5<H>  /  DBili  x   /  AST  24  /  ALT  24  /  AlkPhos  147<H>  05-07      LIVER FUNCTIONS - ( 07 May 2020 06:32 )  Alb: 1.8 g/dL / Pro: 5.9 g/dL / ALK PHOS: 147 U/L / ALT: 24 U/L DA / AST: 24 U/L / GGT: x             CULTURES: .Blood Blood-Peripheral  05-03 @ 12:32   No growth to date.  --  --      .Blood Blood  04-23 @ 09:58   No Growth Final  --  --            RADIOLOGY:< from: Xray Chest 1 View- PORTABLE-Routine (05.07.20 @ 10:51) >  EXAM:  XR CHEST PORTABLE ROUTINE 1V                            PROCEDURE DATE:  05/07/2020          INTERPRETATION:  CLINICAL INDICATION: 49 years  Male with COVID INTUBATED.    COMPARISON: 5/6/2020    The tip of the ET tube is at the clavicular heads 7 cm above the lety. The right IJ catheter tip overlies the superior vena cava. The enteric tube extends below the diaphragm. The tip is not included in the image. The right pigtail catheter thoracostomy is unchanged.    AP view of the chest demonstrates bilateral airspace infiltrates unchanged.  A small right apical lateral pneumothorax is unchanged. There may be a small pneumomediastinum. There is no pleural  effusion.    The heart is normal in size. There is no mediastinal or hilar mass.    The pulmonary vasculature is normal.    Mild thoracic degenerative changes are present.    IMPRESSION:    Small right pneumothorax unchanged. Possible small pneumomediastinum. Bilateral airspace infiltrates unchanged.    Tubes and catheters as above.          DISCRETE X-RAY DATA:  Percent of LEFT lung opacification: %  Percent of RIGHT lung opacification: %  Change in lung opacification from most recent x-ray (<=3 days): Stable  Change from prior dated 3 or more days (same admission): Stable                ZAIN MONET M.D., ATTENDING RADIOLOGIST  This document has been electronically signed. May  7 2020  9:28AM              < end of copied text >        ROS:  [  ] UNABLE TO ELICIT ICU VISIT  49y Male who remains in the ICU , he is sedated, intubated and vent dependent, he is on 2 pressors also. He is breathing very erratically despite being on the vent. He is hypotensive at times despite being on 2 pressors. He is on no abxs currently as he finished his course. He has no fevers.     Meds:    Allergies    No Known Allergies    Intolerances        VITALS:  Vital Signs Last 24 Hrs  T(C): 36.6 (07 May 2020 16:00), Max: 37.3 (07 May 2020 00:00)  T(F): 97.9 (07 May 2020 16:00), Max: 99.1 (07 May 2020 00:00)  HR: 106 (07 May 2020 16:00) (87 - 122)  BP: 82/51 (07 May 2020 15:00) (82/51 - 130/62)  BP(mean): 58 (07 May 2020 15:00) (58 - 76)  RR: 34 (07 May 2020 12:00) (32 - 35)  SpO2: 99% (07 May 2020 15:17) (91% - 99%)    LABS/DIAGNOSTIC TESTS:                          8.4    21.02 )-----------( 403      ( 07 May 2020 06:31 )             27.1         05-07    141  |  100  |  14  ----------------------------<  95  3.7   |  39<H>  |  0.61    Ca    7.7<L>      07 May 2020 06:32  Phos  1.8     05-07  Mg     2.3     05-07    TPro  5.9<L>  /  Alb  1.8<L>  /  TBili  1.5<H>  /  DBili  x   /  AST  24  /  ALT  24  /  AlkPhos  147<H>  05-07      LIVER FUNCTIONS - ( 07 May 2020 06:32 )  Alb: 1.8 g/dL / Pro: 5.9 g/dL / ALK PHOS: 147 U/L / ALT: 24 U/L DA / AST: 24 U/L / GGT: x             CULTURES: .Blood Blood-Peripheral  05-03 @ 12:32   No growth to date.  --  --      .Blood Blood  04-23 @ 09:58   No Growth Final  --  --            RADIOLOGY:< from: Xray Chest 1 View- PORTABLE-Routine (05.07.20 @ 10:51) >  EXAM:  XR CHEST PORTABLE ROUTINE 1V                            PROCEDURE DATE:  05/07/2020          INTERPRETATION:  CLINICAL INDICATION: 49 years  Male with COVID INTUBATED.    COMPARISON: 5/6/2020    The tip of the ET tube is at the clavicular heads 7 cm above the lety. The right IJ catheter tip overlies the superior vena cava. The enteric tube extends below the diaphragm. The tip is not included in the image. The right pigtail catheter thoracostomy is unchanged.    AP view of the chest demonstrates bilateral airspace infiltrates unchanged.  A small right apical lateral pneumothorax is unchanged. There may be a small pneumomediastinum. There is no pleural  effusion.    The heart is normal in size. There is no mediastinal or hilar mass.    The pulmonary vasculature is normal.    Mild thoracic degenerative changes are present.    IMPRESSION:    Small right pneumothorax unchanged. Possible small pneumomediastinum. Bilateral airspace infiltrates unchanged.    Tubes and catheters as above.          DISCRETE X-RAY DATA:  Percent of LEFT lung opacification: %  Percent of RIGHT lung opacification: %  Change in lung opacification from most recent x-ray (<=3 days): Stable  Change from prior dated 3 or more days (same admission): Stable                ZAIN MONET M.D., ATTENDING RADIOLOGIST  This document has been electronically signed. May  7 2020  9:28AM              < end of copied text >        ROS:  [ x ] UNABLE TO ELICIT

## 2020-05-08 NOTE — PROGRESS NOTE ADULT - ASSESSMENT
Septic Shock  Bacteremia - with Enterococcus, ? real or contaminant  COVID -19 infection  Pneumonia/ARDS  Fevers - resolved  Leukocytosis - in same range      Plan -   Started on Vancomycin 1gm iv q12hrs  cont pressor support  Prognosis is grave and mortality is expected.  Time spent - 30 mins.

## 2020-05-08 NOTE — PROGRESS NOTE ADULT - ASSESSMENT
Neuro  - sedated on propofol, versed, fentanyl.      Cardiovascular  - keep map >65  - On levophed       Pulmonary  - Acute hypoxic respiratory failure secondary to covid  - s/p pigtail insertion for pneumo 4/29, monitor xrays   - Vent: 34/450/100/8  - follow abgs      Infections  - COVID-19 viral pneumonia. Completed Plaquenil, Solumedrol and Tocilizumab.    - monitor crp, ferritin,   - + bacteremic -+ gram + cocci - vanco started   - IV tylenol q6 prn fever    Nephro  - creat stable  - monitor I&O    Gastrointestinal  -  TF  -pantoprazole     Heme  - Elevated D-dimer > 1000, continue full dose anticoagulation. lovenox     Endocrine  - Monitor finger sticks between 120-180.     Prophylaxis   - Full dose anticoagulation for elevated D-dimer.     GOC:  DNR   Prognosis poor- pt bacteremic, requiring high O2 requirements - 100%, and on max sedation

## 2020-05-08 NOTE — PROGRESS NOTE ADULT - SUBJECTIVE AND OBJECTIVE BOX
49y Male    Meds:  vancomycin  IVPB 1000 milliGRAM(s) IV Intermittent every 12 hours    Allergies    No Known Allergies    Intolerances        VITALS:  Vital Signs Last 24 Hrs  T(C): 35.6 (08 May 2020 17:00), Max: 37.2 (07 May 2020 18:00)  T(F): 96 (08 May 2020 17:00), Max: 99 (07 May 2020 18:00)  HR: 104 (08 May 2020 17:00) (78 - 125)  BP: 110/65 (08 May 2020 12:00) (95/52 - 135/66)  BP(mean): 76 (08 May 2020 12:00) (60 - 90)  RR: 34 (08 May 2020 17:00) (32 - 42)  SpO2: 92% (08 May 2020 17:00) (87% - 98%)    LABS/DIAGNOSTIC TESTS:                          8.5    20.22 )-----------( 574      ( 08 May 2020 08:55 )             27.6         05-08    141  |  99  |  17  ----------------------------<  102<H>  3.4<L>   |  39<H>  |  0.65    Ca    7.9<L>      08 May 2020 08:54  Phos  3.0     05-08  Mg     2.4     05-08    TPro  6.1  /  Alb  1.7<L>  /  TBili  1.4<H>  /  DBili  x   /  AST  23  /  ALT  22  /  AlkPhos  168<H>  05-08      LIVER FUNCTIONS - ( 08 May 2020 08:54 )  Alb: 1.7 g/dL / Pro: 6.1 g/dL / ALK PHOS: 168 U/L / ALT: 22 U/L DA / AST: 23 U/L / GGT: x             CULTURES: .Blood Blood  05-07 @ 02:55   Growth in anaerobic bottle: Gram Positive Cocci in Pairs and Chains      .Blood Blood-Peripheral  05-03 @ 12:32   No Growth Final  --  --      .Blood Blood  04-23 @ 09:58   No Growth Final  --  --            RADIOLOGY:< from: Xray Chest 1 View- PORTABLE-Routine (05.08.20 @ 11:22) >  EXAM:  XR CHEST PORTABLE ROUTINE 1V                            PROCEDURE DATE:  05/08/2020          INTERPRETATION:    Examination: XR CHEST    History: ADMDIAG1: J18.9 PNEUMONIA, UNSPECIFIED ORGANISM/, + covid    Portable chest x-ray is compared to a previous examination dated 5/8/2027 at 0707 hours.    Impression: Stable ET tube, NG tube and right IJ central venous catheter.    Stable right chest tube. Stable mild right apical pneumothorax.    No evidence for pleural effusion.    Airspace opacifications in both lungs, grossly unchanged.    The trachea is midline.    Stable cardiac silhouette.      DISCRETE X-RAY DATA:  Percent of LEFT lung opacification: %  Percent of RIGHT lung opacification: %  Change in lung opacification from most recent x-ray (<=3 days): Stable  Change from prior dated 3 or more days (same admission): Increase        < end of copied text >        ROS:  [  ] UNABLE TO ELICIT ICU VISIT  49y Male who remains in the ICU, intubated, sedated and vent dependent, he is on a pressor and his BP is low and so his pressor dose was increased, he is overbreathing the vent rate, is on 100% FIO2. He has no fevers , his wbc count remains high and he is now growing out Enterococcus in his blood cultures today (1 of 2 bottles).    Meds:  vancomycin  IVPB 1000 milliGRAM(s) IV Intermittent every 12 hours    Allergies    No Known Allergies    Intolerances        VITALS:  Vital Signs Last 24 Hrs  T(C): 35.6 (08 May 2020 17:00), Max: 37.2 (07 May 2020 18:00)  T(F): 96 (08 May 2020 17:00), Max: 99 (07 May 2020 18:00)  HR: 104 (08 May 2020 17:00) (78 - 125)  BP: 110/65 (08 May 2020 12:00) (95/52 - 135/66)  BP(mean): 76 (08 May 2020 12:00) (60 - 90)  RR: 34 (08 May 2020 17:00) (32 - 42)  SpO2: 92% (08 May 2020 17:00) (87% - 98%)    LABS/DIAGNOSTIC TESTS:                          8.5    20.22 )-----------( 574      ( 08 May 2020 08:55 )             27.6         05-08    141  |  99  |  17  ----------------------------<  102<H>  3.4<L>   |  39<H>  |  0.65    Ca    7.9<L>      08 May 2020 08:54  Phos  3.0     05-08  Mg     2.4     05-08    TPro  6.1  /  Alb  1.7<L>  /  TBili  1.4<H>  /  DBili  x   /  AST  23  /  ALT  22  /  AlkPhos  168<H>  05-08      LIVER FUNCTIONS - ( 08 May 2020 08:54 )  Alb: 1.7 g/dL / Pro: 6.1 g/dL / ALK PHOS: 168 U/L / ALT: 22 U/L DA / AST: 23 U/L / GGT: x             CULTURES: .Blood Blood  05-07 @ 02:55   Growth in anaerobic bottle: Gram Positive Cocci in Pairs and Chains      .Blood Blood-Peripheral  05-03 @ 12:32   No Growth Final  --  --      .Blood Blood  04-23 @ 09:58   No Growth Final  --  --            RADIOLOGY:< from: Xray Chest 1 View- PORTABLE-Routine (05.08.20 @ 11:22) >  EXAM:  XR CHEST PORTABLE ROUTINE 1V                            PROCEDURE DATE:  05/08/2020          INTERPRETATION:    Examination: XR CHEST    History: ADMDIAG1: J18.9 PNEUMONIA, UNSPECIFIED ORGANISM/, + covid    Portable chest x-ray is compared to a previous examination dated 5/8/2027 at 0707 hours.    Impression: Stable ET tube, NG tube and right IJ central venous catheter.    Stable right chest tube. Stable mild right apical pneumothorax.    No evidence for pleural effusion.    Airspace opacifications in both lungs, grossly unchanged.    The trachea is midline.    Stable cardiac silhouette.      DISCRETE X-RAY DATA:  Percent of LEFT lung opacification: %  Percent of RIGHT lung opacification: %  Change in lung opacification from most recent x-ray (<=3 days): Stable  Change from prior dated 3 or more days (same admission): Increase        < end of copied text >        ROS:  [ x ] UNABLE TO ELICIT

## 2020-05-08 NOTE — PROGRESS NOTE ADULT - SUBJECTIVE AND OBJECTIVE BOX
INTERVAL /OVERNIGHT EVENTS: + hypoxic event overnight - pt on 100% FIo2, and chest tube lavaged.  Pt remains sedated and intubated.      PRESSORS: [x ] YES [ ] NO  WHICH: levo    ANTIBIOTICS:                  DATE STARTED:  ANTIBIOTICS:                  DATE STARTED:  ANTIBIOTICS:                  DATE STARTED:    Antimicrobial:  vancomycin  IVPB 1000 milliGRAM(s) IV Intermittent every 12 hours    Cardiovascular:  norepinephrine Infusion 0.05 MICROgram(s)/kG/Min IV Continuous <Continuous>    Pulmonary:    Hematalogic:  enoxaparin Injectable 70 milliGRAM(s) SubCutaneous every 12 hours    Other:  acetaminophen    Suspension .. 650 milliGRAM(s) Oral every 6 hours PRN  chlorhexidine 0.12% Liquid 15 milliLiter(s) Oral Mucosa every 12 hours  chlorhexidine 2% Cloths 1 Application(s) Topical <User Schedule>  fentaNYL   Infusion. 1 MICROgram(s)/kG/Hr IV Continuous <Continuous>  lactulose Syrup 10 Gram(s) Oral every 12 hours  midazolam Infusion 0.02 mG/kG/Hr IV Continuous <Continuous>  pantoprazole  Injectable 40 milliGRAM(s) IV Push daily  propofol Infusion 10 MICROgram(s)/kG/Min IV Continuous <Continuous>  rocuronium Infusion 8 MICROgram(s)/kG/Min IV Continuous <Continuous>  senna 1 Tablet(s) Oral two times a day  sodium chloride 0.9% lock flush 10 milliLiter(s) IV Push every 1 hour PRN  vasopressin Infusion 0.04 Unit(s)/Min IV Continuous <Continuous>    acetaminophen    Suspension .. 650 milliGRAM(s) Oral every 6 hours PRN  chlorhexidine 0.12% Liquid 15 milliLiter(s) Oral Mucosa every 12 hours  chlorhexidine 2% Cloths 1 Application(s) Topical <User Schedule>  enoxaparin Injectable 70 milliGRAM(s) SubCutaneous every 12 hours  fentaNYL   Infusion. 1 MICROgram(s)/kG/Hr IV Continuous <Continuous>  lactulose Syrup 10 Gram(s) Oral every 12 hours  midazolam Infusion 0.02 mG/kG/Hr IV Continuous <Continuous>  norepinephrine Infusion 0.05 MICROgram(s)/kG/Min IV Continuous <Continuous>  pantoprazole  Injectable 40 milliGRAM(s) IV Push daily  propofol Infusion 10 MICROgram(s)/kG/Min IV Continuous <Continuous>  rocuronium Infusion 8 MICROgram(s)/kG/Min IV Continuous <Continuous>  senna 1 Tablet(s) Oral two times a day  sodium chloride 0.9% lock flush 10 milliLiter(s) IV Push every 1 hour PRN  vancomycin  IVPB 1000 milliGRAM(s) IV Intermittent every 12 hours  vasopressin Infusion 0.04 Unit(s)/Min IV Continuous <Continuous>    Drug Dosing Weight  Height (cm): 157.48 (15 Apr 2020 13:15)  Weight (kg): 70.8 (15 Apr 2020 13:15)  BMI (kg/m2): 28.5 (15 Apr 2020 13:15)  BSA (m2): 1.72 (15 Apr 2020 13:15)    CENTRAL LINE: x[ ] YES [ ] NO  LOCATION:   DATE INSERTED:  REMOVE: [ ] YES [ ] NO  EXPLAIN:    DIGGS: [ x] YES [ ] NO    DATE INSERTED:  REMOVE:  [ ] YES [ ] NO  EXPLAIN:    A-LINE:  [x ] YES [ ] NO  LOCATION:   DATE INSERTED:  REMOVE:  [ ] YES [ ] NO  EXPLAIN:    ICU Vital Signs Last 24 Hrs  T(C): 36.3 (08 May 2020 14:00), Max: 37.2 (07 May 2020 18:00)  T(F): 97.4 (08 May 2020 14:00), Max: 99 (07 May 2020 18:00)  HR: 107 (08 May 2020 14:00) (78 - 125)  BP: 135/66 (08 May 2020 08:00) (95/52 - 135/66)  BP(mean): 85 (08 May 2020 08:00) (60 - 90)  ABP: 100/56 (08 May 2020 14:00) (85/50 - 150/70)  ABP(mean): 70 (08 May 2020 14:00) (60 - 102)  RR: 34 (08 May 2020 14:00) (32 - 42)  SpO2: 90% (08 May 2020 14:00) (87% - 98%)      ABG - ( 08 May 2020 03:47 )  pH, Arterial: 7.41  pH, Blood: x     /  pCO2: 63    /  pO2: 57    / HCO3: 39    / Base Excess: 11.7  /  SaO2: 88                    05 @ 07:01  -  08 @ 07:00  --------------------------------------------------------  IN: 1604 mL / OUT: 2320 mL / NET: -716 mL        Mode: AC/ CMV (Assist Control/ Continuous Mandatory Ventilation)  RR (machine): 34  TV (machine): 450  FiO2: 100  PEEP: 8  ITime: 1  MAP: 19  PIP: 36      PHYSICAL EXAM:    GENERAL:   HEAD: atraumatic, normocephalic   EYES: PERRLA, white sclera.   ENMT: nasal mucosa- Oral cavity-   NECK: supple, JVD/ no JVD, thyroid-   LYMPH: no palpable lymph nodes     SKIN: warm, dry   CHEST/LUNG: ET tube: size        cm at lip. No Chest deformity , no chest tenderness. bilateral breath sounds,no  adventitious sounds  HEART: RRR, no m/r/g   ABDOMEN: soft, nontender, nondistended; bowel sounds.  :diggs catheter.  EXTREMITIES: +1 non-pitting edema, no cyanosis, no clubbing.  NEURO: AA0X , mood/ affect-, no focal neuro deficits        LABS:  CBC Full  -  ( 08 May 2020 08:55 )  WBC Count : 20.22 K/uL  RBC Count : 2.86 M/uL  Hemoglobin : 8.5 g/dL  Hematocrit : 27.6 %  Platelet Count - Automated : 574 K/uL  Mean Cell Volume : 96.5 fl  Mean Cell Hemoglobin : 29.7 pg  Mean Cell Hemoglobin Concentration : 30.8 gm/dL  Auto Neutrophil # : 16.51 K/uL  Auto Lymphocyte # : 0.92 K/uL  Auto Monocyte # : 0.51 K/uL  Auto Eosinophil # : 1.39 K/uL  Auto Basophil # : 0.11 K/uL  Auto Neutrophil % : 81.7 %  Auto Lymphocyte % : 4.5 %  Auto Monocyte % : 2.5 %  Auto Eosinophil % : 6.9 %  Auto Basophil % : 0.5 %    05-08    141  |  99  |  17  ----------------------------<  102<H>  3.4<L>   |  39<H>  |  0.65    Ca    7.9<L>      08 May 2020 08:54  Phos  3.0     05-08  Mg     2.4     05-08    TPro  6.1  /  Alb  1.7<L>  /  TBili  1.4<H>  /  DBili  x   /  AST  23  /  ALT  22  /  AlkPhos  168<H>  05-08    PT/INR - ( 08 May 2020 08:54 )   PT: 12.5 sec;   INR: 1.10 ratio         PTT - ( 08 May 2020 08:54 )  PTT:31.8 sec  Urinalysis Basic - ( 06 May 2020 21:30 )    Color: Yellow / Appearance: Slightly Turbid / S.020 / pH: x  Gluc: x / Ketone: Negative  / Bili: Negative / Urobili: 1   Blood: x / Protein: 100 / Nitrite: Negative   Leuk Esterase: Negative / RBC: 0-2 /HPF / WBC 0-2 /HPF   Sq Epi: x / Non Sq Epi: Few /HPF / Bacteria: Moderate /HPF      Culture Results:   Growth in anaerobic bottle: Gram Positive Cocci in Pairs and Chains  "Due to technical problems, Proteus sp. will Not be reported as part of  the BCID panel until further notice"  ***Blood Panel PCR results on this specimen are available  approximately 3 hours after the Gram stain result.***  Gram stain, PCR, and/or culture results may not always  correspond due to difference in methodologies.  ************************************************************  This PCR assay was performed using [employer].  The following targets are tested for: Enterococcus,  vancomycin resistant enterococci, Listeria monocytogenes,  coagulase negative staphylococci, S. aureus,  methicillin resistant S. aureus, Streptococcus agalactiae  (Group B), S. pneumoniae, S. pyogenes (Group A),  Acinetobacter baumannii, Enterobacter cloacae, E. coli,  Klebsiella oxytoca, K. pneumoniae, Proteus sp.,  Serratia marcescens, Haemophilus influenzae,  Neisseria meningitidis, Pseudomonas aeruginosa, Candida  albicans, C. glabrata, C krusei, C parapsilosis,  C. tropicalis and the KPC resistance gene. ( @ 02:55)      RADIOLOGY & ADDITIONAL STUDIES REVIEWED:   < from: Xray Chest 1 View- PORTABLE-Routine (20 @ 11:22) >  EXAM:  XR CHEST PORTABLE ROUTINE 1V                            PROCEDURE DATE:  2020          INTERPRETATION:    Examination: XR CHEST    History: ADMDIAG1: J18.9 PNEUMONIA, UNSPECIFIED ORGANISM/, + covid    Portable chest x-ray is compared to a previous examination dated 2027 at 0707 hours.    Impression: Stable ET tube, NG tube and right IJ central venous catheter.    Stable right chest tube. Stable mild right apical pneumothorax.    No evidence for pleural effusion.    Airspace opacifications in both lungs, grossly unchanged.    The trachea is midline.    Stable cardiac silhouette.      DISCRETE X-RAY DATA:  Percent of LEFT lung opacification: %  Percent of RIGHT lung opacification: %  Change in lung opacification from most recent x-ray (<=3 days): Stable  Change from prior dated 3 or more days (same admission): Increase        < end of copied text >      [ ]GOALS OF CARE DISCUSSION WITH PATIENT/FAMILY/PROXY:    CRITICAL CARE TIME SPENT: 35 minutes

## 2020-05-09 NOTE — PROGRESS NOTE ADULT - ASSESSMENT
Neuro  - sedated on propofol, versed, fentanyl.      Cardiovascular  - keep map >65  - On levophed       Pulmonary  - Acute hypoxic respiratory failure secondary to covid  - s/p pigtail insertion for pneumo 4/29, monitor xrays   - Vent: 34/450/100/8  - follow abgs      Infections  - COVID-19 viral pneumonia. Completed Plaquenil, Solumedrol and Tocilizumab.    - monitor crp, ferritin,   - + bacteremic -+ gram + cocci - vanco started   - IV tylenol q6 prn fever    Nephro  - creat stable  - monitor I&O    Gastrointestinal  -  TF  -pantoprazole     Heme  - Elevated D-dimer > 1000, continue full dose anticoagulation. lovenox     Endocrine  - Monitor finger sticks between 120-180.     Prophylaxis   - Full dose anticoagulation for elevated D-dimer.     GOC:  DNR   Prognosis poor- pt bacteremic, requiring high O2 requirements - 100%, and on max sedation ARDS respiratory failure   Neuro  - sedated on propofol, versed, fentanyl and julia  for syncrony with the vent     Cardiovascular  - keep map >65  - On levophed       Pulmonary  - Acute hypoxic respiratory failure secondary to covid  - s/p pigtail insertion for pneumo 4/29, monitor xrays   xray worse today, hypoxemia on 100%FiO2, PEEP 15, plateau elevated  on PCV   Blood Gas Profile - Arterial (05.10.20 @ 10:36)    pH, Arterial: 7.20: TYPE:(C=Critical, N=Notification, A=Abnormal) C  TESTS: _PH 7.2  DATE/TIME CALLED: _05/10/20 10:38  CALLED TO: _Dr. MAR Tamayo  READ BACK (2 Patient Identifiers)(Y/N): _Y  READ BACK VALUES (Y/N): _Y  CALLED BY: _Connie Sanches  rt    pCO2, Arterial: 69 mmHg    pO2, Arterial: 57 mmHg    HCO3, Arterial: 26 mmol/L    Base Excess, Arterial: -1.8 mmol/L    Oxygen Saturation, Arterial: 79 %    FIO2, Arterial: 100    Blood Gas Comments Arterial: PCV 30/P30/100%/+15  A-line  respiratory acidosis   proned him today but hypoxia worsened, will pu him back in supine position     Infections  - COVID-19 viral pneumonia. Completed Plaquenil, Solumedrol and Tocilizumab.    - monitor crp, ferritin,   - enteroccocci in blood on  vanco will get through today     Nephro  - creat stable  -lasix 40 mg   - monitor I&O    Gastrointestinal  -  TF on hold because he is proned   -pantoprazole     Heme  - Elevated D-dimer > 1000, continue full dose anticoagulation. lovenox     Endocrine  - Monitor finger sticks between 120-180.     Prophylaxis   - Full dose anticoagulation for elevated D-dimer.     GOC:  DNR   Prognosis poor- pt bacteremic, requiring high O2 requirements - 100%, and on max sedation

## 2020-05-09 NOTE — PROGRESS NOTE ADULT - SUBJECTIVE AND OBJECTIVE BOX
INTERVAL /OVERNIGHT EVENTS:   Pt remains sedated and intubated on fio2 100%    PRESSORS: [x ] YES [ ] NO  WHICH: levo    ANTIBIOTICS:                  DATE STARTED:  ANTIBIOTICS:                  DATE STARTED:  ANTIBIOTICS:                  DATE STARTED:    Antimicrobial:  vancomycin  IVPB 1000 milliGRAM(s) IV Intermittent every 12 hours    Cardiovascular:  norepinephrine Infusion 0.05 MICROgram(s)/kG/Min IV Continuous <Continuous>    Pulmonary:    Hematalogic:  enoxaparin Injectable 70 milliGRAM(s) SubCutaneous every 12 hours    Other:  acetaminophen    Suspension .. 650 milliGRAM(s) Oral every 6 hours PRN  chlorhexidine 0.12% Liquid 15 milliLiter(s) Oral Mucosa every 12 hours  chlorhexidine 2% Cloths 1 Application(s) Topical <User Schedule>  fentaNYL   Infusion. 1 MICROgram(s)/kG/Hr IV Continuous <Continuous>  lactulose Syrup 10 Gram(s) Oral every 12 hours  midazolam Infusion 0.02 mG/kG/Hr IV Continuous <Continuous>  pantoprazole  Injectable 40 milliGRAM(s) IV Push daily  propofol Infusion 10 MICROgram(s)/kG/Min IV Continuous <Continuous>  rocuronium Infusion 8 MICROgram(s)/kG/Min IV Continuous <Continuous>  senna 1 Tablet(s) Oral two times a day  sodium chloride 0.9% lock flush 10 milliLiter(s) IV Push every 1 hour PRN  vasopressin Infusion 0.04 Unit(s)/Min IV Continuous <Continuous>    acetaminophen    Suspension .. 650 milliGRAM(s) Oral every 6 hours PRN  chlorhexidine 0.12% Liquid 15 milliLiter(s) Oral Mucosa every 12 hours  chlorhexidine 2% Cloths 1 Application(s) Topical <User Schedule>  enoxaparin Injectable 70 milliGRAM(s) SubCutaneous every 12 hours  fentaNYL   Infusion. 1 MICROgram(s)/kG/Hr IV Continuous <Continuous>  lactulose Syrup 10 Gram(s) Oral every 12 hours  midazolam Infusion 0.02 mG/kG/Hr IV Continuous <Continuous>  norepinephrine Infusion 0.05 MICROgram(s)/kG/Min IV Continuous <Continuous>  pantoprazole  Injectable 40 milliGRAM(s) IV Push daily  propofol Infusion 10 MICROgram(s)/kG/Min IV Continuous <Continuous>  rocuronium Infusion 8 MICROgram(s)/kG/Min IV Continuous <Continuous>  senna 1 Tablet(s) Oral two times a day  sodium chloride 0.9% lock flush 10 milliLiter(s) IV Push every 1 hour PRN  vancomycin  IVPB 1000 milliGRAM(s) IV Intermittent every 12 hours  vasopressin Infusion 0.04 Unit(s)/Min IV Continuous <Continuous>    Drug Dosing Weight  Height (cm): 157.48 (15 Apr 2020 13:15)  Weight (kg): 70.8 (15 Apr 2020 13:15)  BMI (kg/m2): 28.5 (15 Apr 2020 13:15)  BSA (m2): 1.72 (15 Apr 2020 13:15)    CENTRAL LINE: x[ ] YES [ ] NO  LOCATION:   DATE INSERTED:  REMOVE: [ ] YES [ ] NO  EXPLAIN:    DIGGS: [ x] YES [ ] NO    DATE INSERTED:  REMOVE:  [ ] YES [ ] NO  EXPLAIN:    A-LINE:  [x ] YES [ ] NO  LOCATION:   DATE INSERTED:  REMOVE:  [ ] YES [ ] NO  EXPLAIN:      ICU Vital Signs Last 24 Hrs  T(C): 36.4 (09 May 2020 04:00), Max: 36.9 (09 May 2020 00:00)  T(F): 97.6 (09 May 2020 04:00), Max: 98.4 (09 May 2020 00:00)  HR: 109 (09 May 2020 09:00) (88 - 120)  BP: 100/57 (09 May 2020 08:00) (86/55 - 152/83)  BP(mean): 66 (09 May 2020 08:00) (60 - 101)  ABP: 149/77 (09 May 2020 09:00) (87/52 - 182/89)  ABP(mean): 100 (09 May 2020 09:00) (62 - 122)  RR: 34 (09 May 2020 09:00) (33 - 34)  SpO2: 97% (09 May 2020 09:00) (90% - 98%)    I&O's Detail    08 May 2020 07:01  -  09 May 2020 07:00  --------------------------------------------------------  IN:    fentaNYL Infusion.: 339.6 mL    midazolam Infusion: 244.8 mL    norepinephrine Infusion: 189.1 mL    ns in tub fed  [id number]: 345 mL    propofol Infusion: 599.8 mL    rocuronium Infusion: 193.8 mL    Solution: 100 mL    Solution: 500 mL  Total IN: 2512.1 mL    OUT:    Chest Tube: 80 mL    Indwelling Catheter - Urethral: 1345 mL  Total OUT: 1425 mL    Total NET: 1087.1 mL      09 May 2020 07:01  -  09 May 2020 09:44  --------------------------------------------------------  IN:    fentaNYL Infusion.: 56.6 mL    midazolam Infusion: 21.2 mL    norepinephrine Infusion: 26.6 mL    ns in tub fed  [id number]: 30 mL    propofol Infusion: 55.2 mL    rocuronium Infusion: 17 mL  Total IN: 206.6 mL    OUT:    Indwelling Catheter - Urethral: 140 mL  Total OUT: 140 mL    Total NET: 66.6 mL    Mode: AC/ CMV (Assist Control/ Continuous Mandatory Ventilation)  RR (machine): 34  TV (machine): 450  FiO2: 100  PEEP: 8  MAP: 17  PIP: 35    ABG - ( 09 May 2020 04:46 )  pH, Arterial: 7.40  pH, Blood: x     /  pCO2: 58    /  pO2: 60    / HCO3: 35    / Base Excess: 8.3   /  SaO2: 89    PHYSICAL EXAM:    GENERAL:   HEAD: atraumatic, normocephalic   EYES: PERRLA, white sclera.   ENMT: nasal mucosa- Oral cavity-   NECK: supple, JVD/ no JVD, thyroid-   LYMPH: no palpable lymph nodes     SKIN: warm, dry   CHEST/LUNG: ET tube in place  No Chest deformity , no chest tenderness. bilateral breath sounds, no  adventitious sounds  HEART: RRR, no m/r/g   ABDOMEN: soft, nontender, nondistended; bowel sounds.  :diggs catheter.  EXTREMITIES: +1 non-pitting edema, no cyanosis, no clubbing.  NEURO: AA0X , mood/ affect-, no focal neuro deficits        LABS:                          8.2    18.83 )-----------( 715      ( 09 May 2020 06:10 )             27.7   05-09    139  |  98  |  17  ----------------------------<  87  3.9   |  35<H>  |  0.72    Ca    7.9<L>      09 May 2020 06:10  Phos  3.3     05-09  Mg     2.4     05-09    TPro  6.0  /  Alb  1.7<L>  /  TBili  1.3<H>  /  DBili  x   /  AST  25  /  ALT  19  /  AlkPhos  162<H>  05-09    Culture Results:   < from: Xray Chest 1 View- PORTABLE-Routine (05.08.20 @ 11:22) >  EXAM:  XR CHEST PORTABLE ROUTINE 1V                            PROCEDURE DATE:  05/08/2020          INTERPRETATION:    Examination: XR CHEST    History: ADMDIAG1: J18.9 PNEUMONIA, UNSPECIFIED ORGANISM/, + covid    Portable chest x-ray is compared to a previous examination dated 5/8/2027 at 0707 hours.    Impression: Stable ET tube, NG tube and right IJ central venous catheter.    Stable right chest tube. Stable mild right apical pneumothorax.    No evidence for pleural effusion.    Airspace opacifications in both lungs, grossly unchanged.    The trachea is midline.    Stable cardiac silhouette.      DISCRETE X-RAY DATA:  Percent of LEFT lung opacification: %  Percent of RIGHT lung opacification: %  Change in lung opacification from most recent x-ray (<=3 days): Stable  Change from prior dated 3 or more days (same admission): Increase                  ROCKY CARBAJAL M.D., ATTENDING RADIOLOGIST  This document has been electronically signed. May  8 2020 11:33AM        < end of copied text >  Growth in anaerobic bottle: Gram Positive Cocci in Pairs and Chains  "Due to technical problems, Proteus sp. will Not be reported as part of  the BCID panel until further notice"  ***Blood Panel PCR results on this specimen are available  approximately 3 hours after the Gram stain result.***  Gram stain, PCR, and/or culture results may not always  correspond due to difference in methodologies.  ************************************************************  This PCR assay was performed using [employer].             The following targets are tested for: Enterococcus,  vancomycin resistant enterococci, Listeria monocytogenes,  coagulase negative staphylococci, S. aureus,  methicillin resistant S. aureus, Streptococcus agalactiae  (Group B), S. pneumoniae, S. pyogenes (Group A),  Acinetobacter baumannii, Enterobacter cloacae, E. coli,  Klebsiella oxytoca, K. pneumoniae, Proteus sp.,  Serratia marcescens, Haemophilus influenzae,  Neisseria meningitidis, Pseudomonas aeruginosa, Candida  albicans, C. glabrata, C krusei, C parapsilosis,  C. tropicalis and the KPC resistance gene. (05-07 @ 02:55)      RADIOLOGY & ADDITIONAL STUDIES REVIEWED:       [ ]GOALS OF CARE DISCUSSION WITH PATIENT/FAMILY/PROXY:    CRITICAL CARE TIME SPENT: 35 minutes INTERVAL /OVERNIGHT EVENTS:   Pt remains sedated and intubated on fio2 100%      PRESSORS: [x ] YES [ ] NO  WHICH: levo    T(C): 37.3 (05-10-20 @ 07:30), Max: 37.8 (05-09-20 @ 16:00)  HR: 130 (05-10-20 @ 10:59) (96 - 130)  BP: 90/43 (05-10-20 @ 06:00) (89/53 - 125/71)  RR: 34 (05-10-20 @ 07:30) (34 - 34)  SpO2: 79% (05-10-20 @ 10:59) (79% - 96%)  05-09-20 @ 07:01  -  05-10-20 @ 07:00  --------------------------------------------------------  IN: 2444.5 mL / OUT: 1710 mL / NET: 734.5 mL    acetaminophen    Suspension .. 650 milliGRAM(s) Oral every 6 hours PRN  chlorhexidine 0.12% Liquid 15 milliLiter(s) Oral Mucosa every 12 hours  chlorhexidine 2% Cloths 1 Application(s) Topical <User Schedule>  enoxaparin Injectable 70 milliGRAM(s) SubCutaneous every 12 hours  fentaNYL   Infusion. 1 MICROgram(s)/kG/Hr IV Continuous <Continuous>  lactulose Syrup 10 Gram(s) Oral every 12 hours  norepinephrine Infusion 0.05 MICROgram(s)/kG/Min IV Continuous <Continuous>  pantoprazole  Injectable 40 milliGRAM(s) IV Push daily  propofol Infusion 10 MICROgram(s)/kG/Min IV Continuous <Continuous>  rocuronium Infusion 8.004 MICROgram(s)/kG/Min IV Continuous <Continuous>  rocuronium Injectable 50 milliGRAM(s) IV Push once  senna 1 Tablet(s) Oral two times a day  sodium chloride 0.9% lock flush 10 milliLiter(s) IV Push every 1 hour PRN  vancomycin  IVPB 1000 milliGRAM(s) IV Intermittent every 12 hours  Mode: AC/ CMV (Assist Control/ Continuous Mandatory Ventilation), RR (machine): 34, TV (machine): 450, FiO2: 100, PEEP: 15, ITime: 1, MAP: 32, PC: 30, PIP: 50  PHYSICAL EXAM:    GENERAL:   sedated paralyzed  limited exam        LABS:                          8.2    18.83 )-----------( 715      ( 09 May 2020 06:10 )             27.7   05-09    139  |  98  |  17  ----------------------------<  87  3.9   |  35<H>  |  0.72    Ca    7.9<L>      09 May 2020 06:10  Phos  3.3     05-09  Mg     2.4     05-09    TPro  6.0  /  Alb  1.7<L>  /  TBili  1.3<H>  /  DBili  x   /  AST  25  /  ALT  19  /  AlkPhos  162<H>  05-09    Culture Results:   < from: Xray Chest 1 View- PORTABLE-Routine (05.08.20 @ 11:22) >  EXAM:  XR CHEST PORTABLE ROUTINE 1V                            PROCEDURE DATE:  05/08/2020          INTERPRETATION:    Examination: XR CHEST    History: ADMDIAG1: J18.9 PNEUMONIA, UNSPECIFIED ORGANISM/, + covid    Portable chest x-ray is compared to a previous examination dated 5/8/2027 at 0707 hours.    Impression: Stable ET tube, NG tube and right IJ central venous catheter.    Stable right chest tube. Stable mild right apical pneumothorax.    No evidence for pleural effusion.    Airspace opacifications in both lungs, grossly unchanged.    The trachea is midline.    Stable cardiac silhouette.      DISCRETE X-RAY DATA:  Percent of LEFT lung opacification: %  Percent of RIGHT lung opacification: %  Change in lung opacification from most recent x-ray (<=3 days): Stable  Change from prior dated 3 or more days (same admission): Increase                  ROCKY CARBAJAL M.D., ATTENDING RADIOLOGIST  This document has been electronically signed. May  8 2020 11:33AM        < end of copied text >  Growth in anaerobic bottle: Gram Positive Cocci in Pairs and Chains  "Due to technical problems, Proteus sp. will Not be reported as part of  the BCID panel until further notice"  ***Blood Panel PCR results on this specimen are available  approximately 3 hours after the Gram stain result.***  Gram stain, PCR, and/or culture results may not always  correspond due to difference in methodologies.  ************************************************************  This PCR assay was performed using [employer].             The following targets are tested for: Enterococcus,  vancomycin resistant enterococci, Listeria monocytogenes,  coagulase negative staphylococci, S. aureus,  methicillin resistant S. aureus, Streptococcus agalactiae  (Group B), S. pneumoniae, S. pyogenes (Group A),  Acinetobacter baumannii, Enterobacter cloacae, E. coli,  Klebsiella oxytoca, K. pneumoniae, Proteus sp.,  Serratia marcescens, Haemophilus influenzae,  Neisseria meningitidis, Pseudomonas aeruginosa, Candida  albicans, C. glabrata, C krusei, C parapsilosis,  C. tropicalis and the KPC resistance gene. (05-07 @ 02:55)      RADIOLOGY & ADDITIONAL STUDIES REVIEWED:       [ ]GOALS OF CARE DISCUSSION WITH PATIENT/FAMILY/PROXY:    CRITICAL CARE TIME SPENT: 35 minutes INTERVAL /OVERNIGHT EVENTS:   hypoxemia       PRESSORS: [x ] YES [ ] NO  WHICH: levo    T(C): 37.3 (05-10-20 @ 07:30), Max: 37.8 (05-09-20 @ 16:00)  HR: 130 (05-10-20 @ 10:59) (96 - 130)  BP: 90/43 (05-10-20 @ 06:00) (89/53 - 125/71)  RR: 34 (05-10-20 @ 07:30) (34 - 34)  SpO2: 79% (05-10-20 @ 10:59) (79% - 96%)  05-09-20 @ 07:01  -  05-10-20 @ 07:00  --------------------------------------------------------  IN: 2444.5 mL / OUT: 1710 mL / NET: 734.5 mL    acetaminophen    Suspension .. 650 milliGRAM(s) Oral every 6 hours PRN  chlorhexidine 0.12% Liquid 15 milliLiter(s) Oral Mucosa every 12 hours  chlorhexidine 2% Cloths 1 Application(s) Topical <User Schedule>  enoxaparin Injectable 70 milliGRAM(s) SubCutaneous every 12 hours  fentaNYL   Infusion. 1 MICROgram(s)/kG/Hr IV Continuous <Continuous>  lactulose Syrup 10 Gram(s) Oral every 12 hours  norepinephrine Infusion 0.05 MICROgram(s)/kG/Min IV Continuous <Continuous>  pantoprazole  Injectable 40 milliGRAM(s) IV Push daily  propofol Infusion 10 MICROgram(s)/kG/Min IV Continuous <Continuous>  rocuronium Infusion 8.004 MICROgram(s)/kG/Min IV Continuous <Continuous>  rocuronium Injectable 50 milliGRAM(s) IV Push once  senna 1 Tablet(s) Oral two times a day  sodium chloride 0.9% lock flush 10 milliLiter(s) IV Push every 1 hour PRN  vancomycin  IVPB 1000 milliGRAM(s) IV Intermittent every 12 hours  Mode: AC/ CMV (Assist Control/ Continuous Mandatory Ventilation), RR (machine): 34, TV (machine): 450, FiO2: 100, PEEP: 15, ITime: 1, MAP: 32, PC: 30, PIP: 50  PHYSICAL EXAM:    GENERAL:   sedated paralyzed  limited exam        LABS:                          8.2    18.83 )-----------( 715      ( 09 May 2020 06:10 )             27.7   05-09    139  |  98  |  17  ----------------------------<  87  3.9   |  35<H>  |  0.72    Ca    7.9<L>      09 May 2020 06:10  Phos  3.3     05-09  Mg     2.4     05-09    TPro  6.0  /  Alb  1.7<L>  /  TBili  1.3<H>  /  DBili  x   /  AST  25  /  ALT  19  /  AlkPhos  162<H>  05-09    Culture Results:   < from: Xray Chest 1 View- PORTABLE-Routine (05.08.20 @ 11:22) >  EXAM:  XR CHEST PORTABLE ROUTINE 1V                            PROCEDURE DATE:  05/08/2020          INTERPRETATION:    Examination: XR CHEST    History: ADMDIAG1: J18.9 PNEUMONIA, UNSPECIFIED ORGANISM/, + covid    Portable chest x-ray is compared to a previous examination dated 5/8/2027 at 0707 hours.    Impression: Stable ET tube, NG tube and right IJ central venous catheter.    Stable right chest tube. Stable mild right apical pneumothorax.    No evidence for pleural effusion.    Airspace opacifications in both lungs, grossly unchanged.    The trachea is midline.    Stable cardiac silhouette.      DISCRETE X-RAY DATA:  Percent of LEFT lung opacification: %  Percent of RIGHT lung opacification: %  Change in lung opacification from most recent x-ray (<=3 days): Stable  Change from prior dated 3 or more days (same admission): Increase                  ROCKY CARBAJAL M.D., ATTENDING RADIOLOGIST  This document has been electronically signed. May  8 2020 11:33AM        < end of copied text >  Growth in anaerobic bottle: Gram Positive Cocci in Pairs and Chains  "Due to technical problems, Proteus sp. will Not be reported as part of  the BCID panel until further notice"  ***Blood Panel PCR results on this specimen are available  approximately 3 hours after the Gram stain result.***  Gram stain, PCR, and/or culture results may not always  correspond due to difference in methodologies.  ************************************************************  This PCR assay was performed using [employer].             The following targets are tested for: Enterococcus,  vancomycin resistant enterococci, Listeria monocytogenes,  coagulase negative staphylococci, S. aureus,  methicillin resistant S. aureus, Streptococcus agalactiae  (Group B), S. pneumoniae, S. pyogenes (Group A),  Acinetobacter baumannii, Enterobacter cloacae, E. coli,  Klebsiella oxytoca, K. pneumoniae, Proteus sp.,  Serratia marcescens, Haemophilus influenzae,  Neisseria meningitidis, Pseudomonas aeruginosa, Candida  albicans, C. glabrata, C krusei, C parapsilosis,  C. tropicalis and the KPC resistance gene. (05-07 @ 02:55)      RADIOLOGY & ADDITIONAL STUDIES REVIEWED:       [ ]GOALS OF CARE DISCUSSION WITH PATIENT/FAMILY/PROXY:    CRITICAL CARE TIME SPENT: 35 minutes

## 2020-05-10 NOTE — PROGRESS NOTE ADULT - ASSESSMENT
ARDS respiratory failure     Neuro  - sedated on propofol, versed, fentanyl and julia for syncrony with vent     Cardiovascular  - keep map >65  - On levophed     Pulmonary  - Acute hypoxic respiratory failure secondary to covid  - s/p pigtail insertion for pneumo 4/29, monitor xrays   xray worse 5/10, pneumothorax increased from yesterday, keep CT to suction  hypoxemia on 100%FiO2, PEEP 15, plateau elevated  now on PCV 30/30/100/15    Blood Gas Profile - Arterial (05.10.20 @ 10:36)    pH, Arterial: 7.20: TYPE:(C=Critical, N=Notification, A=Abnormal) C  TESTS: _PH 7.2  DATE/TIME CALLED: _05/10/20 10:38  CALLED TO: _Dr. MAR Tamayo  READ BACK (2 Patient Identifiers)(Y/N): _Y  READ BACK VALUES (Y/N): _Y  CALLED BY: _Connie Sanches  rt    pCO2, Arterial: 69 mmHg    pO2, Arterial: 57 mmHg    HCO3, Arterial: 26 mmol/L    Base Excess, Arterial: -1.8 mmol/L    Oxygen Saturation, Arterial: 79 %    FIO2, Arterial: 100    Blood Gas Comments Arterial: PCV 30/P30/100%/+15  A-line  respiratory acidosis   proned pt today but hypoxia worsened, will place back on supine    Infections  - COVID-19 viral pneumonia. Completed Plaquenil, Solumedrol and Tocilizumab.    - monitor crp, ferritin,   - enteroccocci in blood on vanco will f/u with trough today    Nephro  - Cr stable  - s/p lasix 40 mg   - monitor I&O    Gastrointestinal  - TF on hold because he is prone   - pantoprazole     Heme  - Elevated D-dimer > 1000, continue full dose anticoagulation. lovenox     Endocrine  - Monitor finger sticks between 120-180.     Prophylaxis   - Full dose anticoagulation for elevated D-dimer.     GOC:  DNR   Prognosis poor- pt bacteremic, requiring high O2 requirements - 100%, and on max sedation

## 2020-05-10 NOTE — PROGRESS NOTE ADULT - SUBJECTIVE AND OBJECTIVE BOX
INTERVAL /OVERNIGHT EVENTS:   Pt remains sedated and intubated on fio2 100%    PRESSORS: [x ] YES [ ] NO  WHICH: levo    ICU Vital Signs Last 24 Hrs  T(C): 35.7 (10 May 2020 11:50), Max: 37.8 (09 May 2020 16:00)  T(F): 96.3 (10 May 2020 11:50), Max: 100 (09 May 2020 16:00)  HR: 130 (10 May 2020 11:50) (96 - 130)  BP: 109/62 (10 May 2020 11:50) (89/53 - 125/71)  BP(mean): 77 (10 May 2020 11:50) (54 - 83)  ABP: 133/70 (10 May 2020 07:30) (90/51 - 147/77)  ABP(mean): 90 (10 May 2020 07:30) (64 - 100)  RR: 34 (10 May 2020 11:50) (34 - 34)  SpO2: 66% (10 May 2020 11:50) (66% - 96%)      MEDICATIONS:  MEDICATIONS  (STANDING):  chlorhexidine 0.12% Liquid 15 milliLiter(s) Oral Mucosa every 12 hours  chlorhexidine 2% Cloths 1 Application(s) Topical <User Schedule>  enoxaparin Injectable 70 milliGRAM(s) SubCutaneous every 12 hours  fentaNYL   Infusion. 1 MICROgram(s)/kG/Hr (7.08 mL/Hr) IV Continuous <Continuous>  lactulose Syrup 10 Gram(s) Oral every 12 hours  norepinephrine Infusion 0.05 MICROgram(s)/kG/Min (3.32 mL/Hr) IV Continuous <Continuous>  pantoprazole  Injectable 40 milliGRAM(s) IV Push daily  propofol Infusion 10 MICROgram(s)/kG/Min (4.25 mL/Hr) IV Continuous <Continuous>  rocuronium Infusion 8.004 MICROgram(s)/kG/Min (6.8 mL/Hr) IV Continuous <Continuous>  senna 1 Tablet(s) Oral two times a day  vancomycin  IVPB 1000 milliGRAM(s) IV Intermittent every 12 hours    MEDICATIONS  (PRN):  acetaminophen    Suspension .. 650 milliGRAM(s) Oral every 6 hours PRN Temp greater or equal to 38C (100.4F)  sodium chloride 0.9% lock flush 10 milliLiter(s) IV Push every 1 hour PRN Pre/post blood products, medications, blood draw, and to maintain line patency    VENTILATOR:  Mode: AC/ CMV (Assist Control/ Continuous Mandatory Ventilation), RR (machine): 34, TV (machine): 450, FiO2: 100, PEEP: 15, ITime: 1, MAP: 32, PC: 30, PIP: 50  PHYSICAL EXAM:    GENERAL:   sedated & paralyzed, prone, limited exam      LABS:                                 7.9    17.31 )-----------( 740      ( 10 May 2020 06:20 )             26.1   05-10    140  |  103  |  15  ----------------------------<  95  3.6   |  32<H>  |  0.64    Ca    8.1<L>      10 May 2020 06:20  Phos  4.1     05-10  Mg     2.5     05-10    TPro  5.8<L>  /  Alb  1.4<L>  /  TBili  1.5<H>  /  DBili  x   /  AST  25  /  ALT  15  /  AlkPhos  157<H>  05-10    Blood Cultures:  Growth in anaerobic bottle: Gram Positive Cocci in Pairs and Chains  "Due to technical problems, Proteus sp. will Not be reported as part of  the BCID panel until further notice"  ***Blood Panel PCR results on this specimen are available  approximately 3 hours after the Gram stain result.***  Gram stain, PCR, and/or culture results may not always  correspond due to difference in methodologies.  ************************************************************  This PCR assay was performed using [employer].             The following targets are tested for: Enterococcus,  vancomycin resistant enterococci, Listeria monocytogenes,  coagulase negative staphylococci, S. aureus,  methicillin resistant S. aureus, Streptococcus agalactiae  (Group B), S. pneumoniae, S. pyogenes (Group A),  Acinetobacter baumannii, Enterobacter cloacae, E. coli,  Klebsiella oxytoca, K. pneumoniae, Proteus sp.,  Serratia marcescens, Haemophilus influenzae,  Neisseria meningitidis, Pseudomonas aeruginosa, Candida  albicans, C. glabrata, C krusei, C parapsilosis,  C. tropicalis and the KPC resistance gene. (05-07 @ 02:55)      < from: Xray Chest 1 View- PORTABLE-Routine (05.10.20 @ 10:14) >  INTERPRETATION:  INDICATION: COVID PNA; ventilatory support.    PRIORS: 5/9/2020 7:11 PM  VIEWS: Portable AP radiography of the chest performed.  FINDINGS: Since prior evaluation there is no significant interval change in position of the indwelling ETT, NGT or right IJ CVC. The right-sided pigtail catheter is unchanged in position. Extensive bilateral lung parenchymal airspace opacities are unchanged. Right-sided pneumothorax is redemonstrated, previously described, possibly minimally increased in size. No pleural effusion is demonstrated. No mediastinal shift noted. No acute osseous fractures identified.    IMPRESSION: No change in extensive bilateral lung parenchymal airspace opacities. Right-sided pneumothorax is redemonstrated, previously described, possibly minimally increased in size.       DISCRETE X-RAY DATA:  Percent of LEFT lung opacification: 34-66%  Percent of RIGHT lungopacification: %  Change in lung opacification from most recent x-ray (<=3 days): Stable  Change from prior dated 3 or more days (same admission): Increase    < end of copied text >

## 2020-05-11 NOTE — PROGRESS NOTE ADULT - SUBJECTIVE AND OBJECTIVE BOX
OVERNIGHT EVENTS: patient desaturating despite FiO2 100%    Present Symptoms:   Dyspnea:   Nausea/Vomiting:   Anxiety:    Depressed Mood:   Fatigue:   Loss of appetite:   Pain:                   location:   Review of Systems: [All others negative or Unable to obtain due to poor mentation]    MEDICATIONS  (STANDING):  chlorhexidine 0.12% Liquid 15 milliLiter(s) Oral Mucosa every 12 hours  chlorhexidine 2% Cloths 1 Application(s) Topical <User Schedule>  enoxaparin Injectable 70 milliGRAM(s) SubCutaneous every 12 hours  fentaNYL   Infusion. 0.5 MICROgram(s)/kG/Hr (3.54 mL/Hr) IV Continuous <Continuous>  lactulose Syrup 10 Gram(s) Oral every 12 hours  norepinephrine Infusion 0.05 MICROgram(s)/kG/Min (3.32 mL/Hr) IV Continuous <Continuous>  pantoprazole  Injectable 40 milliGRAM(s) IV Push daily  propofol Infusion 10 MICROgram(s)/kG/Min (4.25 mL/Hr) IV Continuous <Continuous>  rocuronium Infusion 8.004 MICROgram(s)/kG/Min (6.8 mL/Hr) IV Continuous <Continuous>  senna 1 Tablet(s) Oral two times a day  vancomycin  IVPB 1000 milliGRAM(s) IV Intermittent every 12 hours    MEDICATIONS  (PRN):  acetaminophen    Suspension .. 650 milliGRAM(s) Oral every 6 hours PRN Temp greater or equal to 38C (100.4F)  sodium chloride 0.9% lock flush 10 milliLiter(s) IV Push every 1 hour PRN Pre/post blood products, medications, blood draw, and to maintain line patency      PHYSICAL EXAM:  Vital Signs Last 24 Hrs  T(C): 37.7 (11 May 2020 12:00), Max: 37.7 (11 May 2020 12:00)  T(F): 99.9 (11 May 2020 12:00), Max: 99.9 (11 May 2020 12:00)  HR: 133 (11 May 2020 12:00) (102 - 135)  BP: 77/53 (10 May 2020 15:40) (77/53 - 77/53)  BP(mean): 64 (10 May 2020 15:40) (64 - 64)  RR: 30 (11 May 2020 12:00) (30 - 34)  SpO2: 86% (11 May 2020 12:00) (76% - 93%)  General: Patient not medically stable for full physical exam. Patient sedated/intubated, on pressor and paralytic.   Karnofsky Performance Score/Palliative Performance Status Version2:    10 %    HEENT: ET tube  Lungs:  tachypnea, on ventilator, FiO2 100%, on fentanyl and propofol; Right pigtail pleural cath in place  CV:  tachycardia, on pressor support  GI:   incontinent, NG tube  :   diggs  Musculoskeletal:  Patient sedated/intubated, on paralytic; dependent on ADLs   Skin: unable to assess  Neuro: Patient sedated/intubated, on paralytic; dependent on ADLs   Oral intake ability: unable/only mouth care   Diet: NPO; continue TF via NG tube        LABS:                          8.0    19.52 )-----------( 812      ( 11 May 2020 05:55 )             26.8     05-11    138  |  96  |  23<H>  ----------------------------<  81  3.3<L>   |  32<H>  |  1.04    Ca    8.2<L>      11 May 2020 05:55  Phos  5.3     05-11  Mg     2.4     05-11    TPro  6.4  /  Alb  1.5<L>  /  TBili  1.1  /  DBili  x   /  AST  30  /  ALT  19  /  AlkPhos  162<H>  05-11        RADIOLOGY & ADDITIONAL STUDIES:  < from: Xray Chest 1 View- PORTABLE-Routine (05.11.20 @ 10:35) >  EXAM:  XR CHEST PORTABLE ROUTINE 1V                            PROCEDURE DATE:  05/11/2020          INTERPRETATION:    Examination: XR CHEST    History: ADMDIAG1: J18.9 PNEUMONIA, UNSPECIFIED ORGANISM/, sob    Findings:  Endotracheal tube nasogastric tube right internal jugular line right chest catheter reidentified in position. Left costophrenic angle excluded. No significant change in bilateral airspace disease and right pneumothorax. No new abnormality.    Impression:  1.  As above      DISCRETE X-RAY DATA:  Percent of LEFT lung opacification: %  Percent of RIGHT lung opacification: %  Change in lung opacification from most recent x-ray (<=3 days): Stable    < end of copied text >      ADVANCE DIRECTIVES: DNR OVERNIGHT EVENTS: patient desaturating despite FiO2 100%    Present Symptoms:   Review of Systems: Unable to obtain due to poor mentation - patient sedated/intubated, on paralytic    MEDICATIONS  (STANDING):  chlorhexidine 0.12% Liquid 15 milliLiter(s) Oral Mucosa every 12 hours  chlorhexidine 2% Cloths 1 Application(s) Topical <User Schedule>  enoxaparin Injectable 70 milliGRAM(s) SubCutaneous every 12 hours  fentaNYL   Infusion. 0.5 MICROgram(s)/kG/Hr (3.54 mL/Hr) IV Continuous <Continuous>  lactulose Syrup 10 Gram(s) Oral every 12 hours  norepinephrine Infusion 0.05 MICROgram(s)/kG/Min (3.32 mL/Hr) IV Continuous <Continuous>  pantoprazole  Injectable 40 milliGRAM(s) IV Push daily  propofol Infusion 10 MICROgram(s)/kG/Min (4.25 mL/Hr) IV Continuous <Continuous>  rocuronium Infusion 8.004 MICROgram(s)/kG/Min (6.8 mL/Hr) IV Continuous <Continuous>  senna 1 Tablet(s) Oral two times a day  vancomycin  IVPB 1000 milliGRAM(s) IV Intermittent every 12 hours    MEDICATIONS  (PRN):  acetaminophen    Suspension .. 650 milliGRAM(s) Oral every 6 hours PRN Temp greater or equal to 38C (100.4F)  sodium chloride 0.9% lock flush 10 milliLiter(s) IV Push every 1 hour PRN Pre/post blood products, medications, blood draw, and to maintain line patency      PHYSICAL EXAM:  Vital Signs Last 24 Hrs  T(C): 37.7 (11 May 2020 12:00), Max: 37.7 (11 May 2020 12:00)  T(F): 99.9 (11 May 2020 12:00), Max: 99.9 (11 May 2020 12:00)  HR: 133 (11 May 2020 12:00) (102 - 135)  BP: 77/53 (10 May 2020 15:40) (77/53 - 77/53)  BP(mean): 64 (10 May 2020 15:40) (64 - 64)  RR: 30 (11 May 2020 12:00) (30 - 34)  SpO2: 86% (11 May 2020 12:00) (76% - 93%)  General: Patient not medically stable for full physical exam. Patient sedated/intubated, on pressor and paralytic.   Karnofsky Performance Score/Palliative Performance Status Version2:    10 %    HEENT: ET tube  Lungs:  tachypnea, on ventilator, FiO2 100%, on fentanyl and propofol; Right pigtail pleural cath in place  CV:  tachycardia, on pressor support  GI:   incontinent, NG tube  :   diggs  Musculoskeletal:  Patient sedated/intubated, on paralytic; dependent on ADLs   Skin: unable to assess  Neuro: Patient sedated/intubated, on paralytic; dependent on ADLs   Oral intake ability: unable/only mouth care   Diet: NPO; continue TF via NG tube        LABS:                          8.0    19.52 )-----------( 812      ( 11 May 2020 05:55 )             26.8     05-11    138  |  96  |  23<H>  ----------------------------<  81  3.3<L>   |  32<H>  |  1.04    Ca    8.2<L>      11 May 2020 05:55  Phos  5.3     05-11  Mg     2.4     05-11    TPro  6.4  /  Alb  1.5<L>  /  TBili  1.1  /  DBili  x   /  AST  30  /  ALT  19  /  AlkPhos  162<H>  05-11        RADIOLOGY & ADDITIONAL STUDIES:  < from: Xray Chest 1 View- PORTABLE-Routine (05.11.20 @ 10:35) >  EXAM:  XR CHEST PORTABLE ROUTINE 1V                        PROCEDURE DATE:  05/11/2020    INTERPRETATION:    Examination: XR CHEST  History: ADMDIAG1: J18.9 PNEUMONIA, UNSPECIFIED ORGANISM/, sob  Findings:  Endotracheal tube nasogastric tube right internal jugular line right chest catheter reidentified in position. Left costophrenic angle excluded. No significant change in bilateral airspace disease and right pneumothorax. No new abnormality.    Impression:  1.  As above    DISCRETE X-RAY DATA:  Percent of LEFT lung opacification: %  Percent of RIGHT lung opacification: %  Change in lung opacification from most recent x-ray (<=3 days): Stable  < end of copied text >      ADVANCE DIRECTIVES: DNR

## 2020-05-11 NOTE — PROGRESS NOTE ADULT - ASSESSMENT
hx ptx, now right pigtail pleural cath in place on suction  Pt on Peep 15  small air leak, stable  minimal pleural fluid output    cxr  pigtail to pleurovac suction  observation

## 2020-05-11 NOTE — PROGRESS NOTE ADULT - PROBLEM SELECTOR PLAN 4
Spoke with patient's partner/Ms. Jelly Gaspar ([phone]). Spoke with patient's partner/Ms. Jelly Gaspar ([phone]); Dr. Woodard present. Discussed status. Partner acknowledges grave prognosis. DNR on file; now agreeable with NO ESCALATION OF CARE. MOLST updated. All questions answered; supportive counseling provided.

## 2020-05-11 NOTE — PROGRESS NOTE ADULT - PROBLEM SELECTOR PLAN 1
2/2 ARDS/shock due to COVID19. Patient remains sedated/intubated, on IV abx, pressor suppport. Hypoxic despite FiO2 100%. Patient with grave prognosis. 91. CXR indicates Small right apical pneumothorax, bilateral infiltrates mildly improved. s/p R chest tube 4/29. Patient remains sedated/intubated, on 1 pressor, paralytic, IV abx. Patient's partner is identified surrogate. 2/2 ARDS/shock due to COVID19, + bacteremic. Patient remains sedated/intubated, on IV abx, pressor suppport. Hypoxic despite FiO2 100%. Patient with grave prognosis. 91. CXR indicates Small right apical pneumothorax, bilateral infiltrates mildly improved. s/p R chest tube 4/29. Patient remains sedated/intubated, on 1 pressor, paralytic, IV abx. Patient's partner is identified surrogate. 2/2 ARDS/shock due to COVID19, + bacteremic. s/p R chest tube 4/29. Patient remains sedated/intubated, on IV abx, pressor suppport. Hypoxic despite FiO2 100%. Patient with grave prognosis. Patient's partner is identified surrogate; patient now DNR / NO ESCALATION OF CARE.

## 2020-05-11 NOTE — PROGRESS NOTE ADULT - SUBJECTIVE AND OBJECTIVE BOX
Time of Visit:  Patient seen and examined.     MEDICATIONS  (STANDING):  chlorhexidine 0.12% Liquid 15 milliLiter(s) Oral Mucosa every 12 hours  chlorhexidine 2% Cloths 1 Application(s) Topical <User Schedule>  enoxaparin Injectable 70 milliGRAM(s) SubCutaneous every 12 hours  fentaNYL   Infusion. 0.5 MICROgram(s)/kG/Hr (3.54 mL/Hr) IV Continuous <Continuous>  lactulose Syrup 10 Gram(s) Oral every 12 hours  norepinephrine Infusion 0.05 MICROgram(s)/kG/Min (3.32 mL/Hr) IV Continuous <Continuous>  pantoprazole  Injectable 40 milliGRAM(s) IV Push daily  propofol Infusion 10 MICROgram(s)/kG/Min (4.25 mL/Hr) IV Continuous <Continuous>  senna 1 Tablet(s) Oral two times a day  vancomycin  IVPB 1000 milliGRAM(s) IV Intermittent every 12 hours      MEDICATIONS  (PRN):  acetaminophen    Suspension .. 650 milliGRAM(s) Oral every 6 hours PRN Temp greater or equal to 38C (100.4F)  sodium chloride 0.9% lock flush 10 milliLiter(s) IV Push every 1 hour PRN Pre/post blood products, medications, blood draw, and to maintain line patency       Medications up to date at time of exam.      PHYSICAL EXAMINATION:  Patient has no new complaints.  GENERAL: The patient is a well-developed, well-nourished, in no apparent distress.     Vital Signs Last 24 Hrs  T(C): 37.7 (11 May 2020 16:00), Max: 38 (11 May 2020 14:00)  T(F): 99.8 (11 May 2020 16:00), Max: 100.4 (11 May 2020 14:00)  HR: 119 (11 May 2020 16:00) (102 - 133)  BP: --  BP(mean): --  RR: 34 (11 May 2020 16:00) (30 - 34)  SpO2: 85% (11 May 2020 16:00) (85% - 93%)  Mode: AC/ CMV (Assist Control/ Continuous Mandatory Ventilation)  RR (machine): 34  FiO2: 100  PEEP: 15  ITime: 1  MAP: 31  PC: 30  PIP: 47   (if applicable)    Chest Tube (if applicable)    HEENT: Head is normocephalic and atraumatic. Extraocular muscles are intact. Mucous membranes are moist.     NECK: Supple, no palpable adenopathy.    LUNGS: Clear to auscultation, no wheezing, rales, or rhonchi. + Right chest tube     HEART: Regular rate and rhythm without murmur.    ABDOMEN: Soft, nontender, and nondistended.  No hepatosplenomegaly is noted.    : No painful voiding, no pelvic pain    EXTREMITIES: Without any cyanosis, clubbing, rash, lesions or edema.    NEUROLOGIC: Awake, alert, oriented, grossly intact    SKIN: Warm, dry, good turgor.      LABS:                        8.0    19.52 )-----------( 812      ( 11 May 2020 05:55 )             26.8     05-11    138  |  96  |  23<H>  ----------------------------<  81  3.3<L>   |  32<H>  |  1.04    Ca    8.2<L>      11 May 2020 05:55  Phos  5.3     05-11  Mg     2.4     05-11    TPro  6.4  /  Alb  1.5<L>  /  TBili  1.1  /  DBili  x   /  AST  30  /  ALT  19  /  AlkPhos  162<H>  05-11        ABG - ( 11 May 2020 04:39 )  pH, Arterial: 7.35  pH, Blood: x     /  pCO2: 60    /  pO2: 52    / HCO3: 32    / Base Excess: 6.1   /  SaO2: 83                          Procalcitonin, Serum: 4.06 ng/mL (05-10-20 @ 10:37)  Procalcitonin, Serum: 7.50 ng/mL (05-09-20 @ 10:51)      MICROBIOLOGY: (if applicable)    RADIOLOGY & ADDITIONAL STUDIES:  EKG:   CXR:< from: Xray Chest 1 View- PORTABLE-Routine (05.11.20 @ 10:35) >    EXAM:  XR CHEST PORTABLE ROUTINE 1V                            PROCEDURE DATE:  05/11/2020          INTERPRETATION:    Examination: XR CHEST    History: ADMDIAG1: J18.9 PNEUMONIA, UNSPECIFIED ORGANISM/, sob    Findings:  Endotracheal tube nasogastric tube right internal jugular line right chest catheter reidentified in position. Left costophrenic angle excluded. No significant change in bilateral airspace disease and right pneumothorax. No new abnormality.    Impression:  1.  As above      DISCRETE X-RAY DATA:  Percent of LEFT lung opacification: %  Percent of RIGHT lung opacification: %  Change in lung opacification from most recent x-ray (<=3 days): Stable  Change from prior dated 3 or more days (same admission): Stable                  MANDI BOJORQUEZ M.D., ATTENDING RADIOLOGIST  This document has been electronically signed. May 11 2020 10:39AM                < end of copied text >    ECHO:    IMPRESSION: 49y Male PAST MEDICAL & SURGICAL HISTORY:  No pertinent past medical history  No significant past surgical history   p/w         IMP:  This is a 49 yr old man with prior mentioned medical condition admitted with acute hypoxic resp failure due to b/l pna secondary to covid-19 infection. He was intubated 4/20.    S/p IL-6, plaquenil and  steroids .   Pat had new right PTX.  s./p R ight  chest tube . Pat family agrees for no escalation of care and DNR    -acute hypoxic resp failure  -b/l pna  -covid-19 infection  -ARDS  -septic shock  -Right PTX 4/29      Plan:  -continue vent support with lung protective strategy  -no escalation of care  -chest tube to suction   -decrease sedated / pain control  -SBT   -continue pressors to maintain MAP>65  -continue anticoag  -ngt feed      case discussed with icu team

## 2020-05-11 NOTE — PROGRESS NOTE ADULT - PROBLEM SELECTOR PLAN 2
2/2 ARDS due to COVID19. WBC: 19.52. Patient remains sedated/intubated, on pressor, paralytic, IV abx. Patient's partner is identified surrogate. DNR on file. 2/2 ARDS due to COVID19; comorbid bacteremia. WBC: 19.52. Patient remains sedated/intubated, on pressor, paralytic, IV abx. Patient's partner is identified surrogate. DNR on file. 2/2 ARDS due to COVID19; comorbid bacteremia. WBC: 19.52. Patient remains sedated/intubated, on pressor, paralytic, IV abx. Patient now DNR / no escalation of care.

## 2020-05-11 NOTE — CHART NOTE - NSCHARTNOTEFT_GEN_A_CORE
Patient noted to have possible leak in endotracheal tube balloon with subsequent lower tidal volumes possibly as a result of balloon leak. In conjunction with anesthesia patient examined at bedside to evaluate endotracheal tube and ventilator settings. Patient Spo2 is at 99% with tidal volumes ranging from 300-450ml. Cuff reinflated and advanced with tidal volumes achieved at 450ml consistently. Due to potential for aerosolization of viral particles and loss of airway due to edema in the setting of prolonged intubation, we do not feel changing the tube with bougie or tube exchanger at this time is indicated due to adequate tidal volumes with advancement of tube.

## 2020-05-11 NOTE — PROGRESS NOTE ADULT - SUBJECTIVE AND OBJECTIVE BOX
Pt intubated, sedated,  ICU Vital Signs Last 24 Hrs  T(C): 36.7 (11 May 2020 04:00), Max: 37.3 (10 May 2020 20:00)  T(F): 98.1 (11 May 2020 04:00), Max: 99.2 (10 May 2020 20:00)  HR: 109 (11 May 2020 05:12) (102 - 135)  BP: 77/53 (10 May 2020 15:40) (77/53 - 109/62)  BP(mean): 64 (10 May 2020 15:40) (64 - 77)  ABP: 96/55 (11 May 2020 04:00) (96/55 - 107/64)  ABP(mean): 68 (11 May 2020 04:00) (68 - 78)  RR: 34 (11 May 2020 04:00) (34 - 34)  SpO2: 93% (11 May 2020 05:12) (66% - 93%)    Right pigtail pleural cath in place: small air leak, stable, output 95cc clear fluid 24 hrs. on suction.  no crepitus                          8.0    19.52 )-----------( 812      ( 11 May 2020 05:55 )             26.8     05-11    138  |  96  |  23<H>  ----------------------------<  81  3.3<L>   |  32<H>  |  1.04    Ca    8.2<L>      11 May 2020 05:55  Phos  5.3     05-11  Mg     2.4     05-11    TPro  6.4  /  Alb  1.5<L>  /  TBili  1.1  /  DBili  x   /  AST  30  /  ALT  19  /  AlkPhos  162<H>  05-11

## 2020-05-11 NOTE — PROGRESS NOTE ADULT - SUBJECTIVE AND OBJECTIVE BOX
· Subjective and Objective:   INTERVAL /OVERNIGHT EVENTS:  remained proned, requiring high oxygen requirement     PRESSORS: [x ] YES [ ] NO  WHICH: levo  T(C): 37.4 (05-11-20 @ 08:00), Max: 37.4 (05-11-20 @ 08:00)  HR: 116 (05-11-20 @ 08:49) (102 - 135)  BP: 77/53 (05-10-20 @ 15:40) (77/53 - 109/62)  RR: 34 (05-11-20 @ 08:00) (34 - 34)  SpO2: 93% (05-11-20 @ 08:49) (66% - 93%)  05-10-20 @ 07:01  -  05-11-20 @ 07:00  --------------------------------------------------------  IN: 0 mL / OUT: 1895 mL / NET: -1895 mL    acetaminophen    Suspension .. 650 milliGRAM(s) Oral every 6 hours PRN  chlorhexidine 0.12% Liquid 15 milliLiter(s) Oral Mucosa every 12 hours  chlorhexidine 2% Cloths 1 Application(s) Topical <User Schedule>  enoxaparin Injectable 70 milliGRAM(s) SubCutaneous every 12 hours  fentaNYL   Infusion. 0.5 MICROgram(s)/kG/Hr IV Continuous <Continuous>  lactulose Syrup 10 Gram(s) Oral every 12 hours  norepinephrine Infusion 0.05 MICROgram(s)/kG/Min IV Continuous <Continuous>  pantoprazole  Injectable 40 milliGRAM(s) IV Push daily  propofol Infusion 10 MICROgram(s)/kG/Min IV Continuous <Continuous>  rocuronium Infusion 8.004 MICROgram(s)/kG/Min IV Continuous <Continuous>  senna 1 Tablet(s) Oral two times a day  sodium chloride 0.9% lock flush 10 milliLiter(s) IV Push every 1 hour PRN  vancomycin  IVPB 1000 milliGRAM(s) IV Intermittent every 12 hours  Mode: AC/ CMV (Assist Control/ Continuous Mandatory Ventilation), RR (machine): 34, FiO2: 100, PEEP: 15, ITime: 1, MAP: 31, PC: 30, PIP: 48      GENERAL:   sedated & paralyzed, prone, limited exam      LABS:  Na: 138 (05-11 @ 05:55), 140 (05-10 @ 06:20), 139 (05-09 @ 06:10)  K: 3.3 (05-11 @ 05:55), 3.6 (05-10 @ 06:20), 3.9 (05-09 @ 06:10)  Cl: 96 (05-11 @ 05:55), 103 (05-10 @ 06:20), 98 (05-09 @ 06:10)  CO2: 32 (05-11 @ 05:55), 32 (05-10 @ 06:20), 35 (05-09 @ 06:10)  BUN: 23 (05-11 @ 05:55), 15 (05-10 @ 06:20), 17 (05-09 @ 06:10)  Cr: 1.04 (05-11 @ 05:55), 0.64 (05-10 @ 06:20), 0.72 (05-09 @ 06:10)  Glu: 81(05-11 @ 05:55), 95(05-10 @ 06:20), 87(05-09 @ 06:10)    Hgb: 8.0 (05-11 @ 05:55), 7.9 (05-10 @ 06:20), 8.2 (05-09 @ 06:10)  Hct: 26.8 (05-11 @ 05:55), 26.1 (05-10 @ 06:20), 27.7 (05-09 @ 06:10)  WBC: 19.52 (05-11 @ 05:55), 17.31 (05-10 @ 06:20), 18.83 (05-09 @ 06:10)  Plt: 812 (05-11 @ 05:55), 740 (05-10 @ 06:20), 715 (05-09 @ 06:10)    INR:   PTT:         Blood Cultures:  Growth in anaerobic bottle: Gram Positive Cocci in Pairs and Chains  "Due to technical problems, Proteus sp. will Not be reported as part of  the BCID panel until further notice"  ***Blood Panel PCR results on this specimen are available  approximately 3 hours after the Gram stain result.***  Gram stain, PCR, and/or culture results may not always  correspond due to difference in methodologies.  ************************************************************  This PCR assay was performed using [employer].             The following targets are tested for: Enterococcus,  vancomycin resistant enterococci, Listeria monocytogenes,  coagulase negative staphylococci, S. aureus,  methicillin resistant S. aureus, Streptococcus agalactiae  (Group B), S. pneumoniae, S. pyogenes (Group A),  Acinetobacter baumannii, Enterobacter cloacae, E. coli,  Klebsiella oxytoca, K. pneumoniae, Proteus sp.,  Serratia marcescens, Haemophilus influenzae,  Neisseria meningitidis, Pseudomonas aeruginosa, Candida  albicans, C. glabrata, C krusei, C parapsilosis,  C. tropicalis and the KPC resistance gene. (05-07 @ 02:55)      < from: Xray Chest 1 View- PORTABLE-Routine (05.10.20 @ 10:14) >  INTERPRETATION:  INDICATION: COVID PNA; ventilatory support.    PRIORS: 5/9/2020 7:11 PM  VIEWS: Portable AP radiography of the chest performed.  FINDINGS: Since prior evaluation there is no significant interval change in position of the indwelling ETT, NGT or right IJ CVC. The right-sided pigtail catheter is unchanged in position. Extensive bilateral lung parenchymal airspace opacities are unchanged. Right-sided pneumothorax is redemonstrated, previously described, possibly minimally increased in size. No pleural effusion is demonstrated. No mediastinal shift noted. No acute osseous fractures identified.    IMPRESSION: No change in extensive bilateral lung parenchymal airspace opacities. Right-sided pneumothorax is redemonstrated, previously described, possibly minimally increased in size.       DISCRETE X-RAY DATA:  Percent of LEFT lung opacification: 34-66%  Percent of RIGHT lungopacification: %  Change in lung opacification from most recent x-ray (<=3 days): Stable  Change from prior dated 3 or more days (same admission): Increase    < end of copied text >      Assessment and Plan:   · Assessment	  ARDS respiratory failure     Neuro  - sedated on propofol, versed, fentanyl and julia for syncrony with vent     Cardiovascular  - keep map >65  - On levophed     Pulmonary  - Acute hypoxic respiratory failure secondary to covid  - s/p pigtail insertion for pneumo 4/29, monitor xrays   xray worse   hypoxemia on 100%FiO2, PEEP 15, plateau elevated, unable to oxygenate, will supine him today  now on PCV 30/35/100/15  Blood Gas Profile - Arterial (05.11.20 @ 04:39)    pH, Arterial: 7.35    pCO2, Arterial: 60 mmHg    pO2, Arterial: 52 mmHg    HCO3, Arterial: 32 mmol/L    Base Excess, Arterial: 6.1 mmol/L    Oxygen Saturation, Arterial: 83 %    FIO2, Arterial: 100    Blood Gas Comments Arterial: PC RR 34 Pi 30 100% +15 Ti 1.0  A LINE  respiratory acidosis     Infections  - COVID-19 viral pneumonia. Completed Plaquenil, Solumedrol and Tocilizumab.    - enteroccocci in blood on vanco will f/u with trough today    Nephro  - DAVIS, Cr increasing, will avoid diuresis   -if creat increase will gently hydrate   - monitor I&O    Gastrointestinal  - TF on hold because he is prone   -resume once supines  -mag citrate for ileus   - pantoprazole     Heme  - Elevated D-dimer > 1000, continue full dose anticoagulation. lovenox     Endocrine  - Monitor finger sticks between 120-180.     Prophylaxis   - Full dose anticoagulation for elevated D-dimer.     GOC:  DNR   prognosis poor, will have goals of care discussion, unable to oxygenate

## 2020-05-11 NOTE — PROGRESS NOTE ADULT - PROBLEM SELECTOR PLAN 3
2/2 COVID19; comorbid shock. Patient hypoxic despite FiO2 100%. Patient remains sedated/intubated, on pressor, paralytic, IV abx. Patient's partner is identified surrogate. 2/2 COVID19; comorbid shock and bacteremia. Patient hypoxic despite FiO2 100%. Patient remains sedated/intubated, on pressor, paralytic, IV abx. Patient's partner is identified surrogate. 2/2 COVID19; comorbid shock and bacteremia. Patient hypoxic despite FiO2 100%. Patient remains sedated/intubated, on pressor, paralytic, IV abx. Patient now DNR / no escalation of care.

## 2020-05-12 NOTE — PROGRESS NOTE ADULT - PROBLEM SELECTOR PLAN 2
Secondary to COVID, ARDS.  Continue mechanical ventilation.  Decrease RR to 20. Decrease PEEP to 10.  Not a candidate for weaning.

## 2020-05-12 NOTE — RAPID RESPONSE TEAM SUMMARY - NSSITUATIONBACKGROUNDRRT_GEN_ALL_CORE
Rapid response was call for hypoxia 02 saturation was in 30s, ETT was found to leaking, pt was seen by Anesthesiologist and ETT tube balloon was found above the vocal cord, ETT was changed.

## 2020-05-12 NOTE — PROGRESS NOTE ADULT - SUBJECTIVE AND OBJECTIVE BOX
Asked to see patient due to significant et tube leak.  Cuff was above vocal cords and was not able to be advanced, saturation was low, ambu bag did not improve saturation. Tube was removed pt was ventilated per ambu, 8.0 et tube was placed. Breath sounds were verified, saturation improved moderately. Chest xray demonstrated continuing pneumothorax on the right. Correct et tube placement.

## 2020-05-12 NOTE — PROGRESS NOTE ADULT - ASSESSMENT
49 year old male. +COVID admitted for respiatory failure secondary to COVID. Intubated 4/20. Right IJ inserted 4/20. S/P Plaquenil, solumedrol and Tocilizmab.  5/7 +Bacteremia Ecoli.  5/11  Transferred to   5/12  ETT not in position, cuff leaking. RR called. Oxygen saturation 30%. Patient reintubated by anesthesia. New 8.0 ETT inserted. CXR shows ETT in place  Progression  right pneumothorax visualized.

## 2020-05-12 NOTE — PROGRESS NOTE ADULT - PROBLEM SELECTOR PLAN 3
CXR post intubation shows progression of right pneumothorax.  Continue chest tube. CXR post intubation shows progression of right pneumothorax.  Continue chest tube to suction

## 2020-05-12 NOTE — PROGRESS NOTE ADULT - SUBJECTIVE AND OBJECTIVE BOX
Time of Visit:  Patient seen and examined.     MEDICATIONS  (STANDING):  chlorhexidine 0.12% Liquid 15 milliLiter(s) Oral Mucosa every 12 hours  chlorhexidine 2% Cloths 1 Application(s) Topical <User Schedule>  HYDROmorphone Infusion 2 mG/Hr (2 mL/Hr) IV Continuous <Continuous>  norepinephrine Infusion 0.05 MICROgram(s)/kG/Min (3.32 mL/Hr) IV Continuous <Continuous>  pantoprazole  Injectable 40 milliGRAM(s) IV Push daily      MEDICATIONS  (PRN):  acetaminophen    Suspension .. 650 milliGRAM(s) Oral every 6 hours PRN Temp greater or equal to 38C (100.4F)  sodium chloride 0.9% lock flush 10 milliLiter(s) IV Push every 1 hour PRN Pre/post blood products, medications, blood draw, and to maintain line patency       Medications up to date at time of exam.      PHYSICAL EXAMINATION:  Patient has no new complaints.  GENERAL: The patient is a well-developed, well-nourished, in no apparent distress.     Vital Signs Last 24 Hrs  T(C): 36.6 (12 May 2020 13:49), Max: 37.4 (12 May 2020 00:48)  T(F): 97.8 (12 May 2020 13:49), Max: 99.4 (12 May 2020 00:48)  HR: 125 (12 May 2020 13:49) (114 - 126)  BP: 101/51 (12 May 2020 13:49) (101/44 - 129/67)  BP(mean): --  RR: 24 (12 May 2020 13:49) (24 - 34)  SpO2: 86% (12 May 2020 13:49) (86% - 100%)  Mode: AC/ CMV (Assist Control/ Continuous Mandatory Ventilation)  RR (machine): 20  TV (machine): 450  FiO2: 100  PEEP: 10  ITime: 1  MAP: 16  PIP: 32   (if applicable)    Chest Tube (if applicable)    HEENT: Head is normocephalic and atraumatic. Extraocular muscles are intact. Mucous membranes are moist.  +ETT    NECK: Supple, no palpable adenopathy.    LUNGS: Clear to auscultation, no wheezing, rales, or rhonchi. b/L chest tube     HEART: Regular rate and rhythm without murmur.    ABDOMEN: Soft, nontender, and nondistended.  No hepatosplenomegaly is noted.    : No painful voiding, no pelvic pain    EXTREMITIES: Without any cyanosis, clubbing, rash, lesions or edema.    NEUROLOGIC: sedated    SKIN: Warm, dry, good turgor.      LABS:                        8.0    19.52 )-----------( 812      ( 11 May 2020 05:55 )             26.8     05-11    138  |  96  |  23<H>  ----------------------------<  81  3.3<L>   |  32<H>  |  1.04    Ca    8.2<L>      11 May 2020 05:55  Phos  5.3     05-11  Mg     2.4     05-11    TPro  6.4  /  Alb  1.5<L>  /  TBili  1.1  /  DBili  x   /  AST  30  /  ALT  19  /  AlkPhos  162<H>  05-11        ABG - ( 11 May 2020 04:39 )  pH, Arterial: 7.35  pH, Blood: x     /  pCO2: 60    /  pO2: 52    / HCO3: 32    / Base Excess: 6.1   /  SaO2: 83                          Procalcitonin, Serum: 4.06 ng/mL (05-10-20 @ 10:37)      MICROBIOLOGY: (if applicable)    RADIOLOGY & ADDITIONAL STUDIES:  EKG:   CXR:< from: Xray Chest 1 View-PORTABLE IMMEDIATE (05.12.20 @ 10:07) >    EXAM:  XR CHEST PORTABLE IMMED 1V                            PROCEDURE DATE:  05/12/2020          INTERPRETATION:    Examination: XR CHEST IMMEDIATE    History: ADMDIAG1: J18.9 PNEUMONIA, UNSPECIFIED ORGANISM/, intubated    COMPARISON: 5/11/2020    Findings:  Progression of right pneumothorax to at least 60% of the right chest. Endotracheal tube, nasogastric tube, right jugular line and right pigtail catheter remain present.    Pulmonary infiltrates are similar with allowance for collapse of the right lung.    Impression:  1.  Progression of right pneumothorax as noted.    2.  The above findings were conveyed by telephone to IZABELLA Garcia at 10:15 AM and JEROME Mai at 11:00 AM on the morning of the examination.      DISCRETE X-RAY DATA:  Percent of LEFT lung opacification: %  Percent of RIGHT lung opacification: %  Change in lung opacification from most recent x-ray (<=3 days): Stable  Change from prior dated 3 or more days (same admission): Stable                  THOMAS BRADY, ATTENDING RADIOLOGIST  This document has been electronically signed. May 12 2020 11:01AM                < end of copied text >    ECHO:    IMPRESSION: 49y Male PAST MEDICAL & SURGICAL HISTORY:  No pertinent past medical history  No significant past surgical history   p/w           IMP:  This is a 49 yr old man with prior mentioned medical condition admitted with acute hypoxic resp failure due to b/l pna secondary to covid-19 infection. He was intubated 4/20.    S/p IL-6, plaquenil and  steroids .   Pat had new right PTX.  s./p R ight  chest tube . Pat family agrees for no escalation of care and DNR. Today CXR shows large right PTX . Pat was re-intubated over night for hypoxia  .. still hypoxic  His family agreed for comfort care.     -acute hypoxic resp failure  -b/l pna  -covid-19 infection  -ARDS  -septic shock  -Right PTX 4/29 (worst)      Plan:  -continue vent support with lung protective strategy  -comfort care  -chest tube to suction   -pain control and sedation as needed  -continue vent support      d/c with NP

## 2020-05-12 NOTE — PROGRESS NOTE ADULT - ATTENDING COMMENTS
Overall prognosis is grim. Likely hours to days - family is aware. No escalation of aggressive care - will c/w ventilation and supportive care with symptom management as needed.

## 2020-05-12 NOTE — PROGRESS NOTE ADULT - PROBLEM SELECTOR PLAN 9
Continue full rivers lovenox/elevated d-dimer.  Continue supportive care.  Overall prognosis is grim. Likely hours to days. Continue full rivers lovenox/elevated d-dimer.  Continue supportive care.  Overall prognosis is grim. Likely hours to days - family is aware.

## 2020-05-12 NOTE — CHART NOTE - NSCHARTNOTEFT_GEN_A_CORE
EVENT: Possible leak in endotracheal tube balloon    This is a 49 yr old man was admitted with acute respiratory failure with hypoxia due to b/l PNA secondary to covid-19 infection, + bacteremic. s/p R chest tube 4/29. He was intubated 4/20.  S/p IL-6. Patient remains sedated/intubated, on IV abx, pressor support. Patient with grave prognosis. Patient now DNR / NO ESCALATION OF CARE. Patient transferred to medicine floor to continue comfort measures and supportive care.   NP notified by RN of patient with a defective endotracheal tube (ET), with a possible leak in endotracheal tube balloon.  Dr. Burrell from ICU, and Dr. Gonzalez from anesthesia examined at bedside to evaluate endotracheal tube and ventilator settings.  As per Dr. Gonzalez due to potential for aerosolization of viral particles and loss of airway due Acute respiratory failure with hypoxia. The cuff was  reinflated and advanced with tidal volumes achieved at 450ml consistently. Patient 02 sat 92% via vent  No further intervention at this time    Vital Signs Last 24 Hrs  T(C): 37.4 (12 May 2020 00:48)  T(F): 99.4 (12 May 2020 00:48)  HR: 126 (12 May 2020 00:48)   BP: 129/67 (12 May 2020 00:48)   RR: 34 (12 May 2020 00:48)   SpO2: 92% (12 May 2020 01:02)  Plan:  -Continue vent support with lung protective strategy  -No escalation of care  -Continue to monitor   - cont to monitor patient 02 saturation

## 2020-05-12 NOTE — PROGRESS NOTE ADULT - PROBLEM SELECTOR PLAN 1
S/P Plaquenil, solumedrol, and Tocilizmab.  Continue mechanical ventilation.  Not a candidate for weaning.  Continue supportive care.

## 2020-05-12 NOTE — PROGRESS NOTE ADULT - PROBLEM SELECTOR PLAN 6
Secondary to bacteremia/ Enterococcus Faecium.  Continue vanco. today's dose being held secondary to elevated trough.  Repeat trough in am prior to dose.

## 2020-05-12 NOTE — PROGRESS NOTE ADULT - SUBJECTIVE AND OBJECTIVE BOX
DNR [x ]   DNI  [  ]    INTERVAL HPI/OVERNIGHT EVENTS: ***transferred from ICU last night. Orally intubated. ETT cuff leaking.    PRESSORS: [x ] YES [ ] NO  WHICH: Levophed    ANTIBIOTICS:                  DATE STARTED:  ANTIBIOTICS:                  DATE STARTED:  ANTIBIOTICS:                  DATE STARTED:    vancomycin  IVPB 1000 milliGRAM(s) IV Intermittent every 12 hours    Cardiovascular:  Heart Failure  Acute   Acute on Chronic  Chronic       norepinephrine Infusion 0.05 MICROgram(s)/kG/Min IV Continuous <Continuous>    Pulmonary:    Hematalogic:  enoxaparin Injectable 70 milliGRAM(s) SubCutaneous every 12 hours    Other:  acetaminophen    Suspension .. 650 milliGRAM(s) Oral every 6 hours PRN  chlorhexidine 0.12% Liquid 15 milliLiter(s) Oral Mucosa every 12 hours  chlorhexidine 2% Cloths 1 Application(s) Topical <User Schedule>  fentaNYL   Infusion. 0.5 MICROgram(s)/kG/Hr IV Continuous <Continuous>  lactulose Syrup 10 Gram(s) Oral every 12 hours  pantoprazole  Injectable 40 milliGRAM(s) IV Push daily  propofol Infusion 10 MICROgram(s)/kG/Min IV Continuous <Continuous>  senna 1 Tablet(s) Oral two times a day  sodium chloride 0.9% lock flush 10 milliLiter(s) IV Push every 1 hour PRN    acetaminophen    Suspension .. 650 milliGRAM(s) Oral every 6 hours PRN  chlorhexidine 0.12% Liquid 15 milliLiter(s) Oral Mucosa every 12 hours  chlorhexidine 2% Cloths 1 Application(s) Topical <User Schedule>  enoxaparin Injectable 70 milliGRAM(s) SubCutaneous every 12 hours  fentaNYL   Infusion. 0.5 MICROgram(s)/kG/Hr IV Continuous <Continuous>  lactulose Syrup 10 Gram(s) Oral every 12 hours  norepinephrine Infusion 0.05 MICROgram(s)/kG/Min IV Continuous <Continuous>  pantoprazole  Injectable 40 milliGRAM(s) IV Push daily  propofol Infusion 10 MICROgram(s)/kG/Min IV Continuous <Continuous>  senna 1 Tablet(s) Oral two times a day  sodium chloride 0.9% lock flush 10 milliLiter(s) IV Push every 1 hour PRN  vancomycin  IVPB 1000 milliGRAM(s) IV Intermittent every 12 hours    Drug Dosing Weight  Height (cm): 157.48 (15 Apr 2020 13:15)  Weight (kg): 70.8 (15 Apr 2020 13:15)  BMI (kg/m2): 28.5 (15 Apr 2020 13:15)  BSA (m2): 1.72 (15 Apr 2020 13:15)    CENTRAL LINE: [x ] YES [ ] NO  LOCATION: [facility]   DATE INSERTED:  4/20  REMOVE: [ ] YES [ ] NO  EXPLAIN:    GANDHI: [ ] YES [ ] NO    DATE INSERTED:  REMOVE:  [ ] YES [ ] NO  EXPLAIN:    A-LINE:  [ ] YES [ ] NO  LOCATION:   DATE INSERTED:  REMOVE:  [ ] YES [ ] NO  EXPLAIN:    PAST MEDICAL & SURGICAL HISTORY:  No pertinent past medical history  No significant past surgical history        ABG - ( 11 May 2020 04:39 )  pH, Arterial: 7.35  pH, Blood: x     /  pCO2: 60    /  pO2: 52    / HCO3: 32    / Base Excess: 6.1   /  SaO2: 83                    05-11 @ 07:01  -  05-12 @ 07:00  --------------------------------------------------------  IN: 1327 mL / OUT: 890 mL / NET: 437 mL        Mode: AC/ CMV (Assist Control/ Continuous Mandatory Ventilation)  RR (machine): 20  TV (machine): 450  FiO2: 100  PEEP: 15  MAP: 18  PIP: 40      PHYSICAL EXAM:    GENERAL: NAD, well-groomed, well-developed  HEAD:  Atraumatic, Normocephalic  EYES: EOMI, PERRLA, conjunctiva and sclera clear  ENMT: No tonsillar erythema, exudates, or enlargement; Moist mucous membranes, Good dentition, No lesions  NECK: Supple, No JVD, Normal thyroid  NERVOUS SYSTEM:  Alert & Oriented X3, Good concentration; Motor Strength 5/5 B/L upper and lower extremities; DTRs 2+ intact and symmetric  CHEST/LUNG: Clear to percussion bilaterally; No rales, rhonchi, wheezing, or rubs  HEART: Regular rate and rhythm; No murmurs, rubs, or gallops  ABDOMEN: Soft, Nontender, Nondistended; Bowel sounds present  EXTREMITIES:  2+ Peripheral Pulses, No clubbing, cyanosis, or edema  LYMPH: No lymphadenopathy noted  SKIN: No rashes or lesions      LABS:  CBC Full  -  ( 11 May 2020 05:55 )  WBC Count : 19.52 K/uL  RBC Count : 2.76 M/uL  Hemoglobin : 8.0 g/dL  Hematocrit : 26.8 %  Platelet Count - Automated : 812 K/uL  Mean Cell Volume : 97.1 fl  Mean Cell Hemoglobin : 29.0 pg  Mean Cell Hemoglobin Concentration : 29.9 gm/dL  Auto Neutrophil # : 15.91 K/uL  Auto Lymphocyte # : 1.23 K/uL  Auto Monocyte # : 0.90 K/uL  Auto Eosinophil # : 0.21 K/uL  Auto Basophil # : 0.05 K/uL  Auto Neutrophil % : 81.4 %  Auto Lymphocyte % : 6.3 %  Auto Monocyte % : 4.6 %  Auto Eosinophil % : 1.1 %  Auto Basophil % : 0.3 %    05-11    138  |  96  |  23<H>  ----------------------------<  81  3.3<L>   |  32<H>  |  1.04    Ca    8.2<L>      11 May 2020 05:55  Phos  5.3     05-11  Mg     2.4     05-11    TPro  6.4  /  Alb  1.5<L>  /  TBili  1.1  /  DBili  x   /  AST  30  /  ALT  19  /  AlkPhos  162<H>  05-11              [  ]  DVT Prophylaxis  [  ]  Nutrition, Brand, Rate         Goal Rate         Abdominal Nutritional Status -  Malnutrition   Cachexia      Morbid Obesity BMI >/=40    RADIOLOGY & ADDITIONAL STUDIES:  ***    [  ] Goals of Care Discussion with Family/Proxy/Other           Elements of Conversation Discussed: Patient/Family understanding of current illness   Advanced Directives                                                                       Prognosis  Treatment Options  Care Aligned with patient's wishes                                             TIME SPENT: 35 minutes DNR [x ]   DNI  [  ]    INTERVAL HPI/OVERNIGHT EVENTS: ***transferred from ICU last night. Orally intubated. ETT cuff leaking.    PRESSORS: [x ] YES [ ] NO  WHICH: Levophed    ANTIBIOTICS:                  DATE STARTED:  ANTIBIOTICS:                  DATE STARTED:  ANTIBIOTICS:                  DATE STARTED:    vancomycin  IVPB 1000 milliGRAM(s) IV Intermittent every 12 hours    Cardiovascular:  Heart Failure  Acute   Acute on Chronic  Chronic       norepinephrine Infusion 0.05 MICROgram(s)/kG/Min IV Continuous <Continuous>    Pulmonary:    Hematalogic:  enoxaparin Injectable 70 milliGRAM(s) SubCutaneous every 12 hours    Other:  acetaminophen    Suspension .. 650 milliGRAM(s) Oral every 6 hours PRN  chlorhexidine 0.12% Liquid 15 milliLiter(s) Oral Mucosa every 12 hours  chlorhexidine 2% Cloths 1 Application(s) Topical <User Schedule>  fentaNYL   Infusion. 0.5 MICROgram(s)/kG/Hr IV Continuous <Continuous>  lactulose Syrup 10 Gram(s) Oral every 12 hours  pantoprazole  Injectable 40 milliGRAM(s) IV Push daily  propofol Infusion 10 MICROgram(s)/kG/Min IV Continuous <Continuous>  senna 1 Tablet(s) Oral two times a day  sodium chloride 0.9% lock flush 10 milliLiter(s) IV Push every 1 hour PRN    acetaminophen    Suspension .. 650 milliGRAM(s) Oral every 6 hours PRN  chlorhexidine 0.12% Liquid 15 milliLiter(s) Oral Mucosa every 12 hours  chlorhexidine 2% Cloths 1 Application(s) Topical <User Schedule>  enoxaparin Injectable 70 milliGRAM(s) SubCutaneous every 12 hours  fentaNYL   Infusion. 0.5 MICROgram(s)/kG/Hr IV Continuous <Continuous>  lactulose Syrup 10 Gram(s) Oral every 12 hours  norepinephrine Infusion 0.05 MICROgram(s)/kG/Min IV Continuous <Continuous>  pantoprazole  Injectable 40 milliGRAM(s) IV Push daily  propofol Infusion 10 MICROgram(s)/kG/Min IV Continuous <Continuous>  senna 1 Tablet(s) Oral two times a day  sodium chloride 0.9% lock flush 10 milliLiter(s) IV Push every 1 hour PRN  vancomycin  IVPB 1000 milliGRAM(s) IV Intermittent every 12 hours    Drug Dosing Weight  Height (cm): 157.48 (15 Apr 2020 13:15)  Weight (kg): 70.8 (15 Apr 2020 13:15)  BMI (kg/m2): 28.5 (15 Apr 2020 13:15)  BSA (m2): 1.72 (15 Apr 2020 13:15)    CENTRAL LINE: [x ] YES [ ] NO  LOCATION: [facility]   DATE INSERTED:  4/20  REMOVE: [ ] YES [x ] NO  EXPLAIN:  critically ill    GANDHI: [x ] YES [ ] NO    DATE INSERTED:  REMOVE:  [ ] YES [x ] NO  EXPLAIN:  Critically ill    A-LINE:  [ ] YES [x ] NO  LOCATION:   DATE INSERTED:  REMOVE:  [ ] YES [ ] NO  EXPLAIN:    PAST MEDICAL & SURGICAL HISTORY:  No pertinent past medical history  No significant past surgical history        ABG - ( 11 May 2020 04:39 )  pH, Arterial: 7.35  pH, Blood: x     /  pCO2: 60    /  pO2: 52    / HCO3: 32    / Base Excess: 6.1   /  SaO2: 83                    05-11 @ 07:01  -  05-12 @ 07:00  --------------------------------------------------------  IN: 1327 mL / OUT: 890 mL / NET: 437 mL        Mode: AC/ CMV (Assist Control/ Continuous Mandatory Ventilation)  RR (machine): 20  TV (machine): 450  FiO2: 100  PEEP: 15  MAP: 18  PIP: 40      PHYSICAL EXAM:    GENERAL: Sedated  HEAD:  Atraumatic, Normocephalic  EYES: EOMI, PERRLA, conjunctiva and sclera clear  ENMT: No tonsillar erythema, exudates.  Orally intubated. Oral gastric tube  NECK: Supple, No JVD  NERVOUS SYSTEM:  Sedated. No spontaneous movement of extremities.  CHEST/LUNG: Diminished breath sounds bilaterally. Right side chest tube. Output 50ml  HEART: Regular rate and rhythm; No murmurs, rubs, or gallops  ABDOMEN: Hypoactive bowel sounds. Abdomen firm to touch.  EXTREMITIES: Cool to touch. +Pulses. +2 edema bilateral upper extremities.  LYMPH: No lymphadenopathy noted  SKIN: No rashes or lesions      LABS:  CBC Full  -  ( 11 May 2020 05:55 )  WBC Count : 19.52 K/uL  RBC Count : 2.76 M/uL  Hemoglobin : 8.0 g/dL  Hematocrit : 26.8 %  Platelet Count - Automated : 812 K/uL  Mean Cell Volume : 97.1 fl  Mean Cell Hemoglobin : 29.0 pg  Mean Cell Hemoglobin Concentration : 29.9 gm/dL  Auto Neutrophil # : 15.91 K/uL  Auto Lymphocyte # : 1.23 K/uL  Auto Monocyte # : 0.90 K/uL  Auto Eosinophil # : 0.21 K/uL  Auto Basophil # : 0.05 K/uL  Auto Neutrophil % : 81.4 %  Auto Lymphocyte % : 6.3 %  Auto Monocyte % : 4.6 %  Auto Eosinophil % : 1.1 %  Auto Basophil % : 0.3 %    05-11    138  |  96  |  23<H>  ----------------------------<  81  3.3<L>   |  32<H>  |  1.04    Ca    8.2<L>      11 May 2020 05:55  Phos  5.3     05-11  Mg     2.4     05-11    TPro  6.4  /  Alb  1.5<L>  /  TBili  1.1  /  DBili  x   /  AST  30  /  ALT  19  /  AlkPhos  162<H>  05-11              [  ]  DVT Prophylaxis  [  ]  Nutrition, Brand, Rate         Goal Rate         Abdominal Nutritional Status -  Malnutrition   Cachexia      Morbid Obesity BMI >/=40    RADIOLOGY & ADDITIONAL STUDIES:  ***  < from: Xray Chest 1 View-PORTABLE IMMEDIATE (05.12.20 @ 10:07) >  Progression of right pneumothorax to at least 60% of the right chest. Endotracheal tube, nasogastric tube, right jugular line and right pigtail catheter remain present.    Pulmonary infiltrates are similar with allowance for collapse of the right lung.    Impression:  1.  Progression of right pneumothorax as noted.    < end of copied text >  < from: Xray Chest 1 View- PORTABLE-Routine (05.11.20 @ 10:35) >  Endotracheal tube nasogastric tube right internal jugular line right chest catheter reidentified in position. Left costophrenic angle excluded. No significant change in bilateral airspace disease and right pneumothorax. No new abnormality.      < end of copied text >    [  ] Goals of Care Discussion with Family/Proxy/Other           Elements of Conversation Discussed: Patient/Family understanding of current illness   Advanced Directives                                                                       Prognosis  Treatment Options  Care Aligned with patient's wishes                                             TIME SPENT: 35 minutes DNR [x ]   DNI  [  ]    INTERVAL HPI/OVERNIGHT EVENTS: ***transferred from ICU last night. Orally intubated. ETT cuff leaking.    PRESSORS: [x ] YES [ ] NO  WHICH: Levophed    ANTIBIOTICS:                  DATE STARTED:  ANTIBIOTICS:                  DATE STARTED:  ANTIBIOTICS:                  DATE STARTED:    vancomycin  IVPB 1000 milliGRAM(s) IV Intermittent every 12 hours    Cardiovascular:  Heart Failure  Acute   Acute on Chronic  Chronic       norepinephrine Infusion 0.05 MICROgram(s)/kG/Min IV Continuous <Continuous>    Hematalogic:  enoxaparin Injectable 70 milliGRAM(s) SubCutaneous every 12 hours    Other:  acetaminophen    Suspension .. 650 milliGRAM(s) Oral every 6 hours PRN  chlorhexidine 0.12% Liquid 15 milliLiter(s) Oral Mucosa every 12 hours  chlorhexidine 2% Cloths 1 Application(s) Topical <User Schedule>  fentaNYL   Infusion. 0.5 MICROgram(s)/kG/Hr IV Continuous <Continuous>  lactulose Syrup 10 Gram(s) Oral every 12 hours  pantoprazole  Injectable 40 milliGRAM(s) IV Push daily  propofol Infusion 10 MICROgram(s)/kG/Min IV Continuous <Continuous>  senna 1 Tablet(s) Oral two times a day  sodium chloride 0.9% lock flush 10 milliLiter(s) IV Push every 1 hour PRN    acetaminophen    Suspension .. 650 milliGRAM(s) Oral every 6 hours PRN  chlorhexidine 0.12% Liquid 15 milliLiter(s) Oral Mucosa every 12 hours  chlorhexidine 2% Cloths 1 Application(s) Topical <User Schedule>  enoxaparin Injectable 70 milliGRAM(s) SubCutaneous every 12 hours  fentaNYL   Infusion. 0.5 MICROgram(s)/kG/Hr IV Continuous <Continuous>  lactulose Syrup 10 Gram(s) Oral every 12 hours  norepinephrine Infusion 0.05 MICROgram(s)/kG/Min IV Continuous <Continuous>  pantoprazole  Injectable 40 milliGRAM(s) IV Push daily  propofol Infusion 10 MICROgram(s)/kG/Min IV Continuous <Continuous>  senna 1 Tablet(s) Oral two times a day  sodium chloride 0.9% lock flush 10 milliLiter(s) IV Push every 1 hour PRN  vancomycin  IVPB 1000 milliGRAM(s) IV Intermittent every 12 hours    Drug Dosing Weight  Height (cm): 157.48 (15 Apr 2020 13:15)  Weight (kg): 70.8 (15 Apr 2020 13:15)  BMI (kg/m2): 28.5 (15 Apr 2020 13:15)  BSA (m2): 1.72 (15 Apr 2020 13:15)    CENTRAL LINE: [x ] YES [ ] NO  LOCATION: [place]   DATE INSERTED:  4/20  REMOVE: [ ] YES [x ] NO  EXPLAIN:  critically ill    GANDHI: [x ] YES [ ] NO    DATE INSERTED:  REMOVE:  [ ] YES [x ] NO  EXPLAIN:  Critically ill    A-LINE:  [ ] YES [x ] NO  LOCATION:   DATE INSERTED:  REMOVE:  [ ] YES [ ] NO  EXPLAIN:    PAST MEDICAL & SURGICAL HISTORY:  No pertinent past medical history  No significant past surgical history        ABG - ( 11 May 2020 04:39 )  pH, Arterial: 7.35  pH, Blood: x     /  pCO2: 60    /  pO2: 52    / HCO3: 32    / Base Excess: 6.1   /  SaO2: 83                    05-11 @ 07:01  -  05-12 @ 07:00  --------------------------------------------------------  IN: 1327 mL / OUT: 890 mL / NET: 437 mL        Mode: AC/ CMV (Assist Control/ Continuous Mandatory Ventilation)  RR (machine): 20  TV (machine): 450  FiO2: 100  PEEP: 15  MAP: 18  PIP: 40      PHYSICAL EXAM:    GENERAL: Sedated  HEAD:  Atraumatic, Normocephalic  EYES: EOMI, PERRLA, conjunctiva and sclera clear  ENMT: No tonsillar erythema, exudates.  Orally intubated. Oral gastric tube  NECK: Supple, No JVD  NERVOUS SYSTEM:  Sedated. No spontaneous movement of extremities.  CHEST/LUNG: Diminished breath sounds bilaterally. Right side chest tube. Output 50ml  HEART: Regular rate and rhythm; No murmurs, rubs, or gallops  ABDOMEN: Hypoactive bowel sounds. Abdomen firm to touch.  EXTREMITIES: Cool to touch. +Pulses. +2 edema bilateral upper extremities.  LYMPH: No lymphadenopathy noted  SKIN: No rashes or lesions      LABS:  CBC Full  -  ( 11 May 2020 05:55 )  WBC Count : 19.52 K/uL  RBC Count : 2.76 M/uL  Hemoglobin : 8.0 g/dL  Hematocrit : 26.8 %  Platelet Count - Automated : 812 K/uL  Mean Cell Volume : 97.1 fl  Mean Cell Hemoglobin : 29.0 pg  Mean Cell Hemoglobin Concentration : 29.9 gm/dL  Auto Neutrophil # : 15.91 K/uL  Auto Lymphocyte # : 1.23 K/uL  Auto Monocyte # : 0.90 K/uL  Auto Eosinophil # : 0.21 K/uL  Auto Basophil # : 0.05 K/uL  Auto Neutrophil % : 81.4 %  Auto Lymphocyte % : 6.3 %  Auto Monocyte % : 4.6 %  Auto Eosinophil % : 1.1 %  Auto Basophil % : 0.3 %    05-11    138  |  96  |  23<H>  ----------------------------<  81  3.3<L>   |  32<H>  |  1.04    Ca    8.2<L>      11 May 2020 05:55  Phos  5.3     05-11  Mg     2.4     05-11    TPro  6.4  /  Alb  1.5<L>  /  TBili  1.1  /  DBili  x   /  AST  30  /  ALT  19  /  AlkPhos  162<H>  05-11              [  ]  DVT Prophylaxis  [  ]  Nutrition, Brand, Rate         Goal Rate         Abdominal Nutritional Status -  Malnutrition   Cachexia      Morbid Obesity BMI >/=40    RADIOLOGY & ADDITIONAL STUDIES:  ***  < from: Xray Chest 1 View-PORTABLE IMMEDIATE (05.12.20 @ 10:07) >  Progression of right pneumothorax to at least 60% of the right chest. Endotracheal tube, nasogastric tube, right jugular line and right pigtail catheter remain present.    Pulmonary infiltrates are similar with allowance for collapse of the right lung.    Impression:  1.  Progression of right pneumothorax as noted.    < end of copied text >  < from: Xray Chest 1 View- PORTABLE-Routine (05.11.20 @ 10:35) >  Endotracheal tube nasogastric tube right internal jugular line right chest catheter reidentified in position. Left costophrenic angle excluded. No significant change in bilateral airspace disease and right pneumothorax. No new abnormality.      < end of copied text >    [  ] Goals of Care Discussion with Family/Proxy/Other           Elements of Conversation Discussed: Patient/Family understanding of current illness   Advanced Directives                                                                       Prognosis  Treatment Options  Care Aligned with patient's wishes                                             TIME SPENT: 35 minutes

## 2020-05-13 NOTE — PROGRESS NOTE ADULT - ASSESSMENT
49 year old male. +COVID admitted for respiratory failure secondary to COVID. Intubated 4/20. Right IJ inserted 4/20. S/P Plaquenil, solumedrol and Tocilizmab. Hospital course complicated by E. coli bacteremia , completed course of  IV Vanco and Flagyl; right pneumothorax for which right chest placed, to pleuravac and suction. Pt unable to wean off vent 49 year old male. +COVID admitted for respiratory failure secondary to COVID. Intubated 4/20. Right IJ inserted 4/20. S/P Plaquenil, solumedrol and Tocilizmab. Hospital course complicated by multiorgan failure ,  E. coli bacteremia , completed course of  IV Vanco and Flagyl; right pneumothorax for which right chest placed, to pleuravac and suction. Pt unable to wean off vent . Pt DNR, comfort care, on Dilaudid drip.   Pt nonresponsive, no spontaneous breaths, no heart tones auscultated, non-palpable pulses. Pupils fixed and dilated.   Pronounced at 1050am . Pt's brother Gucci notified. Attending Dr. Tsai notified.

## 2020-05-13 NOTE — DISCHARGE NOTE FOR THE EXPIRED PATIENT - HOSPITAL COURSE
49 year old male. +COVID admitted for respiratory failure secondary to COVID. Intubated 4/20. Right IJ inserted 4/20. S/P Plaquenil, solumedrol and Tocilizmab. Hospital course complicated by multiorgan failure ,  E. coli bacteremia , completed course of  IV Vanco and Flagyl; right pneumothorax for which right chest placed, to pleuravac and suction. Pt unable to wean off vent . Pt DNR, comfort care, on Dilaudid drip.   Pt nonresponsive, no spontaneous breaths, no heart tones auscultated, non-palpable pulses. Pupils fixed and dilated.   Pronounced at 1050am . Pt's brother Gucci notified. Attending Dr. Tsai notified. 49 year old male. +COVID admitted for respiratory failure secondary to COVID. Intubated 4/20. Right IJ inserted 4/20. S/P Plaquenil, solumedrol and Tocilizmab. Hospital course complicated by multiorgan failure ,  E. coli bacteremia , completed course of  IV Vanco and Flagyl; right pneumothorax for which right chest placed, to pleuravac and suction. Pt unable to wean off vent . Pt DNR, comfort care, on Dilaudid drip.   Pt nonresponsive, no spontaneous breaths, no heart tones auscultated, non-palpable pulses. Pupils fixed and dilated.   Pronounced at 1050am . Pt's brother Gucci and Surrogate Jonathon Gaspar  notified. Attending Dr. Tsai notified. 49 year old male. +COVID admitted for respiratory failure secondary to COVID. Intubated 4/20. Right IJ inserted 4/20. S/P Plaquenil, solumedrol and Tocilizmab. Hospital course complicated by multiorgan failure ,  E. coli bacteremia , completed course of  IV Vanco and Flagyl; right pneumothorax for which right chest placed, to pleuravac and suction. Pt unable to wean off vent and continued clinical decline despite all aggressive measures. Family decided on DNR, comfort care, on Dilaudid drip.   Pt nonresponsive, no spontaneous breaths, no heart tones auscultated, non-palpable pulses. Pupils fixed and dilated.   Pronounced at 1050am . Pt's brother Gucci and Surrogate Jonathon Gaspar  notified. Attending Dr. Tsai notified.

## 2020-05-13 NOTE — PROGRESS NOTE ADULT - SUBJECTIVE AND OBJECTIVE BOX
INTERVAL /OVERNIGHT EVENTS: Pt nonresponsive, orally intubated to vent, Comfort care , on Dilaudid drip     PRESSORS: [ ] YES [x ] NO  WHICH:    ANTIBIOTICS:                  DATE STARTED:  ANTIBIOTICS:                  DATE STARTED:  ANTIBIOTICS:                  DATE STARTED:    Antimicrobial:    Cardiovascular:    Pulmonary:    Hematalogic:    Other:  acetaminophen    Suspension .. 650 milliGRAM(s) Oral every 6 hours PRN  chlorhexidine 0.12% Liquid 15 milliLiter(s) Oral Mucosa every 12 hours  chlorhexidine 2% Cloths 1 Application(s) Topical <User Schedule>  HYDROmorphone Infusion 4 mG/Hr IV Continuous <Continuous>  sodium chloride 0.9% lock flush 10 milliLiter(s) IV Push every 1 hour PRN    acetaminophen    Suspension .. 650 milliGRAM(s) Oral every 6 hours PRN  chlorhexidine 0.12% Liquid 15 milliLiter(s) Oral Mucosa every 12 hours  chlorhexidine 2% Cloths 1 Application(s) Topical <User Schedule>  HYDROmorphone Infusion 4 mG/Hr IV Continuous <Continuous>  sodium chloride 0.9% lock flush 10 milliLiter(s) IV Push every 1 hour PRN    Drug Dosing Weight  Height (cm): 157.48 (15 Apr 2020 13:15)  Weight (kg): 70.8 (15 Apr 2020 13:15)  BMI (kg/m2): 28.5 (15 Apr 2020 13:15)  BSA (m2): 1.72 (15 Apr 2020 13:15)    CENTRAL LINE: [ ] YES [ x] NO  LOCATION:   DATE INSERTED:  REMOVE: [ ] YES [ ] NO  EXPLAIN:    DIGGS: [x ] YES [ ] NO    DATE INSERTED:  REMOVE:  [ ] YES [ ] NO  EXPLAIN:    A-LINE:  [ x] YES [ ] NO  LOCATION:   DATE INSERTED:  REMOVE:  [ x] YES [ ] NO  EXPLAIN: non-functioning    PMH -reviewed admission note, no change since admission  Heart faliure: acute [ ] chronic [ ] acute or chronic [ ] diastolic [ ] systolic [ ] combied systolic and diastolic[ ]  DAVIS: ATN[ ] renal medullary necrosis [ ] CKD I [ ]CKDII [ ]CKD III [ ]CKD IV [ ]CKD V [ ]Other pathological lesions [ ]  Abdominal Nutrition Status: malnutrition [ ] cachexia [ ] morbid obesity/BMI=40 [ ] Supplement ordered [___________]     ICU Vital Signs Last 24 Hrs  T(C): 37.3 (13 May 2020 04:58), Max: 37.3 (13 May 2020 04:58)  T(F): 99.1 (13 May 2020 04:58), Max: 99.1 (13 May 2020 04:58)  HR: 128 (13 May 2020 08:49) (118 - 133)  BP: 104/53 (13 May 2020 04:58) (101/51 - 116/55)  BP(mean): --  ABP: --  ABP(mean): --  RR: 18 (13 May 2020 04:58) (16 - 24)  SpO2: 91% (13 May 2020 08:49) (86% - 93%)            05-12 @ 07:01  -  05-13 @ 07:00  --------------------------------------------------------  IN: 0 mL / OUT: 540 mL / NET: -540 mL        Mode: AC/ CMV (Assist Control/ Continuous Mandatory Ventilation)  RR (machine): 20  TV (machine): 450  FiO2: 100  PEEP: 10  ITime: 1  MAP: 17  PIP: 40      PHYSICAL EXAM:    GENERAL:   HEAD: atraumatic, normocephalic   EYES: PERRLA, white sclera.   ENMT: nasal mucosa- Oral cavity-   NECK: supple, JVD/ no JVD, thyroid-   LYMPH: no palpable lymph nodes     SKIN: warm, dry   CHEST/LUNG: ET tube: size        cm at lip. No Chest deformity , no chest tenderness. bilateral breath sounds,no  adventitious sounds  HEART: RRR, no m/r/g   ABDOMEN: soft, nontender, nondistended; bowel sounds.  :diggs catheter.  EXTREMITIES: +1 non-pitting edema, no cyanosis, no clubbing.  NEURO: AA0X , mood/ affect-, no focal neuro deficits        LABS:                  RADIOLOGY & ADDITIONAL STUDIES REVIEWED:     [ ]GOALS OF CARE DISCUSSION WITH PATIENT/FAMILY/PROXY:    CRITICAL CARE TIME SPENT: 35 minutes INTERVAL /OVERNIGHT EVENTS: Pt nonresponsive, orally intubated to vent, Comfort care , on Dilaudid drip     PRESSORS: [ ] YES [x ] NO  WHICH:    ANTIBIOTICS:                  DATE STARTED:  ANTIBIOTICS:                  DATE STARTED:  ANTIBIOTICS:                  DATE STARTED:    Antimicrobial:    Cardiovascular:    Pulmonary:    Hematalogic:    Other:  acetaminophen    Suspension .. 650 milliGRAM(s) Oral every 6 hours PRN  chlorhexidine 0.12% Liquid 15 milliLiter(s) Oral Mucosa every 12 hours  chlorhexidine 2% Cloths 1 Application(s) Topical <User Schedule>  HYDROmorphone Infusion 4 mG/Hr IV Continuous <Continuous>  sodium chloride 0.9% lock flush 10 milliLiter(s) IV Push every 1 hour PRN    acetaminophen    Suspension .. 650 milliGRAM(s) Oral every 6 hours PRN  chlorhexidine 0.12% Liquid 15 milliLiter(s) Oral Mucosa every 12 hours  chlorhexidine 2% Cloths 1 Application(s) Topical <User Schedule>  HYDROmorphone Infusion 4 mG/Hr IV Continuous <Continuous>  sodium chloride 0.9% lock flush 10 milliLiter(s) IV Push every 1 hour PRN    Drug Dosing Weight  Height (cm): 157.48 (15 Apr 2020 13:15)  Weight (kg): 70.8 (15 Apr 2020 13:15)  BMI (kg/m2): 28.5 (15 Apr 2020 13:15)  BSA (m2): 1.72 (15 Apr 2020 13:15)    CENTRAL LINE: [x ] YES  LOCATION:   DATE INSERTED:4/20  REMOVE: [ ] YES [ x] NO  EXPLAIN: Dilaudid drip , comfort care    DIGGS: [x ] YES [ ] NO    DATE INSERTED:  REMOVE:  [ ] YES [ ] NO  EXPLAIN:    A-LINE:  [ x] YES [ ] NO  LOCATION:   DATE INSERTED:  REMOVE:  [ x] YES [ ] NO  EXPLAIN: non-functioning    PMH -reviewed admission note, no change since admission  Heart faliure: acute [ ] chronic [ ] acute or chronic [ ] diastolic [ ] systolic [ ] combied systolic and diastolic[ ]  DAVIS: ATN[ ] renal medullary necrosis [ ] CKD I [ ]CKDII [ ]CKD III [ ]CKD IV [ ]CKD V [ ]Other pathological lesions [ ]  Abdominal Nutrition Status: malnutrition [ ] cachexia [ ] morbid obesity/BMI=40 [ ] Supplement ordered [___________]     Vital Signs Last 24 Hrs  T(C): 37.3 (13 May 2020 04:58), Max: 37.3 (13 May 2020 04:58)  T(F): 99.1 (13 May 2020 04:58), Max: 99.1 (13 May 2020 04:58)  HR: 128 (13 May 2020 08:49) (118 - 133)  BP: 104/53 (13 May 2020 04:58) (101/51 - 116/55)  BP(mean): --  ABP: --  ABP(mean): --  RR: 18 (13 May 2020 04:58) (16 - 24)  SpO2: 91% (13 May 2020 08:49) (86% - 93%)            05-12 @ 07:01  -  05-13 @ 07:00  --------------------------------------------------------  IN: 0 mL / OUT: 540 mL / NET: -540 mL        Mode: AC/ CMV (Assist Control/ Continuous Mandatory Ventilation)  RR (machine): 20  TV (machine): 450  FiO2: 100  PEEP: 10  ITime: 1  MAP: 17  PIP: 40      PHYSICAL EXAM:    GENERAL:   HEAD: atraumatic, normocephalic   EYES: PERRLA, white sclera.   ENMT: nasal mucosa- Oral cavity-   NECK: supple, JVD/ no JVD, thyroid-   LYMPH: no palpable lymph nodes     SKIN: warm, dry   CHEST/LUNG: ET tube: size        cm at lip. No Chest deformity , no chest tenderness. bilateral breath sounds,no  adventitious sounds  HEART: RRR, no m/r/g   ABDOMEN: soft, nontender, nondistended; bowel sounds.  :diggs catheter.  EXTREMITIES: +1 non-pitting edema, no cyanosis, no clubbing.  NEURO: AA0X , mood/ affect-, no focal neuro deficits        LABS:                  RADIOLOGY & ADDITIONAL STUDIES REVIEWED:     [ ]GOALS OF CARE DISCUSSION WITH PATIENT/FAMILY/PROXY:    CRITICAL CARE TIME SPENT: 35 minutes INTERVAL /OVERNIGHT EVENTS: Pt nonresponsive, orally intubated to vent, Comfort care , on Dilaudid drip     PRESSORS: [ ] YES [x ] NO  WHICH:    ANTIBIOTICS:                  DATE STARTED:  ANTIBIOTICS:                  DATE STARTED:  ANTIBIOTICS:                  DATE STARTED:    Antimicrobial:    Cardiovascular:    Pulmonary:    Hematalogic:    Other:  acetaminophen    Suspension .. 650 milliGRAM(s) Oral every 6 hours PRN  chlorhexidine 0.12% Liquid 15 milliLiter(s) Oral Mucosa every 12 hours  chlorhexidine 2% Cloths 1 Application(s) Topical <User Schedule>  HYDROmorphone Infusion 4 mG/Hr IV Continuous <Continuous>  sodium chloride 0.9% lock flush 10 milliLiter(s) IV Push every 1 hour PRN    acetaminophen    Suspension .. 650 milliGRAM(s) Oral every 6 hours PRN  chlorhexidine 0.12% Liquid 15 milliLiter(s) Oral Mucosa every 12 hours  chlorhexidine 2% Cloths 1 Application(s) Topical <User Schedule>  HYDROmorphone Infusion 4 mG/Hr IV Continuous <Continuous>  sodium chloride 0.9% lock flush 10 milliLiter(s) IV Push every 1 hour PRN    Drug Dosing Weight  Height (cm): 157.48 (15 Apr 2020 13:15)  Weight (kg): 70.8 (15 Apr 2020 13:15)  BMI (kg/m2): 28.5 (15 Apr 2020 13:15)  BSA (m2): 1.72 (15 Apr 2020 13:15)    CENTRAL LINE: [x ] YES  LOCATION:   DATE INSERTED:4/20  REMOVE: [ ] YES [ x] NO  EXPLAIN: Dilaudid drip , comfort care    DIGGS: [x ] YES [ ] NO    DATE INSERTED:  REMOVE:  [ ] YES [ ] NO  EXPLAIN:  comfort care    A-LINE:  [ x] YES [ ] NO  LOCATION:   DATE INSERTED:  REMOVE:  [ x] YES [ ] NO  EXPLAIN: non-functioning    PMH -reviewed admission note, no change since admission      Vital Signs Last 24 Hrs  T(C): 37.3 (13 May 2020 04:58), Max: 37.3 (13 May 2020 04:58)  T(F): 99.1 (13 May 2020 04:58), Max: 99.1 (13 May 2020 04:58)  HR: 128 (13 May 2020 08:49) (118 - 133)  BP: 104/53 (13 May 2020 04:58) (101/51 - 116/55)  BP(mean): --  ABP: --  ABP(mean): --  RR: 18 (13 May 2020 04:58) (16 - 24)  SpO2: 91% (13 May 2020 08:49) (86% - 93%)            05-12 @ 07:01  -  05-13 @ 07:00  --------------------------------------------------------  IN: 0 mL / OUT: 540 mL / NET: -540 mL        Mode: AC/ CMV (Assist Control/ Continuous Mandatory Ventilation)  RR (machine): 20  TV (machine): 450  FiO2: 100  PEEP: 10  ITime: 1  MAP: 17  PIP: 40      PHYSICAL EXAM:    GENERAL: nonresponsive  HEAD: Pupils nonreactive, atraumatic, normocephalic   ENMT: ETT at 25cm, secured, Oral cavity dry , pale oral mucosa  NECK: supple, JVD/ no JVD,   SKIN: warm, dry   CHEST/LUNG: ET tube:   25cm at lip. No Chest deformity , no chest tenderness. Decreased right, Chest tube to pleuravac , to -20cm .  , no subcutaneous emphysema. Clear anterior left upper .  HEART: distant.   ABDOMEN: soft, nontender, nondistended; bowel sounds.  :diggs catheter.  EXTREMITIES: +1 non-pitting edema, no cyanosis, no clubbing.  NEURO: AA0X , mood/ affect-, no focal neuro deficits        LABS:                  RADIOLOGY & ADDITIONAL STUDIES REVIEWED:     [ ]GOALS OF CARE DISCUSSION WITH PATIENT/FAMILY/PROXY:    CRITICAL CARE TIME SPENT: 35 minutes INTERVAL /OVERNIGHT EVENTS: Pt nonresponsive, orally intubated to vent, Comfort care , on Dilaudid drip     PRESSORS: [ ] YES [x ] NO  WHICH:    ANTIBIOTICS:                  DATE STARTED:  ANTIBIOTICS:                  DATE STARTED:  ANTIBIOTICS:                  DATE STARTED:    Antimicrobial:    Cardiovascular:    Pulmonary:    Hematalogic:    Other:  acetaminophen    Suspension .. 650 milliGRAM(s) Oral every 6 hours PRN  chlorhexidine 0.12% Liquid 15 milliLiter(s) Oral Mucosa every 12 hours  chlorhexidine 2% Cloths 1 Application(s) Topical <User Schedule>  HYDROmorphone Infusion 4 mG/Hr IV Continuous <Continuous>  sodium chloride 0.9% lock flush 10 milliLiter(s) IV Push every 1 hour PRN    acetaminophen    Suspension .. 650 milliGRAM(s) Oral every 6 hours PRN  chlorhexidine 0.12% Liquid 15 milliLiter(s) Oral Mucosa every 12 hours  chlorhexidine 2% Cloths 1 Application(s) Topical <User Schedule>  HYDROmorphone Infusion 4 mG/Hr IV Continuous <Continuous>  sodium chloride 0.9% lock flush 10 milliLiter(s) IV Push every 1 hour PRN    Drug Dosing Weight  Height (cm): 157.48 (15 Apr 2020 13:15)  Weight (kg): 70.8 (15 Apr 2020 13:15)  BMI (kg/m2): 28.5 (15 Apr 2020 13:15)  BSA (m2): 1.72 (15 Apr 2020 13:15)    CENTRAL LINE: [x ] YES  LOCATION:   DATE INSERTED:4/20  REMOVE: [ ] YES [ x] NO  EXPLAIN: Dilaudid drip , comfort care    DIGGS: [x ] YES [ ] NO    DATE INSERTED:  REMOVE:  [ ] YES [ ] NO  EXPLAIN:  comfort care    A-LINE:  [ x] YES [ ] NO  LOCATION:   DATE INSERTED:  REMOVE:  [ x] YES [ ] NO  EXPLAIN: non-functioning    PMH -reviewed admission note, no change since admission      Vital Signs Last 24 Hrs  T(C): 37.3 (13 May 2020 04:58), Max: 37.3 (13 May 2020 04:58)  T(F): 99.1 (13 May 2020 04:58), Max: 99.1 (13 May 2020 04:58)  HR: 128 (13 May 2020 08:49) (118 - 133)  BP: 104/53 (13 May 2020 04:58) (101/51 - 116/55)  BP(mean): --  ABP: --  ABP(mean): --  RR: 18 (13 May 2020 04:58) (16 - 24)  SpO2: 91% (13 May 2020 08:49) (86% - 93%)            05-12 @ 07:01  -  05-13 @ 07:00  --------------------------------------------------------  IN: 0 mL / OUT: 540 mL / NET: -540 mL        Mode: AC/ CMV (Assist Control/ Continuous Mandatory Ventilation)  RR (machine): 20  TV (machine): 450  FiO2: 100  PEEP: 10  ITime: 1  MAP: 17  PIP: 40      PHYSICAL EXAM:    GENERAL: nonresponsive  HEAD: Pupils nonreactive, atraumatic, normocephalic   ENMT: ETT at 25cm, secured, Oral cavity dry , pale oral mucosa  NECK: supple, JVD/ no JVD,   SKIN: warm, dry   CHEST/LUNG: ET tube:   25cm at lip. No Chest deformity , no chest tenderness. Decreased right, Chest tube to pleuravac , to -20cm .  , no subcutaneous emphysema. Clear anterior left upper .  HEART: absent  ABDOMEN: soft, nontender, nondistended;faint  bowel sounds.  :diggs catheter.  EXTREMITIES: +1 non-pitting edema, no cyanosis, no clubbing.  NEURO:nonresponsive        LABS:                  RADIOLOGY & ADDITIONAL STUDIES REVIEWED:     [ ]GOALS OF CARE DISCUSSION WITH PATIENT/FAMILY/PROXY:    CRITICAL CARE TIME SPENT: 35 minutes

## 2020-05-13 NOTE — PROGRESS NOTE ADULT - PROBLEM SELECTOR PROBLEM 1
Acute respiratory failure with hypoxia
SARS-associated coronavirus infection
Acute respiratory failure with hypoxia and hypercapnia

## 2020-05-13 NOTE — DISCHARGE NOTE FOR THE EXPIRED PATIENT - NS PRELIMINARY CAUSE OF DEATH_RESTRICTED
Multi-organ Dysfunction Syndrome/Respiratory Failure/Cardiopulmonary Arrest Respiratory Failure/Cardiopulmonary Arrest/Multi-organ Dysfunction Syndrome/Other:

## 2020-05-14 LAB
CULTURE RESULTS: SIGNIFICANT CHANGE UP
CULTURE RESULTS: SIGNIFICANT CHANGE UP
SPECIMEN SOURCE: SIGNIFICANT CHANGE UP
SPECIMEN SOURCE: SIGNIFICANT CHANGE UP

## 2020-10-29 PROCEDURE — P9047: CPT

## 2020-10-29 PROCEDURE — 86850 RBC ANTIBODY SCREEN: CPT

## 2020-10-29 PROCEDURE — 85027 COMPLETE CBC AUTOMATED: CPT

## 2020-10-29 PROCEDURE — 71045 X-RAY EXAM CHEST 1 VIEW: CPT

## 2020-10-29 PROCEDURE — 36415 COLL VENOUS BLD VENIPUNCTURE: CPT

## 2020-10-29 PROCEDURE — 94002 VENT MGMT INPAT INIT DAY: CPT

## 2020-10-29 PROCEDURE — 81001 URINALYSIS AUTO W/SCOPE: CPT

## 2020-10-29 PROCEDURE — 87186 SC STD MICRODIL/AGAR DIL: CPT

## 2020-10-29 PROCEDURE — 83036 HEMOGLOBIN GLYCOSYLATED A1C: CPT

## 2020-10-29 PROCEDURE — 85730 THROMBOPLASTIN TIME PARTIAL: CPT

## 2020-10-29 PROCEDURE — 82553 CREATINE MB FRACTION: CPT

## 2020-10-29 PROCEDURE — 99285 EMERGENCY DEPT VISIT HI MDM: CPT | Mod: 25

## 2020-10-29 PROCEDURE — 36430 TRANSFUSION BLD/BLD COMPNT: CPT

## 2020-10-29 PROCEDURE — 84100 ASSAY OF PHOSPHORUS: CPT

## 2020-10-29 PROCEDURE — 80048 BASIC METABOLIC PNL TOTAL CA: CPT

## 2020-10-29 PROCEDURE — 94003 VENT MGMT INPAT SUBQ DAY: CPT

## 2020-10-29 PROCEDURE — 80053 COMPREHEN METABOLIC PANEL: CPT

## 2020-10-29 PROCEDURE — 87641 MR-STAPH DNA AMP PROBE: CPT

## 2020-10-29 PROCEDURE — 82962 GLUCOSE BLOOD TEST: CPT

## 2020-10-29 PROCEDURE — 80076 HEPATIC FUNCTION PANEL: CPT

## 2020-10-29 PROCEDURE — 80061 LIPID PANEL: CPT

## 2020-10-29 PROCEDURE — 86923 COMPATIBILITY TEST ELECTRIC: CPT

## 2020-10-29 PROCEDURE — 82607 VITAMIN B-12: CPT

## 2020-10-29 PROCEDURE — 93005 ELECTROCARDIOGRAM TRACING: CPT

## 2020-10-29 PROCEDURE — 87635 SARS-COV-2 COVID-19 AMP PRB: CPT

## 2020-10-29 PROCEDURE — 85379 FIBRIN DEGRADATION QUANT: CPT

## 2020-10-29 PROCEDURE — 80202 ASSAY OF VANCOMYCIN: CPT

## 2020-10-29 PROCEDURE — 83615 LACTATE (LD) (LDH) ENZYME: CPT

## 2020-10-29 PROCEDURE — 84484 ASSAY OF TROPONIN QUANT: CPT

## 2020-10-29 PROCEDURE — 87040 BLOOD CULTURE FOR BACTERIA: CPT

## 2020-10-29 PROCEDURE — 82728 ASSAY OF FERRITIN: CPT

## 2020-10-29 PROCEDURE — 84145 PROCALCITONIN (PCT): CPT

## 2020-10-29 PROCEDURE — 85384 FIBRINOGEN ACTIVITY: CPT

## 2020-10-29 PROCEDURE — 84478 ASSAY OF TRIGLYCERIDES: CPT

## 2020-10-29 PROCEDURE — 86140 C-REACTIVE PROTEIN: CPT

## 2020-10-29 PROCEDURE — 74018 RADEX ABDOMEN 1 VIEW: CPT

## 2020-10-29 PROCEDURE — 87150 DNA/RNA AMPLIFIED PROBE: CPT

## 2020-10-29 PROCEDURE — 82803 BLOOD GASES ANY COMBINATION: CPT

## 2020-10-29 PROCEDURE — P9040: CPT

## 2020-10-29 PROCEDURE — 86900 BLOOD TYPING SEROLOGIC ABO: CPT

## 2020-10-29 PROCEDURE — 85610 PROTHROMBIN TIME: CPT

## 2020-10-29 PROCEDURE — 83735 ASSAY OF MAGNESIUM: CPT

## 2020-10-29 PROCEDURE — 86901 BLOOD TYPING SEROLOGIC RH(D): CPT

## 2020-10-29 PROCEDURE — 82550 ASSAY OF CK (CPK): CPT

## 2020-10-29 PROCEDURE — 87640 STAPH A DNA AMP PROBE: CPT

## 2020-10-29 PROCEDURE — 84443 ASSAY THYROID STIM HORMONE: CPT

## 2022-04-27 NOTE — PROGRESS NOTE ADULT - SUBJECTIVE AND OBJECTIVE BOX
.Contacted patient today in regards to missed appointment. Rescheduled. Was call completed? If not, reason: Call was completed    Does patient want to continue services? If no, and the patient is a new patient, please close the referral. Also, if no, please document reason and route message to provider. Yes    Anything the provider should be aware of? If yes, please route call.  No    Reason for Missed Appointment: Unknown Chart reviewed.  Patient seen and examined.    CC: Patient is a 49y old  Male who presents with a chief complaint of fever cough (15 Apr 2020 18:47)    HPI: 49 years old Male patient from home, who is known COVID positive from 04/10/2019 w/ no significant PMHx or PSHx presents to the ED with fever and headache. Patient reports he has been sick for the past x2 weeks with SOB. Patient adds he is COVID-19 positive and endorses generalized weakness. Patient came to ED 5 days ago with chest pain and shortness of breath and was tested for COVID 19. Patient was saturating fine and was discharged from ED. Patient denies any other complaints.  Patient denies any chest pain, palpitations, abdominal pain, nausea, vomiting abdominal pain , change in urinary or bowel habits.  Patient is admitted with acute respiratory failure secondary to COVID-19 infection requiring 3L NC saturating 96 percent. Will monitor respiratory status. COVID 19 here today is negative. (15 Apr 2020 18:47)      MEDICATIONS  (STANDING):  enoxaparin Injectable 40 milliGRAM(s) SubCutaneous daily  hydroxychloroquine   Oral   hydroxychloroquine 400 milliGRAM(s) Oral every 24 hours    MEDICATIONS  (PRN):  acetaminophen   Tablet .. 650 milliGRAM(s) Oral every 6 hours PRN Temp greater or equal to 38C (100.4F), Mild Pain (1 - 3)  benzocaine 15 mG/menthol 3.6 mG (Sugar-Free) Lozenge 1 Lozenge Oral every 4 hours PRN Sore Throat  guaiFENesin   Syrup  (Sugar-Free) 100 milliGRAM(s) Oral every 6 hours PRN Cough      VITALS:  Vital Signs Last 24 Hrs  T(C): 37.2 (16 Apr 2020 14:44), Max: 37.6 (15 Apr 2020 22:17)  T(F): 99 (16 Apr 2020 14:44), Max: 99.6 (15 Apr 2020 22:17)  HR: 99 (16 Apr 2020 14:44) (92 - 101)  BP: 112/74 (16 Apr 2020 14:44) (102/64 - 117/75)  BP(mean): --  RR: 20 (16 Apr 2020 14:44) (18 - 22)  SpO2: 96% (16 Apr 2020 10:46) (90% - 100%)    PHYSICAL EXAM:  GENERAL: NAD  Head: AT/NC  eyes: nonicteric  Mouth: dry membranes  RESP: even and unlabored breathing with NRM, desats to 88% on rm air  BREAST:  not examined  NEURO: AAOX3, follows all commands, able to move extremities spontaneously    LABS:                      12.9   11.35 )-----------( 381      ( 16 Apr 2020 07:47 )             38.7     04-16    136  |  100  |  9   ----------------------------<  80  3.5   |  27  |  0.68    Ca    8.2<L>      16 Apr 2020 07:47  Phos  3.2     04-16  Mg     2.4     04-16    TPro  6.9  /  Alb  2.4<L>  /  TBili  1.1  /  DBili  0.5<H>  /  AST  135<H>  /  ALT  203<H>  /  AlkPhos  259<H>  04-16    LIVER FUNCTIONS - ( 16 Apr 2020 07:47 )  Alb: 2.4 g/dL / Pro: 6.9 g/dL / ALK PHOS: 259 U/L / ALT: 203 U/L DA / AST: 135 U/L / GGT: x             COVID-19 PCR: NotDetec (15 Apr 2020 14:25)    COVID-19 PCR: Detected: This test has been validated by [employer] to be accurate;  though it has not been FDA cleared/approved by the usual pathway.  As with all laboratory tests, results should be correlated with clinical  findings.  https://www.fda.gov/media/026668/download  https://www.fda.gov/media/646382/download (04.10.20 @ 21:35)    < from: Xray Chest 1 View-PORTABLE IMMEDIATE (04.15.20 @ 14:12) >  Findings:  Peripheral subpleural groundglass interstitial infiltrate at the left lung base. Peripheral interstitial infiltrate in the right midlung. No evidence of pleural effusion or pneumothorax. The heart is not enlarged. No hilar or mediastinal abnormality. No significant osseous abnormality..    Impression:  Findings are consistent with but not diagnostic of COVID pneumonia..    < end of copied text >
